# Patient Record
Sex: FEMALE | Race: WHITE | Employment: OTHER | URBAN - METROPOLITAN AREA
[De-identification: names, ages, dates, MRNs, and addresses within clinical notes are randomized per-mention and may not be internally consistent; named-entity substitution may affect disease eponyms.]

---

## 2017-05-13 ENCOUNTER — IP HISTORICAL/CONVERTED ENCOUNTER (OUTPATIENT)
Dept: OTHER | Age: 82
End: 2017-05-13

## 2019-03-06 LAB — CREATININE, EXTERNAL: 0.67

## 2019-03-08 ENCOUNTER — HOSPITAL ENCOUNTER (OUTPATIENT)
Dept: LAB | Age: 84
Discharge: HOME | End: 2019-03-08
Payer: MEDICARE

## 2019-03-08 ENCOUNTER — OFFICE VISIT (OUTPATIENT)
Dept: ENDOCRINOLOGY | Age: 84
End: 2019-03-08

## 2019-03-08 VITALS
TEMPERATURE: 96.2 F | WEIGHT: 130 LBS | RESPIRATION RATE: 16 BRPM | DIASTOLIC BLOOD PRESSURE: 49 MMHG | OXYGEN SATURATION: 98 % | SYSTOLIC BLOOD PRESSURE: 136 MMHG | HEIGHT: 61 IN | BODY MASS INDEX: 24.55 KG/M2 | HEART RATE: 66 BPM

## 2019-03-08 DIAGNOSIS — M81.0 AGE-RELATED OSTEOPOROSIS WITHOUT CURRENT PATHOLOGICAL FRACTURE: Primary | ICD-10-CM

## 2019-03-08 DIAGNOSIS — E55.9 VITAMIN D DEFICIENCY: ICD-10-CM

## 2019-03-08 PROCEDURE — 82306 VITAMIN D 25 HYDROXY: CPT

## 2019-03-08 PROCEDURE — 36415 COLL VENOUS BLD VENIPUNCTURE: CPT

## 2019-03-08 RX ORDER — ESOMEPRAZOLE MAGNESIUM 40 MG/1
1 CAPSULE, DELAYED RELEASE ORAL DAILY
COMMUNITY
Start: 2019-02-18 | End: 2020-10-26

## 2019-03-08 RX ORDER — ACETAMINOPHEN 500 MG
500 TABLET ORAL
COMMUNITY
End: 2020-10-26

## 2019-03-08 RX ORDER — LOSARTAN POTASSIUM AND HYDROCHLOROTHIAZIDE 12.5; 5 MG/1; MG/1
1 TABLET ORAL DAILY
COMMUNITY
Start: 2019-02-20 | End: 2020-10-26

## 2019-03-08 RX ORDER — CLOTRIMAZOLE AND BETAMETHASONE DIPROPIONATE 10; .64 MG/G; MG/G
CREAM TOPICAL 2 TIMES DAILY
COMMUNITY
End: 2020-10-26

## 2019-03-08 RX ORDER — DOCUSATE SODIUM 100 MG/1
100 CAPSULE, LIQUID FILLED ORAL AS NEEDED
COMMUNITY
End: 2020-10-26

## 2019-03-08 RX ORDER — ESTRADIOL 0.1 MG/G
CREAM VAGINAL
COMMUNITY
End: 2020-10-26

## 2019-03-08 RX ORDER — METFORMIN HYDROCHLORIDE 500 MG/1
1 TABLET ORAL 2 TIMES DAILY
COMMUNITY
Start: 2019-03-04 | End: 2020-10-26

## 2019-03-08 RX ORDER — PREGABALIN 100 MG/1
1 CAPSULE ORAL 2 TIMES DAILY
COMMUNITY
Start: 2019-02-19 | End: 2020-10-26

## 2019-03-08 RX ORDER — AMLODIPINE BESYLATE 10 MG/1
1 TABLET ORAL DAILY
COMMUNITY
Start: 2019-02-19 | End: 2020-10-26

## 2019-03-08 NOTE — PROGRESS NOTES
Camden Renee is a 80 y.o. female here for   Chief Complaint   Patient presents with    New Patient     referred by Dr. Ahsan Parikh for Osteoporosis       1. Have you been to the ER, urgent care clinic since your last visit? Hospitalized since your last visit? -n/a    2. Have you seen or consulted any other health care providers outside of the [address] [place] [address] since your last visit?   Include any pap smears or colon screening.-n/a

## 2019-03-08 NOTE — PROGRESS NOTES
[facility]               Yessenia Dickens MD        [address] [address] SK:[phone] Fax [phone]           Patient Information  Date:3/9/2019  Name : Tommy Barber eileen SCHRADER O.B : 9/20/1923         Referred by: Jeronimo Siu MD       Chief Complaint   Patient presents with   [facility] New Patient     referred by Dr. Serjio Renner for Osteoporosis       History of Present Illness: Abrahan Jeffrey is a 80 y.o. female here for evaluation and  management of osteoporosis. She was diagnosed with osteoporosis 10 years ago, was on Prolia for 4 years, last Prolia was in June 2018, she was getting it from [facility]. Tolerated Prolia well. She has underlying GERD, no peptic ulcer disease. Left hip fracture after a fall in 2017  She is on calcium and vitamin D supplements  No severe backache    Prior therapy :Prolia 6/2018    Dietary calcium Intake: Moderate amount of dairy in her diet. On calcium and vitamin D supplements. No history of excess alcohol or tobacco abuse. No known history of hypercalcemia,  Kidney stones, family history of kidney stones. Past Medical History:   Diagnosis Date    Hypertension     Osteoporosis     Type 2 diabetes mellitus ([address]      Past Surgical History:   Procedure Laterality Date    HX HIP REPLACEMENT      HX HYSTERECTOMY      HX KNEE ARTHROSCOPY       Current Outpatient Medications   Medication Sig    amLODIPine (NORVASC) 10 mg tablet Take 1 Tab by mouth daily.  esomeprazole (NEXIUM) 40 mg capsule Take 1 Cap by mouth daily.  losartan-hydroCHLOROthiazide (HYZAAR) 50-12.5 mg per tablet Take 1 Tab by mouth daily.  LYRICA 100 mg capsule Take 1 Tab by mouth two (2) times a day.  metFORMIN (GLUCOPHAGE) 500 mg tablet Take 1 Tab by mouth two (2) times a day.     estradiol (ESTRACE) 0.01 % (0.1 mg/gram) vaginal cream Twice a week    clotrimazole-betamethasone (LOTRISONE) topical cream two (2) times a day.    acetaminophen (TYLENOL EXTRA STRENGTH) 500 mg tablet Take 500 mg by mouth every six (6) hours as needed for Pain.  calcium carbonate-vitamin D3 (CALTRATE 600 + D) 600 mg (1,500 mg)-800 unit chew Take 1 Tab by mouth daily.  docusate sodium (COLACE) 100 mg capsule Take 100 mg by mouth as needed for Constipation.  psyllium seed, with dextrose, (FIBER PO) Take 1 Tab by mouth two (2) times a day.  sodium chloride (AYR SALINE) 0.65 % nasal squeeze bottle 1 [place] by Both Nostrils route as needed for Congestion.  medical supply, miscellaneous (OCUSOFT LID SCRUB) by Does Not Apply route daily. No current facility-administered medications for this visit. No Known Allergies      Review of Systems:  All 10 systems  reviewed and are negative other than mentioned in HPI    Physical Examination:  Blood pressure 136/49, pulse 66, temperature 96.2 °F (35.7 °C), temperature source Oral, resp. rate 16, height 5' 0.5\" (1.537 m), weight 130 lb (59 kg), SpO2 98 %. Body mass index is 24.97 kg/m². - General: pleasant, no distress, good eye contact  - HEENT: no exopthalmos, no periorbital edema, no scleral/conjunctival injection, EOMI, no lid lag or stare  - Neck: supple, no thyromegaly, no supraclavicular or dorsocervical fat pads  - Cardiovascular: regular, normal rate, normal S1 and S2,   - Respiratory: clear to auscultation bilaterally  - Gastrointestinal: soft, nontender, nondistended, BS +  - Musculoskeletal: no proximal muscle weakness in upper or lower extremities  - Integumentary:  no rashes, no edema  - Neurological: reflexes 2+ at biceps, no tremor  - Psychiatric: normal mood and affect    Data Reviewed:         Assessment/Plan:     1. Age-related osteoporosis without current pathological fracture    2. Vitamin D deficiency        Osteoporosis: Age-related    She has normal renal parameters, check vitamin D.   She is on calcium and vitamin D, euthyroid    She wishes to resume Prolia, also discussed about IV zoledronic acid infusion. Thank you for allowing me to participate in the care of this patient. Josie Overton MD    Patient Bryanna Rodriguez verbalized understanding      There are no Patient Instructions on file for this visit. Follow-up Disposition:  Return in about 6 months (around 9/8/2019) for prolia and follow up. Voice-recognition software was used to generate this report, which may result in some phonetic-based errors in the grammar and contents. Even though attempts were made to correct all the mistakes, some may have been missed and remained in the body of the report.

## 2019-03-09 LAB — 25(OH)D3+25(OH)D2 SERPL-MCNC: 52.1 NG/ML (ref 30–100)

## 2019-03-13 ENCOUNTER — TELEPHONE (OUTPATIENT)
Dept: ENDOCRINOLOGY | Age: 84
End: 2019-03-13

## 2019-03-13 NOTE — TELEPHONE ENCOUNTER
----- Message from Regla Mcmahon MD sent at 3/9/2019 10:55 PM EST -----  If she can have Prolia, ask her to come in for the injection

## 2019-03-14 NOTE — TELEPHONE ENCOUNTER
Per Dr. Huseyin Dennis, informed pt of result note, as noted above. Pt verbalized understanding with no further questions or concerns at this time. Transferred pt to  to schedule.

## 2019-03-19 PROBLEM — M77.40 METATARSALGIA: Status: ACTIVE | Noted: 2018-10-02

## 2019-03-19 PROBLEM — L03.119 CELLULITIS OF FOOT: Status: ACTIVE | Noted: 2018-03-27

## 2019-03-19 PROBLEM — S32.9XXA FRACTURE OF PELVIS (HCC): Status: ACTIVE | Noted: 2017-06-20

## 2019-03-19 PROBLEM — L85.1 ACQUIRED KERATODERMA: Status: ACTIVE | Noted: 2019-02-18

## 2019-03-19 PROBLEM — E11.9 DIABETES MELLITUS (HCC): Status: ACTIVE | Noted: 2019-02-18

## 2019-03-19 PROBLEM — M81.0 OSTEOPOROSIS: Status: ACTIVE | Noted: 2019-03-19

## 2019-03-20 ENCOUNTER — CLINICAL SUPPORT (OUTPATIENT)
Dept: ENDOCRINOLOGY | Age: 84
End: 2019-03-20

## 2019-03-20 VITALS
BODY MASS INDEX: 24.73 KG/M2 | HEIGHT: 61 IN | SYSTOLIC BLOOD PRESSURE: 148 MMHG | OXYGEN SATURATION: 95 % | TEMPERATURE: 97.8 F | WEIGHT: 131 LBS | HEART RATE: 67 BPM | RESPIRATION RATE: 14 BRPM | DIASTOLIC BLOOD PRESSURE: 43 MMHG

## 2019-03-20 DIAGNOSIS — M81.0 AGE RELATED OSTEOPOROSIS, UNSPECIFIED PATHOLOGICAL FRACTURE PRESENCE: Primary | ICD-10-CM

## 2019-03-20 NOTE — PROGRESS NOTES
Order placed for pt per verbal order with read back from Dr. Gretta Felipe 03/20/19    Prolia inj given in Left/Right arm subcutaneous by nurse ELAINA Nunn LPN. Pt tolerated well.   Lot #: [id number]  Exp Date: 03/21  Tali Webb 47: [id number]  AMGEN

## 2019-09-18 ENCOUNTER — HOSPITAL ENCOUNTER (OUTPATIENT)
Dept: LAB | Age: 84
Discharge: HOME | End: 2019-09-18
Payer: MEDICARE

## 2019-09-18 DIAGNOSIS — M81.0 AGE RELATED OSTEOPOROSIS, UNSPECIFIED PATHOLOGICAL FRACTURE PRESENCE: ICD-10-CM

## 2019-09-18 PROCEDURE — 82306 VITAMIN D 25 HYDROXY: CPT

## 2019-09-18 PROCEDURE — 80048 BASIC METABOLIC PNL TOTAL CA: CPT

## 2019-09-18 PROCEDURE — 36415 COLL VENOUS BLD VENIPUNCTURE: CPT

## 2019-09-19 LAB
25(OH)D3+25(OH)D2 SERPL-MCNC: 55 NG/ML (ref 30–100)
BUN SERPL-MCNC: 23 MG/DL (ref 10–36)
BUN/CREAT SERPL: 31 (ref 12–28)
CALCIUM SERPL-MCNC: 9.8 MG/DL (ref 8.7–10.3)
CHLORIDE SERPL-SCNC: 103 MMOL/L (ref 96–106)
CO2 SERPL-SCNC: 28 MMOL/L (ref 20–29)
CREAT SERPL-MCNC: 0.74 MG/DL (ref 0.57–1)
GLUCOSE SERPL-MCNC: 106 MG/DL (ref 65–99)
POTASSIUM SERPL-SCNC: 4.3 MMOL/L (ref 3.5–5.2)
SODIUM SERPL-SCNC: 145 MMOL/L (ref 134–144)

## 2019-09-25 ENCOUNTER — OFFICE VISIT (OUTPATIENT)
Dept: ENDOCRINOLOGY | Age: 84
End: 2019-09-25

## 2019-09-25 VITALS
DIASTOLIC BLOOD PRESSURE: 52 MMHG | TEMPERATURE: 96.3 F | BODY MASS INDEX: 23.22 KG/M2 | OXYGEN SATURATION: 96 % | HEART RATE: 68 BPM | SYSTOLIC BLOOD PRESSURE: 123 MMHG | RESPIRATION RATE: 14 BRPM | WEIGHT: 123 LBS | HEIGHT: 61 IN

## 2019-09-25 DIAGNOSIS — M81.0 AGE RELATED OSTEOPOROSIS, UNSPECIFIED PATHOLOGICAL FRACTURE PRESENCE: Primary | ICD-10-CM

## 2019-09-25 DIAGNOSIS — E55.9 VITAMIN D DEFICIENCY: ICD-10-CM

## 2019-09-25 RX ORDER — SITAGLIPTIN 25 MG/1
TABLET, FILM COATED ORAL
COMMUNITY
Start: 2019-09-04 | End: 2020-10-26

## 2019-09-25 RX ORDER — ATORVASTATIN CALCIUM 20 MG/1
1 TABLET, FILM COATED ORAL DAILY
COMMUNITY
Start: 2019-08-29 | End: 2020-10-26

## 2019-09-25 NOTE — LETTER
9/25/19 Patient: Newell Sacks YOB: 1923 Date of Visit: 9/25/2019 Heidi Mckinney MD 
[address] [facility] ,[address] Unit [facility] [address] VIA Facsimile: [phone] Dear Heidi Mckinney MD, Thank you for referring Ms. Collette Cleary to [facility] for evaluation. My notes for this consultation are attached. If you have questions, please do not hesitate to call me. I look forward to following your patient along with you. Sincerely, John Barlow MD

## 2019-09-25 NOTE — PROGRESS NOTES
Yosi Ruano is a 80 y.o. female here for   Chief Complaint   Patient presents with    Osteoporosis       1. Have you been to the ER, urgent care clinic since your last visit? Hospitalized since your last visit? -no    2. Have you seen or consulted any other health care providers outside of the [address] [place] [address] since your last visit? Include any pap smears or colon screening. -PCP

## 2019-10-22 ENCOUNTER — TELEPHONE (OUTPATIENT)
Dept: ENDOCRINOLOGY | Age: 84
End: 2019-10-22

## 2019-10-22 DIAGNOSIS — M81.0 AGE RELATED OSTEOPOROSIS, UNSPECIFIED PATHOLOGICAL FRACTURE PRESENCE: Primary | ICD-10-CM

## 2019-10-22 NOTE — TELEPHONE ENCOUNTER
Patient says she has not heard anything from [facility] for Prolia injection, asking if someone could look into it for her and that she really does not want to go there if there is another option

## 2019-10-22 NOTE — TELEPHONE ENCOUNTER
[facility] is where we can get Prolia cheaper for her , if I give it in the office it will be expensive    Please send another Prolia form to infusion center

## 2019-10-22 NOTE — TELEPHONE ENCOUNTER
Informed pt that order will be faxed for Prolia injection and someone will call to schedule. Informed pt that if injection is given in the office it could be very expensive. Pt verbalized understanding.

## 2019-11-06 ENCOUNTER — HOSPITAL ENCOUNTER (OUTPATIENT)
Dept: INFUSION THERAPY | Age: 84
Discharge: HOME | End: 2019-11-06
Payer: MEDICARE

## 2019-11-06 VITALS
HEART RATE: 87 BPM | OXYGEN SATURATION: 97 % | DIASTOLIC BLOOD PRESSURE: 65 MMHG | TEMPERATURE: 97 F | SYSTOLIC BLOOD PRESSURE: 155 MMHG | RESPIRATION RATE: 18 BRPM

## 2019-11-06 LAB
ANION GAP BLD CALC-SCNC: 14 MMOL/L (ref 10–20)
BUN BLD-MCNC: 31 MG/DL (ref 9–20)
CA-I BLD-MCNC: 1.18 MMOL/L (ref 1.12–1.32)
CHLORIDE BLD-SCNC: 99 MMOL/L (ref 98–107)
CO2 BLD-SCNC: 30 MMOL/L (ref 21–32)
CREAT BLD-MCNC: 0.8 MG/DL (ref 0.6–1.3)
GLUCOSE BLD-MCNC: 171 MG/DL (ref 65–100)
HCT VFR BLD CALC: 39 % (ref 35–47)
MAGNESIUM SERPL-MCNC: 1.9 MG/DL (ref 1.6–2.4)
PHOSPHATE SERPL-MCNC: 3.4 MG/DL (ref 2.6–4.7)
POTASSIUM BLD-SCNC: 3.7 MMOL/L (ref 3.5–5.1)
SERVICE CMNT-IMP: ABNORMAL
SODIUM BLD-SCNC: 139 MMOL/L (ref 136–145)

## 2019-11-06 PROCEDURE — 74011250636 HC RX REV CODE- 250/636: Performed by: INTERNAL MEDICINE

## 2019-11-06 PROCEDURE — 83735 ASSAY OF MAGNESIUM: CPT

## 2019-11-06 PROCEDURE — 84100 ASSAY OF PHOSPHORUS: CPT

## 2019-11-06 PROCEDURE — 96372 THER/PROPH/DIAG INJ SC/IM: CPT

## 2019-11-06 PROCEDURE — 36415 COLL VENOUS BLD VENIPUNCTURE: CPT

## 2019-11-06 PROCEDURE — 80047 BASIC METABLC PNL IONIZED CA: CPT

## 2019-11-06 RX ADMIN — DENOSUMAB 60 MG: 60 INJECTION SUBCUTANEOUS at 15:47

## 2019-11-06 NOTE — PROGRESS NOTES
[facility] VISIT NOTE    1525. Pt arrived at [facility] ambulatory and in no distress for Prolia. Assessment completed, pt c/o chronic hip and back pain. Patient denied recent dental work. Labs drawn peripherally. Istat performed as ordered and iCa within parameters to treat. Medications received:  Prolia - SQ - Left Arm    Tolerated treatment well, no adverse reaction noted. Patient Vitals for the past 12 hrs:   Temp Pulse Resp BP SpO2   11/06/19 1527 97 °F (36.1 °C) 87 18 155/65 97 %     Recent Results (from the past 12 hour(s))   POC CHEM8    Collection Time: 11/06/19  3:42 PM   Result Value Ref Range    Calcium, ionized (POC) 1.18 1.12 - 1.32 mmol/L    Sodium (POC) 139 136 - 145 mmol/L    Potassium (POC) 3.7 3.5 - 5.1 mmol/L    Chloride (POC) 99 98 - 107 mmol/L    CO2 (POC) 30 21 - 32 mmol/L    Anion gap (POC) 14 10 - 20 mmol/L    Glucose (POC) 171 (H) 65 - 100 mg/dL    BUN (POC) 31 (H) 9 - 20 mg/dL    Creatinine (POC) 0.8 0.6 - 1.3 mg/dL    GFRAA, POC >60 >60 ml/min/1.73m2    GFRNA, POC >60 >60 ml/min/1.73m2    Hematocrit (POC) 39 35.0 - 47.0 %    Comment Comment Not Indicated. 1550. D/C'd from [facility] ambulatory and in no distress accompanied by caregiver.  Next appointment is 5/7/20 at 3:00 pm.

## 2020-03-31 ENCOUNTER — TELEPHONE (OUTPATIENT)
Dept: ENDOCRINOLOGY | Age: 85
End: 2020-03-31

## 2020-03-31 DIAGNOSIS — E55.9 VITAMIN D DEFICIENCY: ICD-10-CM

## 2020-03-31 DIAGNOSIS — M81.0 AGE RELATED OSTEOPOROSIS, UNSPECIFIED PATHOLOGICAL FRACTURE PRESENCE: Primary | ICD-10-CM

## 2020-04-07 ENCOUNTER — HOSPITAL ENCOUNTER (OUTPATIENT)
Dept: LAB | Age: 85
Discharge: HOME | End: 2020-04-07

## 2020-04-07 DIAGNOSIS — M81.0 AGE RELATED OSTEOPOROSIS, UNSPECIFIED PATHOLOGICAL FRACTURE PRESENCE: ICD-10-CM

## 2020-04-07 DIAGNOSIS — E55.9 VITAMIN D DEFICIENCY: ICD-10-CM

## 2020-04-07 LAB
ANION GAP SERPL CALC-SCNC: 3 MMOL/L (ref 5–15)
BUN SERPL-MCNC: 24 MG/DL (ref 6–20)
BUN/CREAT SERPL: 35 (ref 12–20)
CALCIUM SERPL-MCNC: 9.6 MG/DL (ref 8.5–10.1)
CHLORIDE SERPL-SCNC: 102 MMOL/L (ref 97–108)
CO2 SERPL-SCNC: 35 MMOL/L (ref 21–32)
CREAT SERPL-MCNC: 0.69 MG/DL (ref 0.55–1.02)
GLUCOSE SERPL-MCNC: 169 MG/DL (ref 65–100)
POTASSIUM SERPL-SCNC: 4 MMOL/L (ref 3.5–5.1)
SODIUM SERPL-SCNC: 140 MMOL/L (ref 136–145)

## 2020-04-08 LAB — 25(OH)D3 SERPL-MCNC: 38 NG/ML (ref 30–100)

## 2020-04-15 ENCOUNTER — VIRTUAL VISIT (OUTPATIENT)
Dept: ENDOCRINOLOGY | Age: 85
End: 2020-04-15

## 2020-04-15 DIAGNOSIS — M81.0 AGE RELATED OSTEOPOROSIS, UNSPECIFIED PATHOLOGICAL FRACTURE PRESENCE: Primary | ICD-10-CM

## 2020-04-15 DIAGNOSIS — E55.9 VITAMIN D DEFICIENCY: ICD-10-CM

## 2020-04-15 RX ORDER — VALSARTAN AND HYDROCHLOROTHIAZIDE 160; 12.5 MG/1; MG/1
1 TABLET, FILM COATED ORAL DAILY
COMMUNITY
End: 2020-10-26

## 2020-04-15 NOTE — PROGRESS NOTES
Gilles Rabago is a 80 y.o. female here for   Chief Complaint   Patient presents with    Osteoporosis       1. Have you been to the ER, urgent care clinic since your last visit? Hospitalized since your last visit? -no    2. Have you seen or consulted any other health care providers outside of the [address] [place] [address] since your last visit?   Include any pap smears or colon screening.-no      Order placed for pt per verbal order with read back from Dr. Dunia Ahumada 04/15/20

## 2020-04-15 NOTE — PROGRESS NOTES
Abel Dawn MD        Patient Information  Date:4/15/2020  Name : Aly Cleary 80 y.o.     YOB: 1923         Referred by: Bianca Kwong MD       Chief Complaint   Patient presents with    Osteoporosis   Pursuant to the emergency declaration under the [facility] [place] [address] waiver authority and the [employer] Act, this Virtual  Visit was conducted, with patient's consent, to reduce the patient's risk of exposure to COVID-19 . Patient  is aware that this is a billable encounter and is responsible for copays/deductibles       Services were provided through a telephone discussion virtually to substitute for in-person clinic visit. Place of service: Provider : [facility] diabetes endocrinology office  Patient: Home    History of Present Illness: Damien Orellana is a 80 y.o. female here for follow-up of osteoporosis. She was diagnosed with osteoporosis 10 years ago, was on Prolia since 2014, initially got it from [facility]. She has underlying GERD, no peptic ulcer disease. Left hip fracture after a fall in 2017  She is on calcium and vitamin D supplements  No severe backache  No dental issues, no falls  She had the last infusion at [facility]      Left thigh pain, radiates from the back of the hip, history of arthritis, bursitis, steroid injections in the past.  It is not in the right thigh. No fall          Dietary calcium Intake: Moderate amount of dairy in her diet. On calcium and vitamin D supplements. No history of excess alcohol or tobacco abuse. No known history of hypercalcemia,  Kidney stones, family history of kidney stones.       Past Medical History:   Diagnosis Date    Hypertension     Osteoporosis     Type 2 diabetes mellitus ([facility] [address]      Past Surgical History:   Procedure Laterality Date    HX HIP REPLACEMENT      HX HYSTERECTOMY      HX KNEE ARTHROSCOPY       Current Outpatient Medications   Medication Sig    valsartan-hydroCHLOROthiazide (DIOVAN-HCT) 160-12.5 mg per tablet Take 1 Tab by mouth daily.  JANUVIA 25 mg tablet     atorvastatin (LIPITOR) 20 mg tablet Take 1 Tab by mouth daily.  amLODIPine (NORVASC) 10 mg tablet Take 1 Tab by mouth daily.  esomeprazole (NEXIUM) 40 mg capsule Take 1 Cap by mouth daily.  LYRICA 100 mg capsule Take 1 Tab by mouth two (2) times a day.  metFORMIN (GLUCOPHAGE) 500 mg tablet Take 1 Tab by mouth two (2) times a day.  estradiol (ESTRACE) 0.01 % (0.1 mg/gram) vaginal cream Twice a week    clotrimazole-betamethasone (LOTRISONE) topical cream two (2) times a day.  acetaminophen (TYLENOL EXTRA STRENGTH) 500 mg tablet Take 500 mg by mouth every six (6) hours as needed for Pain.  calcium carbonate-vitamin D3 (CALTRATE 600 + D) 600 mg (1,500 mg)-800 unit chew Take 1 Tab by mouth daily.  docusate sodium (COLACE) 100 mg capsule Take 100 mg by mouth as needed for Constipation.  psyllium seed, with dextrose, (FIBER PO) Take 1 Tab by mouth two (2) times a day.  sodium chloride (AYR SALINE) 0.65 % nasal squeeze bottle 1 [place] by Both Nostrils route as needed for Congestion.  medical supply, miscellaneous (OCUSOFT LID SCRUB) by Does Not Apply route daily.  losartan-hydroCHLOROthiazide (HYZAAR) 50-12.5 mg per tablet Take 1 Tab by mouth daily. No current facility-administered medications for this visit. No Known Allergies      Review of Systems:  All 10 systems  reviewed and are negative other than mentioned in HPI    Physical Examination:  There were no vitals taken for this visit. There is no height or weight on file to calculate BMI.  -     Data Reviewed:         Assessment/Plan:     1. Age related osteoporosis, unspecified pathological fracture presence    2.  Vitamin D deficiency        Osteoporosis: Age-related    She has normal renal parameters    Prolia November 2019, due for Prolia, discussed about postponing and using alendronate weekly, she does not want to take the alendronate. She will try to get the Prolia at the infusion center  Not interested in IV Reclast  Fall precautions      Left thigh pain likely radiating pain from the arthritis: It is unilateral  Warm pack  If no improvement and if it gets worse discussed about getting x-ray, need to rule out atypical femoral fracture    Hypertension    Hyperlipidemia: Could discontinue statin,      Spent 15 to 18 minutes on the phone with the patient      Thank you for allowing me to participate in the care of this patient. Sasha Purdy MD    Patient Eun Waters verbalized understanding      There are no Patient Instructions on file for this visit. Follow-up and Dispositions    · Return in about 3 months (around 7/15/2020). Voice-recognition software was used to generate this report, which may result in some phonetic-based errors in the grammar and contents. Even though attempts were made to correct all the mistakes, some may have been missed and remained in the body of the report.

## 2020-05-06 ENCOUNTER — HOSPITAL ENCOUNTER (OUTPATIENT)
Dept: INFUSION THERAPY | Age: 85
Discharge: HOME | End: 2020-05-06
Payer: MEDICARE

## 2020-05-06 VITALS
TEMPERATURE: 96.2 F | DIASTOLIC BLOOD PRESSURE: 65 MMHG | RESPIRATION RATE: 16 BRPM | SYSTOLIC BLOOD PRESSURE: 142 MMHG | HEART RATE: 73 BPM

## 2020-05-06 LAB
MAGNESIUM SERPL-MCNC: 2 MG/DL (ref 1.6–2.4)
PHOSPHATE SERPL-MCNC: 3.5 MG/DL (ref 2.6–4.7)

## 2020-05-06 PROCEDURE — 84100 ASSAY OF PHOSPHORUS: CPT

## 2020-05-06 PROCEDURE — 74011250636 HC RX REV CODE- 250/636: Performed by: INTERNAL MEDICINE

## 2020-05-06 PROCEDURE — 83735 ASSAY OF MAGNESIUM: CPT

## 2020-05-06 PROCEDURE — 96372 THER/PROPH/DIAG INJ SC/IM: CPT

## 2020-05-06 PROCEDURE — 36415 COLL VENOUS BLD VENIPUNCTURE: CPT

## 2020-05-06 RX ADMIN — DENOSUMAB 60 MG: 60 INJECTION SUBCUTANEOUS at 13:28

## 2020-05-06 NOTE — PROGRESS NOTES
[facility] VISIT NOTE    Pt arrived at [facility] ambulatory and in no distress for Prolia. Assessment completed, pt had no complaints. Patient Vitals for the past 12 hrs:   Temp Pulse Resp BP   05/06/20 1308 96.2 °F (35.7 °C) 73 16 142/65     Labs drawn from left ac and sent. Medications Administered     denosumab (PROLIA) injection 60 mg     Admin Date  05/06/2020 Action  Given Dose  60 mg Route  SubCUTAneous Administered By  Ese Blanton RN              Tolerated treatment well, no adverse reaction noted. D/C'd from [facility] ambulatory and in no distress. Next appointment is 11/6/20.

## 2020-05-07 ENCOUNTER — APPOINTMENT (OUTPATIENT)
Dept: INFUSION THERAPY | Age: 85
End: 2020-05-07

## 2020-06-24 ENCOUNTER — OFFICE VISIT (OUTPATIENT)
Dept: ENDOCRINOLOGY | Age: 85
End: 2020-06-24

## 2020-06-24 VITALS
HEIGHT: 61 IN | SYSTOLIC BLOOD PRESSURE: 140 MMHG | TEMPERATURE: 96.9 F | RESPIRATION RATE: 18 BRPM | HEART RATE: 71 BPM | DIASTOLIC BLOOD PRESSURE: 47 MMHG | BODY MASS INDEX: 22.66 KG/M2 | WEIGHT: 120 LBS | OXYGEN SATURATION: 98 %

## 2020-06-24 DIAGNOSIS — E55.9 VITAMIN D DEFICIENCY: ICD-10-CM

## 2020-06-24 DIAGNOSIS — M81.0 AGE RELATED OSTEOPOROSIS, UNSPECIFIED PATHOLOGICAL FRACTURE PRESENCE: Primary | ICD-10-CM

## 2020-06-24 DIAGNOSIS — M79.652 PAIN IN LEFT THIGH: ICD-10-CM

## 2020-06-24 NOTE — PROGRESS NOTES
Evelyne Patten is a 80 y.o. female here for   Chief Complaint   Patient presents with    Osteoporosis       1. Have you been to the ER, urgent care clinic since your last visit? Hospitalized since your last visit? -no    2. Have you seen or consulted any other health care providers outside of the [address] [place] [address] since your last visit? Include any pap smears or colon screening. -PCP and Dr. Marielle Pierson

## 2020-06-24 NOTE — PROGRESS NOTES
Paulina Decker MD        Patient Information  Date:6/24/2020  Name : Luisa Cleary 80 y.o.     YOB: 1923         Referred by: Wiley Caldwell MD       Chief Complaint   Patient presents with    Osteoporosis       History of Present Illness: Miguel Loo is a 80 y.o. female here for follow-up of osteoporosis. She was diagnosed with osteoporosis 10 years ago, was on Prolia since 2014, initially got it from [facility]. She has underlying GERD, no peptic ulcer disease. Left hip fracture after a fall in 2017  She is on calcium and vitamin D supplements  No severe backache  No dental issues, no falls  She had the last infusion at [facility] left thigh pain    Left thigh pain, radiates from the back of the hip, history of arthritis, bursitis, steroid injections in the past.  Seen Orthopedician recently, the pain is better now            Dietary calcium Intake: Moderate amount of dairy in her diet. On calcium and vitamin D supplements. No history of excess alcohol or tobacco abuse. No known history of hypercalcemia,  Kidney stones, family history of kidney stones. Past Medical History:   Diagnosis Date    Hypertension     Osteoporosis     Type 2 diabetes mellitus ([facility] [address]      Past Surgical History:   Procedure Laterality Date    HX HIP REPLACEMENT      HX HYSTERECTOMY      HX KNEE ARTHROSCOPY       Current Outpatient Medications   Medication Sig    valsartan-hydroCHLOROthiazide (DIOVAN-HCT) 160-12.5 mg per tablet Take 1 Tab by mouth daily.  JANUVIA 25 mg tablet     atorvastatin (LIPITOR) 20 mg tablet Take 1 Tab by mouth daily.  amLODIPine (NORVASC) 10 mg tablet Take 1 Tab by mouth daily.  esomeprazole (NEXIUM) 40 mg capsule Take 1 Cap by mouth daily.  LYRICA 100 mg capsule Take 1 Tab by mouth two (2) times a day.     metFORMIN (GLUCOPHAGE) 500 mg tablet Take 1 Tab by mouth two (2) times a day.  estradiol (ESTRACE) 0.01 % (0.1 mg/gram) vaginal cream Twice a week    clotrimazole-betamethasone (LOTRISONE) topical cream two (2) times a day.  acetaminophen (TYLENOL EXTRA STRENGTH) 500 mg tablet Take 500 mg by mouth every six (6) hours as needed for Pain.  calcium carbonate-vitamin D3 (CALTRATE 600 + D) 600 mg (1,500 mg)-800 unit chew Take 1 Tab by mouth daily.  docusate sodium (COLACE) 100 mg capsule Take 100 mg by mouth as needed for Constipation.  psyllium seed, with dextrose, (FIBER PO) Take 1 Tab by mouth two (2) times a day.  sodium chloride (AYR SALINE) 0.65 % nasal squeeze bottle 1 [place] by Both Nostrils route as needed for Congestion.  medical supply, miscellaneous (OCUSOFT LID SCRUB) by Does Not Apply route daily.  losartan-hydroCHLOROthiazide (HYZAAR) 50-12.5 mg per tablet Take 1 Tab by mouth daily. No current facility-administered medications for this visit. No Known Allergies      Review of Systems:  All 10 systems  reviewed and are negative other than mentioned in HPI    Physical Examination:  Blood pressure 140/47, pulse 71, temperature 96.9 °F (36.1 °C), temperature source Oral, resp. rate 18, height 5' 0.5\" (1.537 m), weight 120 lb (54.4 kg), SpO2 98 %. Body mass index is 23.05 kg/m². - General: pleasant, no distress, good eye contact  - HEENT: no exophthalmos, no periorbital edema, EOMI  - Neck: No visible thyromegaly  - RS: Normal respiratory effort  - Musculoskeletal: no tremors  - Neurological: alert and oriented  - Psychiatric: normal mood and affect  - Skin: Normal color    Data Reviewed:         Assessment/Plan:     1.  Age related osteoporosis, unspecified pathological fracture presence        Age-related osteoporosis    She has normal renal parameters  Prolia November 2019, May 2019  Not interested in IV Reclast  Fall precautions      Left thigh pain likely radiating pain from the arthritis: It is unilateral  Order for x-ray left femur given to the patient if she notices worsening of the pain, need to rule out atypical femoral fracture    Hypertension: Controlled    Hyperlipidemia: Could discontinue statin,            Thank you for allowing me to participate in the care of this patient. Bobby Dia MD    Patient Celestina Francisco verbalized understanding      There are no Patient Instructions on file for this visit. Voice-recognition software was used to generate this report, which may result in some phonetic-based errors in the grammar and contents. Even though attempts were made to correct all the mistakes, some may have been missed and remained in the body of the report.

## 2020-06-24 NOTE — LETTER
6/24/20 Patient: Miguel Loo YOB: 1923 Date of Visit: 6/24/2020 Hari Richards MD 
[address] [place] [address] VIA Facsimile: [phone] Dear Hari Richards MD, Thank you for referring Ms. Collette Cleary to [facility]OLOG[facility] for evaluation. My notes for this consultation are attached. If you have questions, please do not hesitate to call me. I look forward to following your patient along with you. Sincerely, Astrid Feng MD

## 2020-07-31 ENCOUNTER — OFFICE VISIT (OUTPATIENT)
Dept: ENT CLINIC | Age: 85
End: 2020-07-31
Payer: MEDICARE

## 2020-07-31 VITALS
TEMPERATURE: 98 F | BODY MASS INDEX: 20.46 KG/M2 | HEIGHT: 65 IN | OXYGEN SATURATION: 98 % | HEART RATE: 67 BPM | DIASTOLIC BLOOD PRESSURE: 58 MMHG | SYSTOLIC BLOOD PRESSURE: 144 MMHG | WEIGHT: 122.8 LBS

## 2020-07-31 DIAGNOSIS — J34.2 DEVIATED NASAL SEPTUM: ICD-10-CM

## 2020-07-31 DIAGNOSIS — J34.89 OBSTRUCTION OF NASAL VALVE: ICD-10-CM

## 2020-07-31 DIAGNOSIS — R04.0 EPISTAXIS: ICD-10-CM

## 2020-07-31 DIAGNOSIS — J31.0 CHRONIC RHINITIS: Primary | ICD-10-CM

## 2020-07-31 PROCEDURE — 99214 OFFICE O/P EST MOD 30 MIN: CPT | Performed by: OTOLARYNGOLOGY

## 2020-07-31 RX ORDER — DOCUSATE SODIUM 100 MG/1
100 CAPSULE, LIQUID FILLED ORAL AS NEEDED
COMMUNITY
End: 2022-06-15

## 2020-07-31 RX ORDER — METFORMIN HYDROCHLORIDE 500 MG/1
500 TABLET ORAL 2 TIMES DAILY WITH MEALS
COMMUNITY

## 2020-07-31 RX ORDER — ESOMEPRAZOLE MAGNESIUM 40 MG/1
CAPSULE, DELAYED RELEASE ORAL DAILY
COMMUNITY
End: 2022-06-15

## 2020-07-31 RX ORDER — ATORVASTATIN CALCIUM 20 MG/1
20 TABLET, FILM COATED ORAL DAILY
COMMUNITY
End: 2022-06-15

## 2020-07-31 RX ORDER — VALSARTAN AND HYDROCHLOROTHIAZIDE 160; 12.5 MG/1; MG/1
1 TABLET, FILM COATED ORAL DAILY
COMMUNITY
End: 2022-05-04

## 2020-07-31 RX ORDER — ASPIRIN 325 MG
325 TABLET ORAL DAILY
COMMUNITY

## 2020-07-31 RX ORDER — SODIUM CHLORIDE/ALOE VERA
GEL (GRAM) NASAL ONCE
COMMUNITY
End: 2022-06-15

## 2020-07-31 RX ORDER — ESTRADIOL 0.1 MG/G
2 CREAM VAGINAL
COMMUNITY

## 2020-07-31 RX ORDER — AMLODIPINE BESYLATE 10 MG/1
10 TABLET ORAL DAILY
COMMUNITY

## 2020-07-31 RX ORDER — PREGABALIN 50 MG/1
50 CAPSULE ORAL 2 TIMES DAILY
COMMUNITY

## 2020-07-31 RX ORDER — MULTIVITAMIN
1 TABLET ORAL DAILY
COMMUNITY

## 2020-07-31 NOTE — PROGRESS NOTES
Subjective:    Patrizia Patches Short   80 y.o.   9/20/1923     Recent Mohs surgery with nasal flap causing right nasal obstruction. No nasal bleeding. H/O  Nasal crust in left nostril. Patient uses Ayrgel daily. Review of Systems  Review of Systems   Constitutional: Negative. HENT: Positive for congestion. Eyes: Negative. Respiratory: Negative. Cardiovascular: Negative. Gastrointestinal: Negative. Musculoskeletal: Negative. Skin: Negative. Neurological: Negative. Endo/Heme/Allergies: Negative. Psychiatric/Behavioral: Negative. Objective:     Visit Vitals  /58 (BP 1 Location: Left arm, BP Patient Position: Sitting)   Pulse 67   Temp 98 °F (36.7 °C)   Ht 5' 5\" (1.651 m)   Wt 122 lb 12.8 oz (55.7 kg)   SpO2 98%   BMI 20.43 kg/m²      Physical Exam  Constitutional:       General: She is awake. Appearance: Normal appearance. She is well-developed. HENT:      Head: Normocephalic and atraumatic. Right Ear: Hearing, tympanic membrane, ear canal and external ear normal.      Left Ear: Hearing, tympanic membrane, ear canal and external ear normal.      Nose: Septal deviation (right anterior nasal septal deflection) present. Comments: Right nasal ala Simone test indicated improved nasal airflow. Mouth/Throat:      Lips: Pink. Mouth: Mucous membranes are moist.   Neurological:      General: No focal deficit present. Mental Status: She is alert and oriented to person, place, and time. Mental status is at baseline. Psychiatric:         Attention and Perception: Attention normal.         Mood and Affect: Mood and affect normal.         Speech: Speech normal.         Behavior: Behavior is cooperative. Assessment/Plan:     Encounter Diagnoses   Name Primary?  Chronic rhinitis Yes    Deviated nasal septum     Epistaxis     Obstruction of nasal valve    Recommend the patient continue AYR nasal gel.   Recommend patient consider Breathe Right strip to help improve breathing due to nasal valve collapse. Return to the office if the patient develops epistaxis. Follow-up and Dispositions    · Return if symptoms worsen or fail to improve, for Call if nasal bleeding recurs. Juan Pablo Chavez

## 2020-10-26 ENCOUNTER — OFFICE VISIT (OUTPATIENT)
Dept: ENDOCRINOLOGY | Age: 85
End: 2020-10-26
Payer: MEDICARE

## 2020-10-26 VITALS
OXYGEN SATURATION: 96 % | WEIGHT: 121 LBS | SYSTOLIC BLOOD PRESSURE: 141 MMHG | HEART RATE: 67 BPM | DIASTOLIC BLOOD PRESSURE: 49 MMHG | BODY MASS INDEX: 22.84 KG/M2 | TEMPERATURE: 95.9 F | HEIGHT: 61 IN | RESPIRATION RATE: 18 BRPM

## 2020-10-26 DIAGNOSIS — E55.9 VITAMIN D DEFICIENCY: ICD-10-CM

## 2020-10-26 DIAGNOSIS — M81.0 AGE RELATED OSTEOPOROSIS, UNSPECIFIED PATHOLOGICAL FRACTURE PRESENCE: Primary | ICD-10-CM

## 2020-10-26 DIAGNOSIS — I10 ESSENTIAL HYPERTENSION: ICD-10-CM

## 2020-10-26 LAB
ANION GAP SERPL CALC-SCNC: 8 MMOL/L (ref 5–15)
BUN SERPL-MCNC: 28 MG/DL (ref 6–20)
BUN/CREAT SERPL: 37 (ref 12–20)
CALCIUM SERPL-MCNC: 9.9 MG/DL (ref 8.5–10.1)
CHLORIDE SERPL-SCNC: 104 MMOL/L (ref 97–108)
CO2 SERPL-SCNC: 30 MMOL/L (ref 21–32)
CREAT SERPL-MCNC: 0.76 MG/DL (ref 0.55–1.02)
GLUCOSE SERPL-MCNC: 109 MG/DL (ref 65–100)
POTASSIUM SERPL-SCNC: 4 MMOL/L (ref 3.5–5.1)
SODIUM SERPL-SCNC: 142 MMOL/L (ref 136–145)

## 2020-10-26 PROCEDURE — G8536 NO DOC ELDER MAL SCRN: HCPCS | Performed by: INTERNAL MEDICINE

## 2020-10-26 PROCEDURE — 99214 OFFICE O/P EST MOD 30 MIN: CPT | Performed by: INTERNAL MEDICINE

## 2020-10-26 PROCEDURE — 1101F PT FALLS ASSESS-DOCD LE1/YR: CPT | Performed by: INTERNAL MEDICINE

## 2020-10-26 PROCEDURE — 1090F PRES/ABSN URINE INCON ASSESS: CPT | Performed by: INTERNAL MEDICINE

## 2020-10-26 PROCEDURE — G8432 DEP SCR NOT DOC, RNG: HCPCS | Performed by: INTERNAL MEDICINE

## 2020-10-26 PROCEDURE — G8427 DOCREV CUR MEDS BY ELIG CLIN: HCPCS | Performed by: INTERNAL MEDICINE

## 2020-10-26 PROCEDURE — G8420 CALC BMI NORM PARAMETERS: HCPCS | Performed by: INTERNAL MEDICINE

## 2020-10-26 RX ORDER — KETOCONAZOLE 20 MG/G
CREAM TOPICAL DAILY
COMMUNITY
End: 2022-06-15

## 2020-10-26 RX ORDER — ACETAMINOPHEN 500 MG
500 TABLET ORAL 3 TIMES DAILY
COMMUNITY

## 2020-10-26 RX ORDER — LOSARTAN POTASSIUM AND HYDROCHLOROTHIAZIDE 12.5; 5 MG/1; MG/1
1 TABLET ORAL DAILY
COMMUNITY
End: 2022-05-04

## 2020-10-26 RX ORDER — DICLOFENAC SODIUM 10 MG/G
GEL TOPICAL AS NEEDED
COMMUNITY

## 2020-10-26 NOTE — PROGRESS NOTES
Gaina De La Garza is a 80 y.o. female here for   Chief Complaint   Patient presents with    Osteoporosis       1. Have you been to the ER, urgent care clinic since your last visit? Hospitalized since your last visit? -no    2. Have you seen or consulted any other health care providers outside of the [address] [place] [address] since your last visit? Include any pap smears or colon screening. -PCP

## 2020-10-26 NOTE — LETTER
10/27/20 Patient: Doreen Hurt YOB: 1923 Date of Visit: 10/26/2020 Ina Ridley MD 
[address] [place] [address] VIA In Basket Dear Ina Ridley MD, Thank you for referring Ms. Collette Cleary to [facility]OLOG[facility] for evaluation. My notes for this consultation are attached. If you have questions, please do not hesitate to call me. I look forward to following your patient along with you. Sincerely, Jignesh Ariza MD

## 2020-10-26 NOTE — PROGRESS NOTES
Mendel Menjivar MD        Patient Information  Date:10/27/2020  Name : Kathe Cleary 80 y.o.     YOB: 1923         Referred by: Arelis Joyner MD       Chief Complaint   Patient presents with    Osteoporosis       History of Present Illness: Sara Tyler is a 80 y.o. female here for follow-up of osteoporosis. She was diagnosed with osteoporosis 10 years ago, was on Prolia since 2014, initially got it from [address] [facility]. She has underlying GERD, no peptic ulcer disease. Left hip fracture after a fall in 2017  She is on calcium and vitamin D supplements  No severe backache  No dental issues, no falls  She had the last infusion at [facility]         Dietary calcium Intake: Moderate amount of dairy in her diet. On calcium and vitamin D supplements. No history of excess alcohol or tobacco abuse. No known history of hypercalcemia,  Kidney stones, family history of kidney stones. Past Medical History:   Diagnosis Date    DM (diabetes mellitus) ([address]     High cholesterol     History of multiple allergies     HTN (hypertension)     Hypertension     Osteoporosis     Type 2 diabetes mellitus (HCC)      Past Surgical History:   Procedure Laterality Date    HX APPENDECTOMY      HX HIP ARTHROSCOPY      HX HIP REPLACEMENT      HX HYSTERECTOMY      HX KNEE ARTHROSCOPY       Current Outpatient Medications   Medication Sig    ketoconazole (NIZORAL) 2 % topical cream Apply  to affected area daily.  diclofenac (VOLTAREN) 1 % gel Apply  to affected area as needed for Pain.  losartan-hydroCHLOROthiazide (HYZAAR) 50-12.5 mg per tablet Take 1 Tab by mouth daily.  acetaminophen (Tylenol Extra Strength) 500 mg tablet Take 500 mg by mouth five (5) times daily.  amLODIPine (NORVASC) 10 mg tablet Take  by mouth daily.  aspirin delayed-release 81 mg tablet Take  by mouth daily.     atorvastatin (LIPITOR) 20 mg tablet Take  by mouth daily.  sodium chloride-aloe vera (Ayr Saline) topical gel Apply  to affected area once.  calcium-cholecalciferol, D3, (CALTRATE 600+D) tablet Take 1 Tab by mouth daily.  docusate sodium (Colace) 100 mg capsule Take 100 mg by mouth as needed.  esomeprazole (NEXIUM) 40 mg capsule Take  by mouth daily.  SITagliptin (Januvia) 25 mg tablet Take 25 mg by mouth daily.  pregabalin (LYRICA) 100 mg capsule Take  by mouth two (2) times a day.  metFORMIN (GLUCOPHAGE) 500 mg tablet Take  by mouth two (2) times daily (with meals).  estradioL (ESTRACE) 0.01 % (0.1 mg/gram) vaginal cream Insert 2 g into vagina daily.  valsartan-hydroCHLOROthiazide (DIOVAN-HCT) 160-12.5 mg per tablet Take 1 Tab by mouth daily. No current facility-administered medications for this visit. No Known Allergies      Review of Systems:  All 10 systems  reviewed and are negative other than mentioned in HPI    Physical Examination:  Blood pressure (!) 141/49, pulse 67, temperature (!) 95.9 °F (35.5 °C), temperature source Oral, resp. rate 18, height 5' 0.5\" (1.537 m), weight 121 lb (54.9 kg), SpO2 96 %. Body mass index is 23.24 kg/m². - General: pleasant, no distress, good eye contact  - HEENT: no exophthalmos, no periorbital edema, EOMI  - Neck: No visible thyromegaly  - RS: Normal respiratory effort  - Musculoskeletal: no tremors  - Neurological: alert and oriented  - Psychiatric: normal mood and affect  - Skin: Normal color    Data Reviewed:         Assessment/Plan:     1. Age related osteoporosis, unspecified pathological fracture presence    2. Vitamin D deficiency    3.  Essential hypertension        Age-related osteoporosis  Prolia started in November 2019 by me, prior to that she was on Prolia since 2014  Did not want IV Reclast  Labs today    Vitamin D deficiency    Bilateral hip pain: Better, no thigh pain    Hypertension: Controlled    Hyperlipidemia: Could discontinue statin,            Thank you for allowing me to participate in the care of this patient. Dee Baker MD    Patient Mao Oliveros verbalized understanding      There are no Patient Instructions on file for this visit. Follow-up and Dispositions    · Return in about 6 months (around 4/26/2021) for prolia and follow up. Voice-recognition software was used to generate this report, which may result in some phonetic-based errors in the grammar and contents. Even though attempts were made to correct all the mistakes, some may have been missed and remained in the body of the report.

## 2020-10-27 LAB — 25(OH)D3 SERPL-MCNC: 52.2 NG/ML (ref 30–100)

## 2020-11-06 ENCOUNTER — HOSPITAL ENCOUNTER (OUTPATIENT)
Dept: INFUSION THERAPY | Age: 85
Discharge: HOME | End: 2020-11-06
Payer: MEDICARE

## 2020-11-06 VITALS
SYSTOLIC BLOOD PRESSURE: 128 MMHG | OXYGEN SATURATION: 95 % | HEART RATE: 71 BPM | DIASTOLIC BLOOD PRESSURE: 56 MMHG | TEMPERATURE: 97.4 F | RESPIRATION RATE: 18 BRPM

## 2020-11-06 PROCEDURE — 74011250636 HC RX REV CODE- 250/636: Performed by: INTERNAL MEDICINE

## 2020-11-06 PROCEDURE — 96372 THER/PROPH/DIAG INJ SC/IM: CPT

## 2020-11-06 RX ADMIN — DENOSUMAB 60 MG: 60 INJECTION SUBCUTANEOUS at 11:36

## 2020-11-06 NOTE — PROGRESS NOTES
[facility] Progress Note    Date: 2020    Name: Louie Trejo    MRN: [id number]         : 1923    Ms. Cleary Arrived ambulatory and in no distress for prolia Injection. Assessment was completed, no acute issues at this time, no new complaints voiced. Labs previously drawn and supplied by the patient's physician, note from MD that we do not need to draw a mag and phos on this patient. Ms. Claudetta Caprice vitals were reviewed. Visit Vitals  BP (!) 128/56   Pulse 71   Temp 97.4 °F (36.3 °C)   Resp 18   SpO2 95%   Breastfeeding No     Labs previously drawn 10/26/20 with a Ca 9.9    Medications:  Medications Administered     denosumab (PROLIA) injection 60 mg     Admin Date  2020 Action  Given Dose  60 mg Route  SubCUTAneous Administered By  Kelly Russell RN                  Ms. Jose Porter tolerated treatment well and was discharged from [place] [address] in stable condition.     Emily Espinoza RN  2020

## 2020-11-17 VITALS
WEIGHT: 120 LBS | DIASTOLIC BLOOD PRESSURE: 60 MMHG | SYSTOLIC BLOOD PRESSURE: 112 MMHG | BODY MASS INDEX: 19.99 KG/M2 | HEIGHT: 65 IN

## 2020-11-24 ENCOUNTER — OFFICE VISIT (OUTPATIENT)
Dept: ENT CLINIC | Age: 85
End: 2020-11-24
Payer: MEDICARE

## 2020-11-24 VITALS
TEMPERATURE: 97.3 F | OXYGEN SATURATION: 97 % | SYSTOLIC BLOOD PRESSURE: 118 MMHG | BODY MASS INDEX: 20.16 KG/M2 | DIASTOLIC BLOOD PRESSURE: 54 MMHG | HEART RATE: 65 BPM | HEIGHT: 65 IN | WEIGHT: 121 LBS

## 2020-11-24 DIAGNOSIS — R04.0 EPISTAXIS: ICD-10-CM

## 2020-11-24 DIAGNOSIS — J34.89 OBSTRUCTION OF NASAL VALVE: ICD-10-CM

## 2020-11-24 DIAGNOSIS — H61.21 IMPACTED CERUMEN OF RIGHT EAR: ICD-10-CM

## 2020-11-24 DIAGNOSIS — J34.2 DEVIATED NASAL SEPTUM: Primary | ICD-10-CM

## 2020-11-24 PROCEDURE — 69210 REMOVE IMPACTED EAR WAX UNI: CPT | Performed by: OTOLARYNGOLOGY

## 2020-11-24 NOTE — PROGRESS NOTES
Subjective:    Scheryl Collar Short   80 y.o.   9/20/1923       Location - nose, ear    Quality - left ear blocked    Severity -  moderate    Duration - few weeks    Timing - ongoing, chronic    Context - f/u pt of [facility] - pt does have baseline right nasal obstruction, mostly from a Mohs recon to right nasal ala region in past; c/o intermittent crusting left side which causes obstruction as well; she does c/o left ear feels blocked lately also    Modifying Features - gel/saline helps some but not enough    Associated symptoms/signs - none      Review of Systems  Review of Systems   Constitutional: Negative for chills and fever. HENT: Positive for congestion, hearing loss and nosebleeds. Negative for ear pain and tinnitus. Eyes: Negative for blurred vision and double vision. Respiratory: Negative for cough, sputum production and shortness of breath. Cardiovascular: Negative for chest pain and palpitations. Gastrointestinal: Negative for heartburn, nausea and vomiting. Musculoskeletal: Negative for joint pain and neck pain. Skin: Negative. Neurological: Negative for dizziness, speech change, weakness and headaches. Endo/Heme/Allergies: Negative for environmental allergies. Does not bruise/bleed easily. Psychiatric/Behavioral: Negative for memory loss. The patient does not have insomnia. Past Medical History:   Diagnosis Date    DM [facility]di[facility]) ([facility]     High cholesterol     History of multiple allergies     HTN (hypertension)     Hypertension     Osteoporosis     Type 2 diabetes mellitus ([facility]      Prior to Admission medications    Medication Sig Start Date End Date Taking? Authorizing Provider   ketoconazole (NIZORAL) 2 % topical cream Apply  to affected area daily. Provider, Historical   diclofenac (VOLTAREN) 1 % gel Apply  to affected area as needed for Pain. Provider, Historical   losartan-hydroCHLOROthiazide (HYZAAR) 50-12.5 mg per tablet Take 1 Tab by mouth daily. Provider, Historical   acetaminophen (Tylenol Extra Strength) 500 mg tablet Take 500 mg by mouth five (5) times daily. Provider, Historical   amLODIPine (NORVASC) 10 mg tablet Take  by mouth daily. Provider, Historical   aspirin delayed-release 81 mg tablet Take  by mouth daily. Provider, Historical   atorvastatin (LIPITOR) 20 mg tablet Take  by mouth daily. Provider, Historical   sodium chloride-aloe vera (Ayr Saline) topical gel Apply  to affected area once. Provider, Historical   calcium-cholecalciferol, D3, (CALTRATE 600+D) tablet Take 1 Tab by mouth daily. Provider, Historical   docusate sodium (Colace) 100 mg capsule Take 100 mg by mouth as needed. Provider, Historical   esomeprazole (NEXIUM) 40 mg capsule Take  by mouth daily. Provider, Historical   SITagliptin (Januvia) 25 mg tablet Take 25 mg by mouth daily. Provider, Historical   valsartan-hydroCHLOROthiazide (DIOVAN-HCT) 160-12.5 mg per tablet Take 1 Tab by mouth daily. Provider, Historical   pregabalin (LYRICA) 100 mg capsule Take  by mouth two (2) times a day. Provider, Historical   metFORMIN (GLUCOPHAGE) 500 mg tablet Take  by mouth two (2) times daily (with meals). Provider, Historical   estradioL (ESTRACE) 0.01 % (0.1 mg/gram) vaginal cream Insert 2 g into vagina daily. Provider, Historical            Objective:     Visit Vitals  BP (!) 118/54 (BP 1 Location: Left arm, BP Patient Position: Sitting)   Pulse 65   Temp 97.3 °F (36.3 °C)   Ht 5' 5\" (1.651 m)   Wt 121 lb (54.9 kg)   SpO2 97%   BMI 20.14 kg/m²        Physical Exam  Vitals signs reviewed. Constitutional:       General: She is awake. She is not in acute distress. Appearance: Normal appearance. She is well-groomed and normal weight. HENT:      Head: Normocephalic and atraumatic. Jaw: There is normal jaw occlusion. No trismus, tenderness or malocclusion. Salivary Glands: Right salivary gland is not diffusely enlarged or tender.  Left salivary gland is not diffusely enlarged or tender. Right Ear: Hearing, tympanic membrane, ear canal and external ear normal. There is impacted cerumen. Left Ear: Hearing, tympanic membrane, ear canal and external ear normal.      Ears:      Piña exam findings: does not lateralize. Right Rinne: AC > BC. Left Rinne: AC > BC. Comments:   Procedure - Removal Impacted Cerumen    Indications: cerumen impaction    Ears are examined under microscope/headlight.  right ears are cleaned using otologic curette, suction, and/or alligator forceps. Nose: Septal deviation (caudal to right, posterior to left) present. No nasal deformity or mucosal edema. Right Nostril: No epistaxis. Left Nostril: No epistaxis. Right Turbinates: Not enlarged, swollen or pale. Left Turbinates: Not enlarged, swollen or pale. Right Sinus: No maxillary sinus tenderness or frontal sinus tenderness. Left Sinus: No maxillary sinus tenderness or frontal sinus tenderness. Comments: Well healed right nasal flap  Right caudal septum deviation, collapse of right anterior nasal valve    Left septum anterior telangiectasia, no crusting today     Mouth/Throat:      Lips: Pink. No lesions. Mouth: Mucous membranes are moist. No oral lesions. Dentition: Normal dentition. No dental caries. Tongue: No lesions. Palate: No mass and lesions. Pharynx: Oropharynx is clear. Uvula midline. No oropharyngeal exudate or posterior oropharyngeal erythema. Tonsils: No tonsillar exudate. 0 on the right. 0 on the left. Eyes:      General: Vision grossly intact. Extraocular Movements: Extraocular movements intact. Right eye: No nystagmus. Left eye: No nystagmus. Conjunctiva/sclera: Conjunctivae normal.      Pupils: Pupils are equal, round, and reactive to light. Neck:      Musculoskeletal: No edema or erythema.       Thyroid: No thyroid mass, thyromegaly or thyroid tenderness. Trachea: Trachea and phonation normal. No tracheal tenderness or tracheal deviation. Cardiovascular:      Rate and Rhythm: Normal rate and regular rhythm. Pulmonary:      Effort: Pulmonary effort is normal. No respiratory distress. Breath sounds: No stridor. Musculoskeletal: Normal range of motion. General: No swelling or tenderness. Lymphadenopathy:      Cervical: No cervical adenopathy. Skin:     General: Skin is warm and dry. Findings: No lesion or rash. Neurological:      General: No focal deficit present. Mental Status: She is alert and oriented to person, place, and time. Mental status is at baseline. Cranial Nerves: Cranial nerves are intact. Coordination: Romberg sign negative. Gait: Gait is intact. Comments: Negative Hallpike   Psychiatric:         Mood and Affect: Mood normal.         Behavior: Behavior normal. Behavior is cooperative. Assessment/Plan:     Encounter Diagnoses   Name Primary?  Deviated nasal septum Yes    Obstruction of nasal valve     Epistaxis      Pt does have nasal valve collapse on right - would need surgery to address, which she agrees is not necessary  Cont left sided nasal moisturization  Right ear cleaned  Fu 6 mos    No orders of the defined types were placed in this encounter. Follow-up and Dispositions    · Return in about 6 months (around 5/24/2021).

## 2020-11-24 NOTE — LETTER
11/24/20 Patient: Erich Mohamud YOB: 1923 Date of Visit: 11/24/2020 Rayshawn Beard MD 
[address] [place] [address] VIA In Basket Dear Rayshawn eBard MD, Thank you for referring Ms. Collette Cleary to [facility] EAR, NOSE, THROAT AND ALLERGY CARE for evaluation. My notes for this consultation are attached. If you have questions, please do not hesitate to call me. I look forward to following your patient along with you. Sincerely, Panda Marcus MD

## 2021-05-03 ENCOUNTER — OFFICE VISIT (OUTPATIENT)
Dept: ENDOCRINOLOGY | Age: 86
End: 2021-05-03
Payer: MEDICARE

## 2021-05-03 VITALS
RESPIRATION RATE: 18 BRPM | DIASTOLIC BLOOD PRESSURE: 48 MMHG | TEMPERATURE: 97 F | HEART RATE: 70 BPM | OXYGEN SATURATION: 95 % | WEIGHT: 119 LBS | BODY MASS INDEX: 22.47 KG/M2 | SYSTOLIC BLOOD PRESSURE: 118 MMHG | HEIGHT: 61 IN

## 2021-05-03 DIAGNOSIS — E55.9 VITAMIN D DEFICIENCY: ICD-10-CM

## 2021-05-03 DIAGNOSIS — M81.0 AGE RELATED OSTEOPOROSIS, UNSPECIFIED PATHOLOGICAL FRACTURE PRESENCE: Primary | ICD-10-CM

## 2021-05-03 PROCEDURE — G8427 DOCREV CUR MEDS BY ELIG CLIN: HCPCS | Performed by: INTERNAL MEDICINE

## 2021-05-03 PROCEDURE — G8536 NO DOC ELDER MAL SCRN: HCPCS | Performed by: INTERNAL MEDICINE

## 2021-05-03 PROCEDURE — 1101F PT FALLS ASSESS-DOCD LE1/YR: CPT | Performed by: INTERNAL MEDICINE

## 2021-05-03 PROCEDURE — G8510 SCR DEP NEG, NO PLAN REQD: HCPCS | Performed by: INTERNAL MEDICINE

## 2021-05-03 PROCEDURE — 1090F PRES/ABSN URINE INCON ASSESS: CPT | Performed by: INTERNAL MEDICINE

## 2021-05-03 PROCEDURE — G8420 CALC BMI NORM PARAMETERS: HCPCS | Performed by: INTERNAL MEDICINE

## 2021-05-03 PROCEDURE — 99214 OFFICE O/P EST MOD 30 MIN: CPT | Performed by: INTERNAL MEDICINE

## 2021-05-03 NOTE — LETTER
5/7/2021 Patient: Aileen Justin YOB: 1923 Date of Visit: 5/3/2021 Velinda Babinski, MD 
[address] [place] [id number] Via Fax: [phone] Dear Velinda Babinski, MD, Thank you for referring Ms. Collette Cleary to [facility] for evaluation. My notes for this consultation are attached. If you have questions, please do not hesitate to call me. I look forward to following your patient along with you. Sincerely, Dionte Marroquin MD

## 2021-05-03 NOTE — PROGRESS NOTES
Nidhi Swenson is a 80 y.o. female here for   Chief Complaint   Patient presents with    Osteoporosis       1. Have you been to the ER, urgent care clinic since your last visit? Hospitalized since your last visit? -no    2. Have you seen or consulted any other health care providers outside of the [address] [place] [address] since your last visit? Include any pap smears or colon screening. -PCP and Urology

## 2021-05-03 NOTE — PROGRESS NOTES
Jihan Finney MD        Patient Information  Date:5/3/2021  Name : Judson Cleary 80 y.o.     YOB: 1923         Referred by: Marisela King MD       Chief Complaint   Patient presents with    Osteoporosis       History of Present Illness: Anaya Murdock is a 80 y.o. female here for follow-up of osteoporosis. She was diagnosed with osteoporosis 10 years ago, was on Prolia since 2014, initially got it from [address] [facility]. She has underlying GERD, no peptic ulcer disease. Left hip fracture after a fall in 2017  She is on calcium and vitamin D supplements  No severe backache  No dental issues, no falls  She had the last infusion at [facility]         Dietary calcium Intake: Moderate amount of dairy in her diet. On calcium and vitamin D supplements. Past Medical History:   Diagnosis Date    DM (diabetes mellitus) ([address]     High cholesterol     History of multiple allergies     HTN (hypertension)     Hypertension     Osteoporosis     Type 2 diabetes mellitus (HCC)      Past Surgical History:   Procedure Laterality Date    HX APPENDECTOMY      HX HIP ARTHROSCOPY      HX HIP REPLACEMENT      HX HYSTERECTOMY      HX KNEE ARTHROSCOPY       Current Outpatient Medications   Medication Sig    ketoconazole (NIZORAL) 2 % topical cream Apply  to affected area daily.  diclofenac (VOLTAREN) 1 % gel Apply  to affected area as needed for Pain.  losartan-hydroCHLOROthiazide (HYZAAR) 50-12.5 mg per tablet Take 1 Tab by mouth daily.  acetaminophen (Tylenol Extra Strength) 500 mg tablet Take 500 mg by mouth five (5) times daily.  amLODIPine (NORVASC) 10 mg tablet Take 10 mg by mouth daily.  aspirin delayed-release 81 mg tablet Take 81 mg by mouth daily.  atorvastatin (LIPITOR) 20 mg tablet Take 20 mg by mouth daily.     sodium chloride-aloe vera (Ayr Saline) topical gel Apply  to affected area once.  calcium-cholecalciferol, D3, (CALTRATE 600+D) tablet Take 1 Tab by mouth daily.  docusate sodium (Colace) 100 mg capsule Take 100 mg by mouth as needed.  esomeprazole (NEXIUM) 40 mg capsule Take  by mouth daily.  SITagliptin (Januvia) 50 mg tablet Take 50 mg by mouth daily.  pregabalin (LYRICA) 100 mg capsule Take  by mouth two (2) times a day.  metFORMIN (GLUCOPHAGE) 500 mg tablet Take 500 mg by mouth two (2) times daily (with meals).  estradioL (ESTRACE) 0.01 % (0.1 mg/gram) vaginal cream Insert 2 g into vagina. Twice a week    valsartan-hydroCHLOROthiazide (DIOVAN-HCT) 160-12.5 mg per tablet Take 1 Tab by mouth daily. No current facility-administered medications for this visit. No Known Allergies      Review of Systems:  All 10 systems  reviewed and are negative other than mentioned in HPI    Physical Examination:  Height 5' 0.5\" (1.537 m). Body mass index is 23.24 kg/m². - General: pleasant, no distress, good eye contact  - HEENT: no exophthalmos, no periorbital edema, EOMI  - Neck: No thyromegaly  - CVS: S1-S2 regular  - RS: Normal respiratory effort  - Musculoskeletal: no tremors  - Neurological: alert and oriented  - Psychiatric: normal mood and affect  - Skin: Normal color    Data Reviewed:         Assessment/Plan:     1. Age related osteoporosis, unspecified pathological fracture presence        Age-related osteoporosis  Prolia started in November 2019 by me, prior to that she was on Prolia since 2014  Did not want IV Reclast  Labs today    Due for DXA, will postpone due to Covid    Vitamin D deficiency    Bilateral hip pain: Better, no thigh pain    Hypertension: Controlled    Hyperlipidemia: Could discontinue statin,            Thank you for allowing me to participate in the care of this patient. Aminata Rivas MD    Patient Joseph Julien verbalized understanding      There are no Patient Instructions on file for this visit.       Voice-recognition software was used to generate this report, which may result in some phonetic-based errors in the grammar and contents. Even though attempts were made to correct all the mistakes, some may have been missed and remained in the body of the report.

## 2021-05-04 LAB
25(OH)D3 SERPL-MCNC: 44.7 NG/ML (ref 30–100)
ANION GAP SERPL CALC-SCNC: 6 MMOL/L (ref 5–15)
BUN SERPL-MCNC: 29 MG/DL (ref 6–20)
BUN/CREAT SERPL: 41 (ref 12–20)
CALCIUM SERPL-MCNC: 9.3 MG/DL (ref 8.5–10.1)
CHLORIDE SERPL-SCNC: 107 MMOL/L (ref 97–108)
CO2 SERPL-SCNC: 28 MMOL/L (ref 21–32)
CREAT SERPL-MCNC: 0.71 MG/DL (ref 0.55–1.02)
GLUCOSE SERPL-MCNC: 104 MG/DL (ref 65–100)
POTASSIUM SERPL-SCNC: 4 MMOL/L (ref 3.5–5.1)
SODIUM SERPL-SCNC: 141 MMOL/L (ref 136–145)

## 2021-05-06 DIAGNOSIS — M81.0 AGE-RELATED OSTEOPOROSIS WITHOUT CURRENT PATHOLOGICAL FRACTURE: Primary | ICD-10-CM

## 2021-05-06 RX ORDER — DIPHENHYDRAMINE HYDROCHLORIDE 50 MG/ML
50 INJECTION, SOLUTION INTRAMUSCULAR; INTRAVENOUS AS NEEDED
Status: CANCELLED
Start: 2021-05-13

## 2021-05-06 RX ORDER — HYDROCORTISONE SODIUM SUCCINATE 100 MG/2ML
100 INJECTION, POWDER, FOR SOLUTION INTRAMUSCULAR; INTRAVENOUS AS NEEDED
Status: CANCELLED | OUTPATIENT
Start: 2021-05-13

## 2021-05-06 RX ORDER — DIPHENHYDRAMINE HYDROCHLORIDE 50 MG/ML
25 INJECTION, SOLUTION INTRAMUSCULAR; INTRAVENOUS AS NEEDED
Status: CANCELLED
Start: 2021-05-13

## 2021-05-06 RX ORDER — EPINEPHRINE 1 MG/ML
0.3 INJECTION, SOLUTION, CONCENTRATE INTRAVENOUS AS NEEDED
Status: CANCELLED | OUTPATIENT
Start: 2021-05-13

## 2021-05-06 RX ORDER — ACETAMINOPHEN 325 MG/1
650 TABLET ORAL AS NEEDED
Status: CANCELLED
Start: 2021-05-13

## 2021-05-06 RX ORDER — ALBUTEROL SULFATE 0.83 MG/ML
2.5 SOLUTION RESPIRATORY (INHALATION) AS NEEDED
Status: CANCELLED
Start: 2021-05-13

## 2021-05-06 RX ORDER — ONDANSETRON 2 MG/ML
8 INJECTION INTRAMUSCULAR; INTRAVENOUS AS NEEDED
Status: CANCELLED | OUTPATIENT
Start: 2021-05-13

## 2021-05-13 ENCOUNTER — HOSPITAL ENCOUNTER (OUTPATIENT)
Dept: INFUSION THERAPY | Age: 86
Discharge: HOME | End: 2021-05-13
Payer: MEDICARE

## 2021-05-13 VITALS
SYSTOLIC BLOOD PRESSURE: 153 MMHG | RESPIRATION RATE: 16 BRPM | HEART RATE: 73 BPM | OXYGEN SATURATION: 95 % | DIASTOLIC BLOOD PRESSURE: 70 MMHG | TEMPERATURE: 96.8 F

## 2021-05-13 DIAGNOSIS — M81.0 AGE-RELATED OSTEOPOROSIS WITHOUT CURRENT PATHOLOGICAL FRACTURE: Primary | ICD-10-CM

## 2021-05-13 DIAGNOSIS — M81.0 AGE-RELATED OSTEOPOROSIS WITHOUT CURRENT PATHOLOGICAL FRACTURE: ICD-10-CM

## 2021-05-13 PROCEDURE — 74011250636 HC RX REV CODE- 250/636: Performed by: INTERNAL MEDICINE

## 2021-05-13 PROCEDURE — 96372 THER/PROPH/DIAG INJ SC/IM: CPT

## 2021-05-13 RX ORDER — HYDROCORTISONE SODIUM SUCCINATE 100 MG/2ML
100 INJECTION, POWDER, FOR SOLUTION INTRAMUSCULAR; INTRAVENOUS AS NEEDED
Status: CANCELLED | OUTPATIENT
Start: 2021-11-11

## 2021-05-13 RX ORDER — ACETAMINOPHEN 325 MG/1
650 TABLET ORAL AS NEEDED
Status: CANCELLED
Start: 2021-11-11

## 2021-05-13 RX ORDER — ALBUTEROL SULFATE 0.83 MG/ML
2.5 SOLUTION RESPIRATORY (INHALATION) AS NEEDED
Status: CANCELLED
Start: 2021-11-11

## 2021-05-13 RX ORDER — DIPHENHYDRAMINE HYDROCHLORIDE 50 MG/ML
50 INJECTION, SOLUTION INTRAMUSCULAR; INTRAVENOUS AS NEEDED
Status: CANCELLED
Start: 2021-11-11

## 2021-05-13 RX ORDER — EPINEPHRINE 1 MG/ML
0.3 INJECTION, SOLUTION, CONCENTRATE INTRAVENOUS AS NEEDED
Status: CANCELLED | OUTPATIENT
Start: 2021-11-11

## 2021-05-13 RX ORDER — ONDANSETRON 2 MG/ML
8 INJECTION INTRAMUSCULAR; INTRAVENOUS AS NEEDED
Status: CANCELLED | OUTPATIENT
Start: 2021-11-11

## 2021-05-13 RX ORDER — DIPHENHYDRAMINE HYDROCHLORIDE 50 MG/ML
25 INJECTION, SOLUTION INTRAMUSCULAR; INTRAVENOUS AS NEEDED
Status: CANCELLED
Start: 2021-11-11

## 2021-05-13 RX ADMIN — DENOSUMAB 60 MG: 60 INJECTION SUBCUTANEOUS at 11:47

## 2021-05-13 NOTE — PROGRESS NOTES
[facility] Progress Note    Date: May 13, 2021    Name: Hannae Bird    MRN: [id number]         : 1923    Ms. Cleary Arrived ambulatory and in no distress for Prolia. Assessment was completed, no acute issues at this time, no new complaints voiced. Patient denies any recent invasive dental work. Prior to treatment, patient was screened for COVID 19. Denies any signs or symptoms of COVID. Denies any known physical contact with anyone exposed to, diagnosed with or with pending or positive COVID test. Denies any pending or positive COVID test themself. Ms. Dumont Sirwaldo vitals were reviewed. Visit Vitals  BP (!) 153/70   Pulse 73   Temp 96.8 °F (36 °C)   Resp 16   SpO2 95%   Breastfeeding No     Labs obtained on  and WNL. Medications:  Medications Administered     denosumab (PROLIA) injection 60 mg     Admin Date  2021 Action  Given Dose  60 mg Route  SubCUTAneous Administered By  Wilma Hsu RN              Ms. Yung Sol tolerated treatment well and was discharged from [place] [address] in stable condition at 1150. She has completed treatment course and has no future appointments scheduled at the [address] [place] [address] at this time.      Kyra Hardy RN  May 13, 2021

## 2021-05-24 ENCOUNTER — OFFICE VISIT (OUTPATIENT)
Dept: ENT CLINIC | Age: 86
End: 2021-05-24
Payer: MEDICARE

## 2021-05-24 VITALS
TEMPERATURE: 97.3 F | HEIGHT: 61 IN | SYSTOLIC BLOOD PRESSURE: 112 MMHG | RESPIRATION RATE: 18 BRPM | OXYGEN SATURATION: 98 % | WEIGHT: 121 LBS | BODY MASS INDEX: 22.84 KG/M2 | HEART RATE: 71 BPM | DIASTOLIC BLOOD PRESSURE: 68 MMHG

## 2021-05-24 DIAGNOSIS — J34.89 OBSTRUCTION OF NASAL VALVE: ICD-10-CM

## 2021-05-24 DIAGNOSIS — H61.21 IMPACTED CERUMEN OF RIGHT EAR: ICD-10-CM

## 2021-05-24 DIAGNOSIS — J31.0 CHRONIC RHINITIS: Primary | ICD-10-CM

## 2021-05-24 DIAGNOSIS — J34.2 DEVIATED NASAL SEPTUM: ICD-10-CM

## 2021-05-24 PROCEDURE — 1090F PRES/ABSN URINE INCON ASSESS: CPT | Performed by: OTOLARYNGOLOGY

## 2021-05-24 PROCEDURE — G8420 CALC BMI NORM PARAMETERS: HCPCS | Performed by: OTOLARYNGOLOGY

## 2021-05-24 PROCEDURE — G8536 NO DOC ELDER MAL SCRN: HCPCS | Performed by: OTOLARYNGOLOGY

## 2021-05-24 PROCEDURE — 69210 REMOVE IMPACTED EAR WAX UNI: CPT | Performed by: OTOLARYNGOLOGY

## 2021-05-24 PROCEDURE — G8510 SCR DEP NEG, NO PLAN REQD: HCPCS | Performed by: OTOLARYNGOLOGY

## 2021-05-24 PROCEDURE — 99213 OFFICE O/P EST LOW 20 MIN: CPT | Performed by: OTOLARYNGOLOGY

## 2021-05-24 PROCEDURE — 1101F PT FALLS ASSESS-DOCD LE1/YR: CPT | Performed by: OTOLARYNGOLOGY

## 2021-05-24 PROCEDURE — G8427 DOCREV CUR MEDS BY ELIG CLIN: HCPCS | Performed by: OTOLARYNGOLOGY

## 2021-05-24 NOTE — PROGRESS NOTES
The patient presents today for 6 month follow up for ear wax check. The patient stated that she is also experience nasal dryness.

## 2021-05-24 NOTE — PROGRESS NOTES
Subjective:    Vicki Cleary   80 y.o.   9/20/1923     Followup visit    Original HPI  Location - nose, ear    Quality - left ear blocked    Severity -  moderate    Duration - few weeks    Timing - ongoing, chronic    Context - f/u pt of Ditto - pt does have baseline right nasal obstruction, mostly from a Mohs recon to right nasal ala region in past; c/o intermittent crusting left side which causes obstruction as well; she does c/o left ear feels blocked lately also    Modifying Features - gel/saline helps some but not enough    Associated symptoms/signs - none    Follow-up HPI   5/24/2021 -6-month follow-up today, patient states symptoms are essentially the same she has some concerns over the outside of her nasal skin she is worried about the crusting on the outside and crusting on the inside potentially giving her risk for through and through hole in the nose, she continues with using gel daily and still feels the left nasal passage is more dry    Review of Systems  Review of Systems   Constitutional: Negative for chills and fever. HENT: Positive for congestion and hearing loss. Negative for ear pain and tinnitus. Eyes: Negative for blurred vision and double vision. Respiratory: Negative for cough, sputum production and shortness of breath. Cardiovascular: Negative for chest pain and palpitations. Gastrointestinal: Negative for heartburn, nausea and vomiting. Musculoskeletal: Negative for joint pain and neck pain. Skin: Positive for itching. Neurological: Negative for dizziness, speech change, weakness and headaches. Endo/Heme/Allergies: Negative for environmental allergies. Does not bruise/bleed easily. Psychiatric/Behavioral: Negative for memory loss. The patient does not have insomnia.           Past Medical History:   Diagnosis Date    DM [facility]di[facility]) [facility] [address]     High cholesterol     History of multiple allergies     HTN (hypertension)     Hypertension     Osteoporosis     Type 2 diabetes mellitus ([facility]      Prior to Admission medications    Medication Sig Start Date End Date Taking? Authorizing Provider   ketoconazole (NIZORAL) 2 % topical cream Apply  to affected area daily. Provider, Historical   diclofenac (VOLTAREN) 1 % gel Apply  to affected area as needed for Pain. Provider, Historical   losartan-hydroCHLOROthiazide (HYZAAR) 50-12.5 mg per tablet Take 1 Tab by mouth daily. Provider, Historical   acetaminophen (Tylenol Extra Strength) 500 mg tablet Take 500 mg by mouth five (5) times daily. Provider, Historical   amLODIPine (NORVASC) 10 mg tablet Take 10 mg by mouth daily. Provider, Historical   aspirin delayed-release 81 mg tablet Take 81 mg by mouth daily. Provider, Historical   atorvastatin (LIPITOR) 20 mg tablet Take 20 mg by mouth daily. Provider, Historical   sodium chloride-aloe vera (Ayr Saline) topical gel Apply  to affected area once. Provider, Historical   calcium-cholecalciferol, D3, (CALTRATE 600+D) tablet Take 1 Tab by mouth daily. Provider, Historical   docusate sodium (Colace) 100 mg capsule Take 100 mg by mouth as needed. Provider, Historical   esomeprazole (NEXIUM) 40 mg capsule Take  by mouth daily. Provider, Historical   SITagliptin (Januvia) 50 mg tablet Take 50 mg by mouth daily. Provider, Historical   valsartan-hydroCHLOROthiazide (DIOVAN-HCT) 160-12.5 mg per tablet Take 1 Tab by mouth daily. Provider, Historical   pregabalin (LYRICA) 100 mg capsule Take  by mouth two (2) times a day. Provider, Historical   metFORMIN (GLUCOPHAGE) 500 mg tablet Take 500 mg by mouth two (2) times daily (with meals). Provider, Historical   estradioL (ESTRACE) 0.01 % (0.1 mg/gram) vaginal cream Insert 2 g into vagina.  Twice a week    Provider, Historical            Objective:     Visit Vitals  /68 (BP 1 Location: Left upper arm, BP Patient Position: Sitting, BP Cuff Size: Adult)   Pulse 71   Temp 97.3 °F (36.3 °C)   Resp 18   Ht 5' 0.5\" (1.537 m)   Wt 121 lb (54.9 kg)   SpO2 98%   BMI 23.24 kg/m²        Physical Exam  Vitals reviewed. Constitutional:       General: She is awake. She is not in acute distress. Appearance: Normal appearance. She is well-groomed and normal weight. HENT:      Head: Normocephalic and atraumatic. Jaw: There is normal jaw occlusion. No trismus, tenderness or malocclusion. Salivary Glands: Right salivary gland is not diffusely enlarged or tender. Left salivary gland is not diffusely enlarged or tender. Right Ear: Hearing, tympanic membrane, ear canal and external ear normal. There is impacted cerumen. Left Ear: Hearing, tympanic membrane, ear canal and external ear normal.      Ears:      Piña exam findings: does not lateralize. Right Rinne: AC > BC. Left Rinne: AC > BC. Comments:        Nose: Septal deviation (caudal to right, posterior to left) present. No nasal deformity or mucosal edema. Right Nostril: No epistaxis. Left Nostril: No epistaxis. Right Turbinates: Not enlarged, swollen or pale. Left Turbinates: Not enlarged, swollen or pale. Right Sinus: No maxillary sinus tenderness or frontal sinus tenderness. Left Sinus: No maxillary sinus tenderness or frontal sinus tenderness. Comments: Well healed right nasal flap  Right caudal septum deviation, collapse of right anterior nasal valve       Mouth/Throat:      Lips: Pink. No lesions. Mouth: Mucous membranes are moist. No oral lesions. Dentition: Normal dentition. No dental caries. Tongue: No lesions. Palate: No mass and lesions. Pharynx: Oropharynx is clear. Uvula midline. No oropharyngeal exudate or posterior oropharyngeal erythema. Tonsils: No tonsillar exudate. 0 on the right. 0 on the left. Eyes:      General: Vision grossly intact. Extraocular Movements: Extraocular movements intact. Right eye: No nystagmus. Left eye: No nystagmus. Conjunctiva/sclera: Conjunctivae normal.      Pupils: Pupils are equal, round, and reactive to light. Neck:      Thyroid: No thyroid mass, thyromegaly or thyroid tenderness. Trachea: Trachea and phonation normal. No tracheal tenderness or tracheal deviation. Cardiovascular:      Rate and Rhythm: Normal rate and regular rhythm. Pulmonary:      Effort: Pulmonary effort is normal. No respiratory distress. Breath sounds: No stridor. Musculoskeletal:         General: No swelling or tenderness. Normal range of motion. Cervical back: No edema or erythema. Lymphadenopathy:      Cervical: No cervical adenopathy. Skin:     General: Skin is warm and dry. Findings: No lesion or rash. Neurological:      General: No focal deficit present. Mental Status: She is alert and oriented to person, place, and time. Mental status is at baseline. Cranial Nerves: Cranial nerves are intact. Coordination: Romberg sign negative. Gait: Gait is intact. Psychiatric:         Mood and Affect: Mood normal.         Behavior: Behavior normal. Behavior is cooperative. Procedure - Removal Impacted Cerumen    Indications: cerumen impaction    Ears are examined under microscope/headlight.  right ears are cleaned using otologic curette, suction, and/or alligator forceps. Assessment/Plan:     Encounter Diagnoses   Name Primary?  Chronic rhinitis Yes    Obstruction of nasal valve     Deviated nasal septum     Impacted cerumen of right ear      Pt does have nasal valve collapse on right - would need surgery to address, which she agrees is not necessary  Cont left sided nasal moisturization with Ayr gel twice daily  External nose is healthy, patient reassured  Right ear cleaned  Fu 6 mos    Orders Placed This Encounter    REMOVE IMPACTED EAR WAX     Follow-up and Dispositions    · Return in about 6 months (around 11/24/2021).

## 2021-11-07 DIAGNOSIS — M81.0 AGE-RELATED OSTEOPOROSIS WITHOUT CURRENT PATHOLOGICAL FRACTURE: ICD-10-CM

## 2021-12-13 ENCOUNTER — OFFICE VISIT (OUTPATIENT)
Dept: ENDOCRINOLOGY | Age: 86
End: 2021-12-13
Payer: MEDICARE

## 2021-12-13 VITALS
DIASTOLIC BLOOD PRESSURE: 43 MMHG | WEIGHT: 119.3 LBS | SYSTOLIC BLOOD PRESSURE: 118 MMHG | TEMPERATURE: 97.1 F | OXYGEN SATURATION: 98 % | BODY MASS INDEX: 22.52 KG/M2 | HEIGHT: 61 IN | HEART RATE: 69 BPM

## 2021-12-13 DIAGNOSIS — E55.9 VITAMIN D DEFICIENCY: ICD-10-CM

## 2021-12-13 DIAGNOSIS — M81.0 AGE-RELATED OSTEOPOROSIS WITHOUT CURRENT PATHOLOGICAL FRACTURE: Primary | ICD-10-CM

## 2021-12-13 LAB
25(OH)D3 SERPL-MCNC: 49.1 NG/ML (ref 30–100)
ANION GAP SERPL CALC-SCNC: 7 MMOL/L (ref 5–15)
BUN SERPL-MCNC: 22 MG/DL (ref 6–20)
BUN/CREAT SERPL: 29 (ref 12–20)
CALCIUM SERPL-MCNC: 10.1 MG/DL (ref 8.5–10.1)
CHLORIDE SERPL-SCNC: 104 MMOL/L (ref 97–108)
CO2 SERPL-SCNC: 30 MMOL/L (ref 21–32)
CREAT SERPL-MCNC: 0.75 MG/DL (ref 0.55–1.02)
GLUCOSE SERPL-MCNC: 143 MG/DL (ref 65–100)
POTASSIUM SERPL-SCNC: 3.8 MMOL/L (ref 3.5–5.1)
SODIUM SERPL-SCNC: 141 MMOL/L (ref 136–145)

## 2021-12-13 PROCEDURE — G8536 NO DOC ELDER MAL SCRN: HCPCS | Performed by: INTERNAL MEDICINE

## 2021-12-13 PROCEDURE — 99214 OFFICE O/P EST MOD 30 MIN: CPT | Performed by: INTERNAL MEDICINE

## 2021-12-13 PROCEDURE — G8420 CALC BMI NORM PARAMETERS: HCPCS | Performed by: INTERNAL MEDICINE

## 2021-12-13 PROCEDURE — 1090F PRES/ABSN URINE INCON ASSESS: CPT | Performed by: INTERNAL MEDICINE

## 2021-12-13 PROCEDURE — 1101F PT FALLS ASSESS-DOCD LE1/YR: CPT | Performed by: INTERNAL MEDICINE

## 2021-12-13 PROCEDURE — G8432 DEP SCR NOT DOC, RNG: HCPCS | Performed by: INTERNAL MEDICINE

## 2021-12-13 PROCEDURE — G8427 DOCREV CUR MEDS BY ELIG CLIN: HCPCS | Performed by: INTERNAL MEDICINE

## 2021-12-13 NOTE — PROGRESS NOTES
Miguel Hui MD        Patient Information  Date:12/14/2021  Name : Lori Cleary 80 y.o.     YOB: 1923         Referred by: Claudine Doshi MD       Osteoporosis follow-up    History of Present Illness: Suzi Henderson is a 80 y.o. female here for follow-up of osteoporosis. She was diagnosed with osteoporosis 10 years ago, was on Prolia since 2014, initially got it from [facility]. She has underlying GERD, no peptic ulcer disease. Left hip fracture after a fall in 2017  She is on calcium and vitamin D supplements  No severe backache  No dental issues, no falls  She had the last infusion at [facility], wanted to get it at [facility] which is closer   labs today        Dietary calcium Intake: Moderate amount of dairy in her diet. On calcium and vitamin D supplements. Past Medical History:   Diagnosis Date    DM (diabetes mellitus) ([address]     High cholesterol     History of multiple allergies     HTN (hypertension)     Hypertension     Osteoporosis     Type 2 diabetes mellitus (HCC)      Past Surgical History:   Procedure Laterality Date    HX APPENDECTOMY      HX HIP ARTHROSCOPY      HX HIP REPLACEMENT      HX HYSTERECTOMY      HX KNEE ARTHROSCOPY       Current Outpatient Medications   Medication Sig    medical supply, miscellaneous (OCUSOFT LID SCRUB) by Does Not Apply route. As needed    ketoconazole (NIZORAL) 2 % topical cream Apply  to affected area daily.  diclofenac (VOLTAREN) 1 % gel Apply  to affected area as needed for Pain.  losartan-hydroCHLOROthiazide (HYZAAR) 50-12.5 mg per tablet Take 1 Tab by mouth daily.  acetaminophen (Tylenol Extra Strength) 500 mg tablet Take 500 mg by mouth five (5) times daily.  amLODIPine (NORVASC) 10 mg tablet Take 10 mg by mouth daily.  aspirin delayed-release 81 mg tablet Take 81 mg by mouth daily.     atorvastatin (LIPITOR) 20 mg tablet Take 20 mg by mouth daily.  sodium chloride-aloe vera (Ayr Saline) topical gel Apply  to affected area once.  calcium-cholecalciferol, D3, (CALTRATE 600+D) tablet Take 1 Tab by mouth daily.  docusate sodium (Colace) 100 mg capsule Take 100 mg by mouth as needed.  esomeprazole (NEXIUM) 40 mg capsule Take  by mouth daily.  SITagliptin (Januvia) 50 mg tablet Take 50 mg by mouth daily.  pregabalin (LYRICA) 100 mg capsule Take  by mouth two (2) times a day.  metFORMIN (GLUCOPHAGE) 500 mg tablet Take 500 mg by mouth two (2) times daily (with meals).  estradioL (ESTRACE) 0.01 % (0.1 mg/gram) vaginal cream Insert 2 g into vagina. Twice a week    valsartan-hydroCHLOROthiazide (DIOVAN-HCT) 160-12.5 mg per tablet Take 1 Tab by mouth daily. (Patient not taking: Reported on 12/13/2021)     No current facility-administered medications for this visit. No Known Allergies      Review of Systems:  All 10 systems  reviewed and are negative other than mentioned in HPI    Physical Examination:  Blood pressure (!) 118/43, pulse 69, temperature 97.1 °F (36.2 °C), temperature source Temporal, height 5' 0.5\" (1.537 m), weight 119 lb 4.8 oz (54.1 kg), SpO2 98 %. Body mass index is 22.92 kg/m².   - General: pleasant, no distress, good eye contact  - HEENT: no exophthalmos, no periorbital edema, EOMI  - Neck: No thyromegaly  - CVS: S1-S2 regular  - RS: Normal respiratory effort  - Musculoskeletal: no tremors  - Neurological: alert and oriented  - Psychiatric: normal mood and affect  - Skin: Normal color    Data Reviewed:     Lab Results   Component Value Date/Time    Calcium 10.1 12/13/2021 10:57 AM    Phosphorus 3.5 05/06/2020 01:23 PM     Lab Results   Component Value Date/Time    Vitamin D 25-Hydroxy 49.1 12/13/2021 10:57 AM       No results found for: TSH, TSH2, TSH3, TSHP, TSHEXT  Lab Results   Component Value Date/Time    Sodium 141 12/13/2021 10:57 AM    Potassium 3.8 12/13/2021 10:57 AM    Chloride 104 12/13/2021 10:57 AM    CO2 30 12/13/2021 10:57 AM    Anion gap 7 12/13/2021 10:57 AM    Glucose 143 (H) 12/13/2021 10:57 AM    BUN 22 (H) 12/13/2021 10:57 AM    Creatinine 0.75 12/13/2021 10:57 AM    BUN/Creatinine ratio 29 (H) 12/13/2021 10:57 AM    GFR est AA >60 12/13/2021 10:57 AM    GFR est non-AA >60 12/13/2021 10:57 AM    Calcium 10.1 12/13/2021 10:57 AM       Assessment/Plan:     1. Age-related osteoporosis without current pathological fracture    2. Vitamin D deficiency        Age-related osteoporosis  Prolia started in November 2019 by me, prior to that she was on Prolia since 2014  Did not want IV Reclast  Due for Prolia, will schedule it at [facility] today  Due for DXA, she would like to postpone, will plan during summer 2022    Vitamin D deficiency      Hypertension: Controlled    Hyperlipidemia: Could discontinue statin,            Thank you for allowing me to participate in the care of this patient. John Tierney MD    Patient John High verbalized understanding      There are no Patient Instructions on file for this visit. Follow-up and Dispositions    · Return in about 6 months (around 6/13/2022). Voice-recognition software was used to generate this report, which may result in some phonetic-based errors in the grammar and contents. Even though attempts were made to correct all the mistakes, some may have been missed and remained in the body of the report.

## 2021-12-13 NOTE — PROGRESS NOTES
Arsh Ruiz is a 80 y.o. female here for No chief complaint on file. 1. Have you been to the ER, urgent care clinic since your last visit? Hospitalized since your last visit? - no    2. Have you seen or consulted any other health care providers outside of the [address] [place] [address] since your last visit?   Include any pap smears or colon screening.- yes Urologist

## 2021-12-13 NOTE — LETTER
12/14/2021    Patient: Carmelita Ellison   YOB: 1923   Date of Visit: 12/13/2021     Agnes Mandujano MD  [address] [facility]  [address] [facility] [address]  Via Fax: [phone]    Dear Agnes Mandujano MD,      Thank you for referring Ms. Collette Cleary to [facility] for evaluation. My notes for this consultation are attached. If you have questions, please do not hesitate to call me. I look forward to following your patient along with you.       Sincerely,    Aashish Nath MD

## 2021-12-21 ENCOUNTER — HOSPITAL ENCOUNTER (OUTPATIENT)
Dept: INFUSION THERAPY | Age: 86
Discharge: HOME | End: 2021-12-21
Payer: MEDICARE

## 2021-12-21 VITALS
BODY MASS INDEX: 22.47 KG/M2 | RESPIRATION RATE: 18 BRPM | DIASTOLIC BLOOD PRESSURE: 62 MMHG | WEIGHT: 119 LBS | TEMPERATURE: 97.7 F | HEIGHT: 61 IN | OXYGEN SATURATION: 97 % | HEART RATE: 70 BPM | SYSTOLIC BLOOD PRESSURE: 156 MMHG

## 2021-12-21 LAB
ALBUMIN SERPL-MCNC: 3.1 G/DL (ref 3.5–5)
ANION GAP SERPL CALC-SCNC: 5 MMOL/L (ref 5–15)
BUN SERPL-MCNC: 24 MG/DL (ref 6–20)
BUN/CREAT SERPL: 34 (ref 12–20)
CA-I BLD-MCNC: 9.6 MG/DL (ref 8.5–10.1)
CHLORIDE SERPL-SCNC: 106 MMOL/L (ref 97–108)
CO2 SERPL-SCNC: 32 MMOL/L (ref 21–32)
CREAT SERPL-MCNC: 0.7 MG/DL (ref 0.55–1.02)
GLUCOSE SERPL-MCNC: 122 MG/DL (ref 65–100)
MAGNESIUM SERPL-MCNC: 2 MG/DL (ref 1.6–2.4)
PHOSPHATE SERPL-MCNC: 3.8 MG/DL (ref 2.6–4.7)
POTASSIUM SERPL-SCNC: 3.8 MMOL/L (ref 3.5–5.1)
SODIUM SERPL-SCNC: 143 MMOL/L (ref 136–145)

## 2021-12-21 PROCEDURE — 74011250636 HC RX REV CODE- 250/636: Performed by: INTERNAL MEDICINE

## 2021-12-21 PROCEDURE — 80069 RENAL FUNCTION PANEL: CPT

## 2021-12-21 PROCEDURE — 96372 THER/PROPH/DIAG INJ SC/IM: CPT

## 2021-12-21 PROCEDURE — 83735 ASSAY OF MAGNESIUM: CPT

## 2021-12-21 PROCEDURE — 36415 COLL VENOUS BLD VENIPUNCTURE: CPT

## 2021-12-21 RX ADMIN — DENOSUMAB 60 MG: 60 INJECTION SUBCUTANEOUS at 12:52

## 2021-12-21 NOTE — PROGRESS NOTES
Patient alert and oriented x4, with respirations even and unlabored. Patient discharged home per physicians order. Patient verbalizes understanding of discharge instructions and next appointment date and time. Patient declines wheelchair transport to front hospital entrance for discharge, patient ambulating with cane and steady gait noted to front entrance.

## 2022-02-01 ENCOUNTER — TELEPHONE (OUTPATIENT)
Dept: ENT CLINIC | Age: 87
End: 2022-02-01

## 2022-02-02 ENCOUNTER — OFFICE VISIT (OUTPATIENT)
Dept: ENT CLINIC | Age: 87
End: 2022-02-02
Payer: MEDICARE

## 2022-02-02 VITALS
RESPIRATION RATE: 16 BRPM | TEMPERATURE: 97.6 F | OXYGEN SATURATION: 97 % | DIASTOLIC BLOOD PRESSURE: 60 MMHG | HEIGHT: 60 IN | WEIGHT: 120 LBS | HEART RATE: 65 BPM | SYSTOLIC BLOOD PRESSURE: 100 MMHG | BODY MASS INDEX: 23.56 KG/M2

## 2022-02-02 DIAGNOSIS — J34.89 OBSTRUCTION OF NASAL VALVE: Primary | ICD-10-CM

## 2022-02-02 DIAGNOSIS — J34.2 DEVIATED NASAL SEPTUM: ICD-10-CM

## 2022-02-02 DIAGNOSIS — H61.23 BILATERAL IMPACTED CERUMEN: ICD-10-CM

## 2022-02-02 PROCEDURE — 69210 REMOVE IMPACTED EAR WAX UNI: CPT | Performed by: OTOLARYNGOLOGY

## 2022-02-02 PROCEDURE — 1090F PRES/ABSN URINE INCON ASSESS: CPT | Performed by: OTOLARYNGOLOGY

## 2022-02-02 PROCEDURE — G8432 DEP SCR NOT DOC, RNG: HCPCS | Performed by: OTOLARYNGOLOGY

## 2022-02-02 PROCEDURE — G8420 CALC BMI NORM PARAMETERS: HCPCS | Performed by: OTOLARYNGOLOGY

## 2022-02-02 PROCEDURE — 99213 OFFICE O/P EST LOW 20 MIN: CPT | Performed by: OTOLARYNGOLOGY

## 2022-02-02 PROCEDURE — 1101F PT FALLS ASSESS-DOCD LE1/YR: CPT | Performed by: OTOLARYNGOLOGY

## 2022-02-02 PROCEDURE — G8536 NO DOC ELDER MAL SCRN: HCPCS | Performed by: OTOLARYNGOLOGY

## 2022-02-02 PROCEDURE — G8427 DOCREV CUR MEDS BY ELIG CLIN: HCPCS | Performed by: OTOLARYNGOLOGY

## 2022-02-02 NOTE — PROGRESS NOTES
Subjective:    Cam Hint Short   80 y.o.   9/20/1923     Followup visit    Original HPI  Location - nose, ear    Quality - left ear blocked    Severity -  moderate    Duration - few weeks    Timing - ongoing, chronic    Context - f/u pt of Ditto - pt does have baseline right nasal obstruction, mostly from a Mohs recon to right nasal ala region in past; c/o intermittent crusting left side which causes obstruction as well; she does c/o left ear feels blocked lately also    Modifying Features - gel/saline helps some but not enough    Associated symptoms/signs - none    Follow-up HPI   5/24/2021 -6-month follow-up today, patient states symptoms are essentially the same she has some concerns over the outside of her nasal skin she is worried about the crusting on the outside and crusting on the inside potentially giving her risk for through and through hole in the nose, she continues with using gel daily and still feels the left nasal passage is more dry    2/2/2022 -9-month follow-up today patient says she is overall doing fine. Using saline gel for the left nasal cavity, does not report any significant bleeding. Review of Systems  Review of Systems   Constitutional: Negative for chills and fever. HENT: Positive for congestion and hearing loss. Negative for ear pain and tinnitus. Eyes: Negative for blurred vision and double vision. Respiratory: Negative for cough, sputum production and shortness of breath. Cardiovascular: Negative for chest pain and palpitations. Gastrointestinal: Negative for heartburn, nausea and vomiting. Musculoskeletal: Negative for joint pain and neck pain. Skin: Positive for itching. Neurological: Negative for dizziness, speech change, weakness and headaches. Endo/Heme/Allergies: Negative for environmental allergies. Does not bruise/bleed easily. Psychiatric/Behavioral: Negative for memory loss. The patient does not have insomnia.           Past Medical History:   Diagnosis Date    DM [facility]diabetes mellitus) ([facility] [address]     High cholesterol     History of multiple allergies     HTN (hypertension)     Hypertension     Osteoporosis     Type 2 diabetes mellitus ([facility]      Prior to Admission medications    Medication Sig Start Date End Date Taking? Authorizing Provider   medical supply, miscellaneous (OCUSOFT LID SCRUB) by Does Not Apply route. As needed   Yes Provider, Historical   losartan-hydroCHLOROthiazide (HYZAAR) 50-12.5 mg per tablet Take 1 Tab by mouth daily. Yes Provider, Historical   acetaminophen (Tylenol Extra Strength) 500 mg tablet Take 500 mg by mouth five (5) times daily. Yes Provider, Historical   amLODIPine (NORVASC) 10 mg tablet Take 10 mg by mouth daily. Yes Provider, Historical   aspirin delayed-release 81 mg tablet Take 81 mg by mouth daily. Yes Provider, Historical   atorvastatin (LIPITOR) 20 mg tablet Take 20 mg by mouth daily. Yes Provider, Historical   calcium-cholecalciferol, D3, (CALTRATE 600+D) tablet Take 1 Tab by mouth daily. Yes Provider, Historical   esomeprazole (NEXIUM) 40 mg capsule Take  by mouth daily. Yes Provider, Historical   SITagliptin (Januvia) 50 mg tablet Take 50 mg by mouth daily. Yes Provider, Historical   pregabalin (LYRICA) 100 mg capsule Take  by mouth two (2) times a day. Yes Provider, Historical   metFORMIN (GLUCOPHAGE) 500 mg tablet Take 500 mg by mouth two (2) times daily (with meals). Yes Provider, Historical   estradioL (ESTRACE) 0.01 % (0.1 mg/gram) vaginal cream Insert 2 g into vagina. Twice a week   Yes Provider, Historical   ketoconazole (NIZORAL) 2 % topical cream Apply  to affected area daily. Patient not taking: Reported on 12/21/2021    Provider, Historical   diclofenac (VOLTAREN) 1 % gel Apply  to affected area as needed for Pain. Patient not taking: Reported on 12/21/2021    Provider, Historical   sodium chloride-aloe vera (Ayr Saline) topical gel Apply  to affected area once.   Patient not taking: Reported on 12/21/2021    Provider, Historical   docusate sodium (Colace) 100 mg capsule Take 100 mg by mouth as needed. Patient not taking: Reported on 12/21/2021    Provider, Historical   valsartan-hydroCHLOROthiazide (DIOVAN-HCT) 160-12.5 mg per tablet Take 1 Tab by mouth daily. Patient not taking: Reported on 12/13/2021    Provider, Historical            Objective:     Visit Vitals  /60 (BP 1 Location: Left upper arm, BP Patient Position: Sitting, BP Cuff Size: Large adult)   Pulse 65   Temp 97.6 °F (36.4 °C) (Temporal)   Resp 16   Ht 5' (1.524 m)   Wt 120 lb (54.4 kg)   SpO2 97%   BMI 23.44 kg/m²        Physical Exam  Vitals reviewed. Constitutional:       General: She is awake. She is not in acute distress. Appearance: Normal appearance. She is well-groomed and normal weight. HENT:      Head: Normocephalic and atraumatic. Jaw: There is normal jaw occlusion. No trismus, tenderness or malocclusion. Salivary Glands: Right salivary gland is not diffusely enlarged or tender. Left salivary gland is not diffusely enlarged or tender. Comments: Dry ear canal     Right Ear: Hearing, tympanic membrane, ear canal and external ear normal. There is impacted cerumen. Left Ear: Hearing, tympanic membrane, ear canal and external ear normal. There is impacted cerumen. Ears:      Piña exam findings: does not lateralize. Right Rinne: AC > BC. Left Rinne: AC > BC. Comments:        Nose: Septal deviation (caudal to right, posterior to left) present. No nasal deformity or mucosal edema. Right Nostril: No epistaxis. Left Nostril: No epistaxis. Right Turbinates: Not enlarged, swollen or pale. Left Turbinates: Not enlarged, swollen or pale. Right Sinus: No maxillary sinus tenderness or frontal sinus tenderness. Left Sinus: No maxillary sinus tenderness or frontal sinus tenderness. Comments:  Well healed right nasal flap  Right caudal septum deviation, collapse of right anterior nasal valve       Mouth/Throat:      Lips: Pink. No lesions. Mouth: Mucous membranes are moist. No oral lesions. Dentition: Normal dentition. No dental caries. Tongue: No lesions. Palate: No mass and lesions. Pharynx: Oropharynx is clear. Uvula midline. No oropharyngeal exudate or posterior oropharyngeal erythema. Tonsils: No tonsillar exudate. 0 on the right. 0 on the left. Eyes:      General: Vision grossly intact. Extraocular Movements: Extraocular movements intact. Right eye: No nystagmus. Left eye: No nystagmus. Conjunctiva/sclera: Conjunctivae normal.      Pupils: Pupils are equal, round, and reactive to light. Neck:      Thyroid: No thyroid mass, thyromegaly or thyroid tenderness. Trachea: Trachea and phonation normal. No tracheal tenderness or tracheal deviation. Cardiovascular:      Rate and Rhythm: Normal rate and regular rhythm. Pulmonary:      Effort: Pulmonary effort is normal. No respiratory distress. Breath sounds: No stridor. Musculoskeletal:         General: No swelling or tenderness. Normal range of motion. Cervical back: No edema or erythema. Lymphadenopathy:      Cervical: No cervical adenopathy. Skin:     General: Skin is warm and dry. Findings: No lesion or rash. Neurological:      General: No focal deficit present. Mental Status: She is alert and oriented to person, place, and time. Mental status is at baseline. Cranial Nerves: Cranial nerves are intact. Coordination: Romberg sign negative. Gait: Gait is intact. Psychiatric:         Mood and Affect: Mood normal.         Behavior: Behavior normal. Behavior is cooperative. Procedure - Removal Impacted Cerumen    Indications: cerumen impaction    Ears are examined under microscope/headlight.   Bilateral ears are cleaned using otologic curette, suction, and/or alligator forceps. Assessment/Plan:     Encounter Diagnoses   Name Primary?  Obstruction of nasal valve Yes    Deviated nasal septum     Bilateral impacted cerumen      Pt does have nasal valve collapse on right - would need surgery to address, which she agrees is not necessary  Cont left sided nasal moisturization with Ayr gel twice daily  External nose is healthy, patient reassured  Bilateral ear cleaned  Fu 6 mos    Orders Placed This Encounter    REMOVE IMPACTED EAR WAX     Follow-up and Dispositions    · Return in about 6 months (around 8/2/2022).

## 2022-02-02 NOTE — PROGRESS NOTES
Visit Vitals  /60 (BP 1 Location: Left upper arm, BP Patient Position: Sitting, BP Cuff Size: Large adult)   Pulse 65   Temp 97.6 °F (36.4 °C) (Temporal)   Resp 16   Ht 5' (1.524 m)   Wt 120 lb (54.4 kg)   SpO2 97%   BMI 23.44 kg/m²     Chief Complaint   Patient presents with    Follow-up     F/U nose and ears

## 2022-03-18 PROBLEM — J31.0 CHRONIC RHINITIS: Status: ACTIVE | Noted: 2020-07-31

## 2022-03-18 PROBLEM — M81.0 OSTEOPOROSIS: Status: ACTIVE | Noted: 2019-03-19

## 2022-03-19 PROBLEM — E11.9 DIABETES MELLITUS (HCC): Status: ACTIVE | Noted: 2019-02-18

## 2022-03-19 PROBLEM — J34.2 DEVIATED NASAL SEPTUM: Status: ACTIVE | Noted: 2020-07-31

## 2022-03-19 PROBLEM — M77.40 METATARSALGIA: Status: ACTIVE | Noted: 2018-10-02

## 2022-03-19 PROBLEM — H61.21 IMPACTED CERUMEN OF RIGHT EAR: Status: ACTIVE | Noted: 2021-05-24

## 2022-03-19 PROBLEM — J34.89 OBSTRUCTION OF NASAL VALVE: Status: ACTIVE | Noted: 2020-07-31

## 2022-03-19 PROBLEM — L03.119 CELLULITIS OF FOOT: Status: ACTIVE | Noted: 2018-03-27

## 2022-03-19 PROBLEM — R04.0 EPISTAXIS: Status: ACTIVE | Noted: 2020-07-31

## 2022-03-19 PROBLEM — L85.1 ACQUIRED KERATODERMA: Status: ACTIVE | Noted: 2019-02-18

## 2022-03-19 PROBLEM — S32.9XXA FRACTURE OF PELVIS (HCC): Status: ACTIVE | Noted: 2017-06-20

## 2022-04-26 ENCOUNTER — HOSPITAL ENCOUNTER (EMERGENCY)
Age: 87
Discharge: HOME | DRG: 522 | End: 2022-04-26
Attending: FAMILY MEDICINE
Payer: MEDICARE

## 2022-04-26 ENCOUNTER — APPOINTMENT (OUTPATIENT)
Dept: CT IMAGING | Age: 87
DRG: 522 | End: 2022-04-26
Attending: FAMILY MEDICINE
Payer: MEDICARE

## 2022-04-26 ENCOUNTER — APPOINTMENT (OUTPATIENT)
Dept: GENERAL RADIOLOGY | Age: 87
DRG: 522 | End: 2022-04-26
Attending: FAMILY MEDICINE
Payer: MEDICARE

## 2022-04-26 VITALS
BODY MASS INDEX: 24.54 KG/M2 | HEART RATE: 76 BPM | OXYGEN SATURATION: 99 % | SYSTOLIC BLOOD PRESSURE: 106 MMHG | WEIGHT: 125 LBS | HEIGHT: 60 IN | DIASTOLIC BLOOD PRESSURE: 51 MMHG | RESPIRATION RATE: 18 BRPM | TEMPERATURE: 98.3 F

## 2022-04-26 DIAGNOSIS — M25.551 RIGHT HIP PAIN: Primary | ICD-10-CM

## 2022-04-26 PROCEDURE — 73502 X-RAY EXAM HIP UNI 2-3 VIEWS: CPT

## 2022-04-26 PROCEDURE — 73700 CT LOWER EXTREMITY W/O DYE: CPT

## 2022-04-26 PROCEDURE — 99284 EMERGENCY DEPT VISIT MOD MDM: CPT

## 2022-04-26 RX ORDER — ACETAMINOPHEN 325 MG/1
650 TABLET ORAL ONCE
Status: DISCONTINUED | OUTPATIENT
Start: 2022-04-26 | End: 2022-04-26 | Stop reason: HOSPADM

## 2022-04-26 NOTE — ED PROVIDER NOTES
EMERGENCY DEPARTMENT HISTORY AND PHYSICAL EXAM      Date: 4/26/2022  Patient Name: Marilu Hebert    History of Presenting Illness     Chief Complaint   Patient presents with    Hip Pain       History Provided By:     HPI: Marilu Hebert, is an extremely pleasant 80 y.o. female presenting to the ED with a chief complaint of hip pain. Patient states the pain in her right hip started yesterday afternoon after walking. She states it is achy in the outside of her hip. No numbness tingling or weakness in this extremity. No falls no injuries. Patient does endorse history of prior hip fracture years ago. There are no other complaints, changes, or physical findings at this time. PCP: Katherine Mondragon MD    No current facility-administered medications on file prior to encounter. Current Outpatient Medications on File Prior to Encounter   Medication Sig Dispense Refill    medical supply, miscellaneous (OCUSOFT LID SCRUB) by Does Not Apply route. As needed      ketoconazole (NIZORAL) 2 % topical cream Apply  to affected area daily. (Patient not taking: Reported on 12/21/2021)      diclofenac (VOLTAREN) 1 % gel Apply  to affected area as needed for Pain. (Patient not taking: Reported on 12/21/2021)      losartan-hydroCHLOROthiazide (HYZAAR) 50-12.5 mg per tablet Take 1 Tab by mouth daily.  acetaminophen (Tylenol Extra Strength) 500 mg tablet Take 500 mg by mouth five (5) times daily.  amLODIPine (NORVASC) 10 mg tablet Take 10 mg by mouth daily.  aspirin delayed-release 81 mg tablet Take 81 mg by mouth daily.  atorvastatin (LIPITOR) 20 mg tablet Take 20 mg by mouth daily.  sodium chloride-aloe vera ([place] Saline) topical gel Apply  to affected area once. (Patient not taking: Reported on 12/21/2021)      calcium-cholecalciferol, D3, (CALTRATE 600+D) tablet Take 1 Tab by mouth daily.  docusate sodium (Colace) 100 mg capsule Take 100 mg by mouth as needed.  (Patient not taking: Reported on 12/21/2021)      esomeprazole (NEXIUM) 40 mg capsule Take  by mouth daily.  SITagliptin (Januvia) 50 mg tablet Take 50 mg by mouth daily.  valsartan-hydroCHLOROthiazide (DIOVAN-HCT) 160-12.5 mg per tablet Take 1 Tab by mouth daily. (Patient not taking: Reported on 12/13/2021)      pregabalin (LYRICA) 100 mg capsule Take  by mouth two (2) times a day.  metFORMIN (GLUCOPHAGE) 500 mg tablet Take 500 mg by mouth two (2) times daily (with meals).  estradioL (ESTRACE) 0.01 % (0.1 mg/gram) vaginal cream Insert 2 g into vagina. Twice a week         Past History     Past Medical History:  Past Medical History:   Diagnosis Date    DM [facility]diabetes mellitus) ([facility] [address]     High cholesterol     History of multiple allergies     HTN (hypertension)     Hypertension     Osteoporosis     Type 2 diabetes mellitus (HCC)        Past Surgical History:  Past Surgical History:   Procedure Laterality Date    HX APPENDECTOMY      HX HIP ARTHROSCOPY      HX HIP REPLACEMENT      HX HYSTERECTOMY      HX KNEE ARTHROSCOPY         Family History:  Family History   Problem Relation Age of Onset    Diabetes Mother     Hypertension Mother     Osteoporosis Sister        Social History:  Social History     Tobacco Use    Smoking status: Never Smoker    Smokeless tobacco: Never Used   Substance Use Topics    Alcohol use: Never    Drug use: Never       Allergies:  No Known Allergies      Review of Systems     Review of Systems   Constitutional: Negative for activity change, appetite change, chills, fatigue and fever. HENT: Negative for congestion and sore throat. Eyes: Negative for photophobia and visual disturbance. Respiratory: Negative for cough, shortness of breath and wheezing. Cardiovascular: Negative for chest pain, palpitations and leg swelling. Gastrointestinal: Negative for abdominal pain, diarrhea, nausea and vomiting. Endocrine: Negative for cold intolerance and heat intolerance. Musculoskeletal: Negative for gait problem and joint swelling. Hip pain   Skin: Negative for color change and rash. Neurological: Negative for dizziness and headaches. Physical Exam     Physical Exam  Constitutional:       Appearance: She is well-developed. HENT:      Head: Normocephalic and atraumatic. Mouth/Throat:      Mouth: Mucous membranes are moist.      Pharynx: Oropharynx is clear. Eyes:      Conjunctiva/sclera: Conjunctivae normal.      Pupils: Pupils are equal, round, and reactive to light. Cardiovascular:      Rate and Rhythm: Normal rate and regular rhythm. Heart sounds: No murmur heard. Pulmonary:      Effort: No respiratory distress. Breath sounds: No stridor. No wheezing, rhonchi or rales. Abdominal:      General: There is no distension. Tenderness: There is no abdominal tenderness. There is no rebound. Musculoskeletal:      Cervical back: Normal range of motion and neck supple. Comments: Tenderness to palpation of the right greater trochanter. Pelvis is stable. No leg length discrepancy. No internal nor external rotation of extremities. Patient is ambulatory. Skin:     General: Skin is warm and dry. Neurological:      General: No focal deficit present. Mental Status: She is alert and oriented to person, place, and time. Psychiatric:         Mood and Affect: Mood normal.         Behavior: Behavior normal.         Lab and Diagnostic Study Results     Labs -   No results found for this or any previous visit (from the past 12 hour(s)). Radiologic Studies -   @lastxrresult@  CT Results  (Last 48 hours)               04/26/22 1054  CT HIP RT WO CONT Final result    Impression:  1. No right femoral neck fracture is identified. If symptoms persist and there   is concern for an occult fracture, MRI would be the most sensitive exam.   2. Grossly intact left hip arthroplasty.    3. Healed fractures of the right superior and inferior pubic rami. 4. Incidental findings as above. Narrative:  Right lower extremity CT without contrast.       Comparison: Radiographs obtained earlier the same day. Technique: CT images of the pelvis and right hip are performed without contrast   and reformatted in multiple planes for review. 3D volume rendered images were   also performed. Dose Reduction: All CT scans at this facility are performed using dose reduction   optimization techniques as appropriate to a performed exam including the   following: Automated exposure control, adjustments of the mA and/or kV according   to patient size, or use of iterative reconstruction technique. Findings: There is no evidence of acute fracture or dislocation. The suspicious   vertical lucency on the prior radiographs is not appreciated by CT. There is   mild-to-moderate joint space narrowing and osteophyte formation within the right   hip. There is no evidence of AVN within the right femoral head. There are healed fractures of the right superior and inferior pubic rami with   mild associated deformity. There is a left hip arthroplasty in place. The distal   aspect of the femoral stem is not included in the field-of-view. The visualized   hardware is intact without evidence of loosening. There are severe degenerative   changes in the lower lumbar spine. 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 The SI joints and pubic symphysis are normally   aligned. There are moderate atherosclerotic calcifications. 2 large renal cysts are noted   at the lower pole of the left kidney measuring up to 7.6 cm. The visualized   small and large bowel are normal in caliber. Multiple colonic diverticula are   noted. A pessary device is noted along the vaginal cuff. There is no acute   process within the pelvis.                CXR Results  (Last 48 hours)    None            Medical Decision Making   - I am the first provider for this patient. - I reviewed the vital signs, available nursing notes, past medical history, past surgical history, family history and social history. - Initial assessment performed. The patients presenting problems have been discussed, and they are in agreement with the care plan formulated and outlined with them. I have encouraged them to ask questions as they arise throughout their visit. Vital Signs-Reviewed the patient's vital signs. Patient Vitals for the past 12 hrs:   Temp Pulse Resp BP SpO2   04/26/22 0930 98.3 °F (36.8 °C) 76 18 (!) 106/51 99 %         ED Course/ Provider Notes (Medical Decision Making):     Patient presented to the emergency department with a chief complaint of right hip pain. On examination the patient is nontoxic and well-appearing. Vitals were reviewed per above. X-ray concerning for possible subtle fracture. CT subsequently ordered with no acute fracture or dislocation. Patient relieved. Declines any pain medication. Information to follow-up with orthopedics    Radha Alberto was given a thorough list of signs and symptoms that would warrant an immediate return to the emergency department. Otherwise Radha Alberto will follow up with PCP. Procedures   Medical Decision Makingedical Decision Making  Performed by: Kim Phelps DO  Procedures  None       Disposition   Disposition:     Home     All of the diagnostic tests were reviewed and questions answered. Diagnosis, care plan and treatment options were discussed. The patient understands the instructions and will follow up as directed. The patients results have been reviewed with them. They have been counseled regarding their diagnosis. The patient verbally convey understanding and agreement of the signs, symptoms, diagnosis, treatment and prognosis and additionally agrees to follow up as recommended with their PCP in 24 - 48 hours.   They also agree with the care-plan and convey that all of their questions have been answered. I have also put together some discharge instructions for them that include: 1) educational information regarding their diagnosis, 2) how to care for their diagnosis at home, as well a 3) list of reasons why they would want to return to the ED prior to their follow-up appointment, should their condition change. DISCHARGE PLAN:    1. Cannot display discharge medications since this patient is not currently admitted. 2.   Follow-up Information     Follow up With Specialties Details Why [id number] [facility]  Call   [address]  [address]  [address]  [phone]            3. Return to ED if worse       4. Discharge Medication List as of 4/26/2022 11:44 AM            Diagnosis     Clinical Impression:    1. Right hip pain        Attestations:    Kim Phelps, DO    Please note that this dictation was completed with [employer], the computer voice recognition software. Quite often unanticipated grammatical, syntax, homophones, and other interpretive errors are inadvertently transcribed by the computer software. Please disregard these errors. Please excuse any errors that have escaped final proofreading. Thank you.

## 2022-04-26 NOTE — ED TRIAGE NOTES
Pt reports that yesterday afternoon while walking she began to have pain in her R hip. She reports that she is having difficulty ambulating today due to the pain.

## 2022-04-26 NOTE — DISCHARGE INSTRUCTIONS
Thank you! Thank you for allowing me to care for you in the emergency department. I sincerely hope that you are satisfied with your visit today. It is my goal to provide you with excellent care. Below you will find a list of your labs and imaging from your visit today. Should you have any questions regarding these results please do not hesitate to call the emergency department. Labs -   No results found for this or any previous visit (from the past 12 hour(s)). Radiologic Studies -   CT HIP RT WO CONT   Final Result   1. No right femoral neck fracture is identified. If symptoms persist and there   is concern for an occult fracture, MRI would be the most sensitive exam.   2. Grossly intact left hip arthroplasty. 3. Healed fractures of the right superior and inferior pubic rami. 4. Incidental findings as above. XR HIP RT W OR WO PELV 2-3 VWS   Final Result        CT Results  (Last 48 hours)                 04/26/22 1054  CT HIP RT WO CONT Final result    Impression:  1. No right femoral neck fracture is identified. If symptoms persist and there   is concern for an occult fracture, MRI would be the most sensitive exam.   2. Grossly intact left hip arthroplasty. 3. Healed fractures of the right superior and inferior pubic rami. 4. Incidental findings as above. Narrative:  Right lower extremity CT without contrast.       Comparison: Radiographs obtained earlier the same day. Technique: CT images of the pelvis and right hip are performed without contrast   and reformatted in multiple planes for review. 3D volume rendered images were   also performed. Dose Reduction: All CT scans at this facility are performed using dose reduction   optimization techniques as appropriate to a performed exam including the   following: Automated exposure control, adjustments of the mA and/or kV according   to patient size, or use of iterative reconstruction technique. Findings:  There is no evidence of acute fracture or dislocation. The suspicious   vertical lucency on the prior radiographs is not appreciated by CT. There is   mild-to-moderate joint space narrowing and osteophyte formation within the right   hip. There is no evidence of AVN within the right femoral head. There are healed fractures of the right superior and inferior pubic rami with   mild associated deformity. There is a left hip arthroplasty in place. The distal   aspect of the femoral stem is not included in the field-of-view. The visualized   hardware is intact without evidence of loosening. There are severe degenerative   changes in the lower lumbar spine. 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 The SI joints and pubic symphysis are normally   aligned. There are moderate atherosclerotic calcifications. 2 large renal cysts are noted   at the lower pole of the left kidney measuring up to 7.6 cm. The visualized   small and large bowel are normal in caliber. Multiple colonic diverticula are   noted. A pessary device is noted along the vaginal cuff. There is no acute   process within the pelvis. CXR Results  (Last 48 hours)      None               If you feel that you have not received excellent quality care or timely care, please ask to speak to the nurse manager. Please choose us in the future for your continued health care needs. ------------------------------------------------------------------------------------------------------------  The exam and treatment you received in the Emergency Department were for an urgent problem and are not intended as complete care. It is important that you follow-up with a doctor, nurse practitioner, or physician assistant to:  (1) confirm your diagnosis,  (2) re-evaluation of changes in your illness and treatment, and  (3) for ongoing care.   If your symptoms become worse or you do not improve as expected and you are unable to reach your usual health care provider, you should return to the Emergency Department. We are available 24 hours a day. Please take your discharge instructions with you when you go to your follow-up appointment. If you have any problem arranging a follow-up appointment, contact the Emergency Department immediately. If a prescription has been provided, please have it filled as soon as possible to prevent a delay in treatment. Read the entire medication instruction sheet provided to you by the pharmacy. If you have any questions or reservations about taking the medication due to side effects or interactions with other medications, please call your primary care physician or contact the ER to speak with the charge nurse. Make an appointment with your family doctor or the physician you were referred to for follow-up of this visit as instructed on your discharge paperwork, as this is a mandatory follow-up. Return to the ER if you are unable to be seen or if you are unable to be seen in a timely manner. If you have any problem arranging the follow-up visit, contact the Emergency Department immediately.

## 2022-04-28 ENCOUNTER — APPOINTMENT (OUTPATIENT)
Dept: CT IMAGING | Age: 87
DRG: 522 | End: 2022-04-28
Attending: EMERGENCY MEDICINE
Payer: MEDICARE

## 2022-04-28 ENCOUNTER — HOSPITAL ENCOUNTER (INPATIENT)
Age: 87
LOS: 6 days | Discharge: NURSING, REHAB OR LONG-TERM CARE | DRG: 522 | End: 2022-05-04
Attending: EMERGENCY MEDICINE | Admitting: INTERNAL MEDICINE
Payer: MEDICARE

## 2022-04-28 ENCOUNTER — APPOINTMENT (OUTPATIENT)
Dept: GENERAL RADIOLOGY | Age: 87
DRG: 522 | End: 2022-04-28
Attending: EMERGENCY MEDICINE
Payer: MEDICARE

## 2022-04-28 ENCOUNTER — ANESTHESIA EVENT (OUTPATIENT)
Dept: SURGERY | Age: 87
DRG: 522 | End: 2022-04-28
Payer: MEDICARE

## 2022-04-28 ENCOUNTER — APPOINTMENT (OUTPATIENT)
Dept: GENERAL RADIOLOGY | Age: 87
DRG: 522 | End: 2022-04-28
Attending: ORTHOPAEDIC SURGERY
Payer: MEDICARE

## 2022-04-28 DIAGNOSIS — S72.001A CLOSED FRACTURE OF RIGHT HIP, INITIAL ENCOUNTER (HCC): Primary | ICD-10-CM

## 2022-04-28 PROBLEM — S72.009A HIP FRACTURE (HCC): Status: ACTIVE | Noted: 2022-04-28

## 2022-04-28 LAB
ABO + RH BLD: NORMAL
ALBUMIN SERPL-MCNC: 3.4 G/DL (ref 3.5–5)
ALBUMIN/GLOB SERPL: 0.9 {RATIO} (ref 1.1–2.2)
ALP SERPL-CCNC: 62 U/L (ref 45–117)
ALT SERPL-CCNC: 21 U/L (ref 12–78)
ANION GAP SERPL CALC-SCNC: 6 MMOL/L (ref 5–15)
AST SERPL W P-5'-P-CCNC: 14 U/L (ref 15–37)
BASOPHILS # BLD: 0 K/UL (ref 0–0.1)
BASOPHILS NFR BLD: 0 % (ref 0–1)
BILIRUB SERPL-MCNC: 0.3 MG/DL (ref 0.2–1)
BLOOD GROUP ANTIBODIES SERPL: NEGATIVE
BUN SERPL-MCNC: 27 MG/DL (ref 6–20)
BUN/CREAT SERPL: 30 (ref 12–20)
CA-I BLD-MCNC: 9.4 MG/DL (ref 8.5–10.1)
CHLORIDE SERPL-SCNC: 102 MMOL/L (ref 97–108)
CO2 SERPL-SCNC: 29 MMOL/L (ref 21–32)
CREAT SERPL-MCNC: 0.89 MG/DL (ref 0.55–1.02)
DIFFERENTIAL METHOD BLD: ABNORMAL
EOSINOPHIL # BLD: 0 K/UL (ref 0–0.4)
EOSINOPHIL NFR BLD: 0 % (ref 0–7)
ERYTHROCYTE [DISTWIDTH] IN BLOOD BY AUTOMATED COUNT: 13.9 % (ref 11.5–14.5)
GLOBULIN SER CALC-MCNC: 3.9 G/DL (ref 2–4)
GLUCOSE SERPL-MCNC: 254 MG/DL (ref 65–100)
HCT VFR BLD AUTO: 38.8 % (ref 35–47)
HGB BLD-MCNC: 12.6 G/DL (ref 11.5–16)
IMM GRANULOCYTES # BLD AUTO: 0 K/UL (ref 0–0.04)
IMM GRANULOCYTES NFR BLD AUTO: 0 % (ref 0–0.5)
INR PPP: 0.9 (ref 0.9–1.1)
LYMPHOCYTES # BLD: 0.9 K/UL (ref 0.8–3.5)
LYMPHOCYTES NFR BLD: 11 % (ref 12–49)
MCH RBC QN AUTO: 29.6 PG (ref 26–34)
MCHC RBC AUTO-ENTMCNC: 32.5 G/DL (ref 30–36.5)
MCV RBC AUTO: 91.3 FL (ref 80–99)
MONOCYTES # BLD: 0.6 K/UL (ref 0–1)
MONOCYTES NFR BLD: 7 % (ref 5–13)
NEUTS SEG # BLD: 6.4 K/UL (ref 1.8–8)
NEUTS SEG NFR BLD: 82 % (ref 32–75)
NRBC # BLD: 0 K/UL (ref 0–0.01)
NRBC BLD-RTO: 0 PER 100 WBC
PLATELET # BLD AUTO: 196 K/UL (ref 150–400)
PMV BLD AUTO: 11.7 FL (ref 8.9–12.9)
POTASSIUM SERPL-SCNC: 3.7 MMOL/L (ref 3.5–5.1)
PROT SERPL-MCNC: 7.3 G/DL (ref 6.4–8.2)
PROTHROMBIN TIME: 12.2 SEC (ref 11.9–14.6)
RBC # BLD AUTO: 4.25 M/UL (ref 3.8–5.2)
SODIUM SERPL-SCNC: 137 MMOL/L (ref 136–145)
SPECIMEN EXP DATE BLD: NORMAL
WBC # BLD AUTO: 7.9 K/UL (ref 3.6–11)

## 2022-04-28 PROCEDURE — 72192 CT PELVIS W/O DYE: CPT

## 2022-04-28 PROCEDURE — 74011250636 HC RX REV CODE- 250/636: Performed by: EMERGENCY MEDICINE

## 2022-04-28 PROCEDURE — 85025 COMPLETE CBC W/AUTO DIFF WBC: CPT

## 2022-04-28 PROCEDURE — 65270000029 HC RM PRIVATE

## 2022-04-28 PROCEDURE — 99285 EMERGENCY DEPT VISIT HI MDM: CPT

## 2022-04-28 PROCEDURE — 85610 PROTHROMBIN TIME: CPT

## 2022-04-28 PROCEDURE — 36415 COLL VENOUS BLD VENIPUNCTURE: CPT

## 2022-04-28 PROCEDURE — 74011250637 HC RX REV CODE- 250/637: Performed by: EMERGENCY MEDICINE

## 2022-04-28 PROCEDURE — 71045 X-RAY EXAM CHEST 1 VIEW: CPT

## 2022-04-28 PROCEDURE — 80053 COMPREHEN METABOLIC PANEL: CPT

## 2022-04-28 PROCEDURE — 73552 X-RAY EXAM OF FEMUR 2/>: CPT

## 2022-04-28 PROCEDURE — 86900 BLOOD TYPING SEROLOGIC ABO: CPT

## 2022-04-28 PROCEDURE — 93005 ELECTROCARDIOGRAM TRACING: CPT

## 2022-04-28 RX ORDER — TRAMADOL HYDROCHLORIDE 50 MG/1
50 TABLET ORAL
Status: ACTIVE | OUTPATIENT
Start: 2022-04-28 | End: 2022-04-29

## 2022-04-28 RX ORDER — SODIUM CHLORIDE 9 MG/ML
75 INJECTION, SOLUTION INTRAVENOUS CONTINUOUS
Status: DISCONTINUED | OUTPATIENT
Start: 2022-04-28 | End: 2022-05-02

## 2022-04-28 RX ORDER — ONDANSETRON 2 MG/ML
4 INJECTION INTRAMUSCULAR; INTRAVENOUS
Status: ACTIVE | OUTPATIENT
Start: 2022-04-28 | End: 2022-04-29

## 2022-04-28 RX ORDER — NAPROXEN 500 MG/1
500 TABLET ORAL
Status: COMPLETED | OUTPATIENT
Start: 2022-04-28 | End: 2022-04-28

## 2022-04-28 RX ADMIN — SODIUM CHLORIDE 75 ML/HR: 9 INJECTION, SOLUTION INTRAVENOUS at 19:33

## 2022-04-28 RX ADMIN — NAPROXEN 500 MG: 500 TABLET ORAL at 19:16

## 2022-04-28 NOTE — ED TRIAGE NOTES
EMS dispatched to [address] for person with continued sciatica type pain. . ( right hip that radiates into her knee which makes the ext lock up and cause extreme pain.) Was seen at urgent care for the same a few days ago.

## 2022-04-28 NOTE — ED PROVIDER NOTES
EMERGENCY DEPARTMENT HISTORY AND PHYSICAL EXAM      Date: 4/28/2022  Patient Name: Christa Petersen    History of Presenting Illness     Chief Complaint   Patient presents with    Hip Pain       History Provided By: Patient    HPI: Christa Petersen, 80 y.o. female with a past medical history significant diabetes, hypertension and Osteoporosis presents to the ED with cc of right hip pain. She says she was seen and evaluated the other day and told she did not have a fracture but continues to have pain in the right hip she denies any injury says when she bends her legs it hurts worse. She says it radiates to her knee and it is sharp. She has had a left hip arthroplasty before she said prior pubic rami fractures also. There are no other complaints, changes, or physical findings at this time. PCP: Mary Valentino MD    No current facility-administered medications on file prior to encounter. Current Outpatient Medications on File Prior to Encounter   Medication Sig Dispense Refill    medical supply, miscellaneous (OCUSOFT LID SCRUB) by Does Not Apply route. As needed      ketoconazole (NIZORAL) 2 % topical cream Apply  to affected area daily. (Patient not taking: Reported on 12/21/2021)      diclofenac (VOLTAREN) 1 % gel Apply  to affected area as needed for Pain. (Patient not taking: Reported on 12/21/2021)      losartan-hydroCHLOROthiazide (HYZAAR) 50-12.5 mg per tablet Take 1 Tab by mouth daily.  acetaminophen (Tylenol Extra Strength) 500 mg tablet Take 500 mg by mouth five (5) times daily.  amLODIPine (NORVASC) 10 mg tablet Take 10 mg by mouth daily.  aspirin delayed-release 81 mg tablet Take 81 mg by mouth daily.  atorvastatin (LIPITOR) 20 mg tablet Take 20 mg by mouth daily.  sodium chloride-aloe vera ([place] Saline) topical gel Apply  to affected area once.  (Patient not taking: Reported on 12/21/2021)      calcium-cholecalciferol, D3, (CALTRATE 600+D) tablet Take 1 Tab by mouth daily.      docusate sodium (Colace) 100 mg capsule Take 100 mg by mouth as needed. (Patient not taking: Reported on 12/21/2021)      esomeprazole (NEXIUM) 40 mg capsule Take  by mouth daily.  SITagliptin (Januvia) 50 mg tablet Take 50 mg by mouth daily.  valsartan-hydroCHLOROthiazide (DIOVAN-HCT) 160-12.5 mg per tablet Take 1 Tab by mouth daily. (Patient not taking: Reported on 12/13/2021)      pregabalin (LYRICA) 100 mg capsule Take  by mouth two (2) times a day.  metFORMIN (GLUCOPHAGE) 500 mg tablet Take 500 mg by mouth two (2) times daily (with meals).  estradioL (ESTRACE) 0.01 % (0.1 mg/gram) vaginal cream Insert 2 g into vagina. Twice a week         Past History     Past Medical History:  Past Medical History:   Diagnosis Date    DM [facility]diabetes mellitus) ([facility] [address]     High cholesterol     History of multiple allergies     HTN (hypertension)     Hypertension     Osteoporosis     Type 2 diabetes mellitus (HCC)        Past Surgical History:  Past Surgical History:   Procedure Laterality Date    HX APPENDECTOMY      HX HIP ARTHROSCOPY      HX HIP REPLACEMENT      HX HYSTERECTOMY      HX KNEE ARTHROSCOPY         Family History:  Family History   Problem Relation Age of Onset    Diabetes Mother     Hypertension Mother     Osteoporosis Sister        Social History:  Social History     Tobacco Use    Smoking status: Never Smoker    Smokeless tobacco: Never Used   Substance Use Topics    Alcohol use: Never    Drug use: Never       Allergies:  No Known Allergies      Review of Systems     Review of Systems   Constitutional: Negative. Negative for appetite change, chills, fatigue and fever. HENT: Negative. Negative for congestion and sinus pain. Eyes: Negative. Negative for pain and visual disturbance. Respiratory: Negative. Negative for chest tightness and shortness of breath. Cardiovascular: Negative. Negative for chest pain. Gastrointestinal: Negative.   Negative for abdominal pain, diarrhea, nausea and vomiting. Genitourinary: Negative. Negative for difficulty urinating. No discharge   Musculoskeletal: Positive for arthralgias and gait problem. Skin: Negative. Negative for rash. Neurological: Negative for weakness and headaches. Hematological: Negative. Psychiatric/Behavioral: Negative. Negative for agitation. The patient is not nervous/anxious. All other systems reviewed and are negative. Physical Exam     Physical Exam  Vitals and nursing note reviewed. Constitutional:       General: She is not in acute distress. Appearance: She is well-developed. HENT:      Head: Normocephalic and atraumatic. Nose: Nose normal.      Mouth/Throat:      Mouth: Mucous membranes are moist.      Pharynx: Oropharynx is clear. No oropharyngeal exudate. Eyes:      General:         Right eye: No discharge. Left eye: No discharge. Conjunctiva/sclera: Conjunctivae normal.      Pupils: Pupils are equal, round, and reactive to light. Cardiovascular:      Rate and Rhythm: Normal rate and regular rhythm. Chest Wall: PMI is not displaced. No thrill. Heart sounds: Normal heart sounds. No murmur heard. No friction rub. No gallop. Pulmonary:      Effort: Pulmonary effort is normal. No respiratory distress. Breath sounds: Normal breath sounds. No wheezing or rales. Chest:      Chest wall: No tenderness. Abdominal:      General: Bowel sounds are normal. There is no distension. Palpations: Abdomen is soft. There is no mass. Tenderness: There is no abdominal tenderness. There is no guarding or rebound. Musculoskeletal:         General: Tenderness present. Normal range of motion. Cervical back: Normal range of motion and neck supple. Comments: Right leg is shortened and externally rotated   Lymphadenopathy:      Cervical: No cervical adenopathy. Skin:     General: Skin is warm and dry.       Capillary Refill: Capillary refill takes less than 2 seconds. Findings: No erythema or rash. Neurological:      Mental Status: She is alert and oriented to person, place, and time. Cranial Nerves: No cranial nerve deficit. Coordination: Coordination normal.   Psychiatric:         Mood and Affect: Mood normal.         Behavior: Behavior normal.         Lab and Diagnostic Study Results     Labs -   No results found for this or any previous visit (from the past 12 hour(s)). Radiologic Studies -   @lastxrresult@  CT Results  (Last 48 hours)               04/28/22 1755  CT PELV WO CONT Final result    Impression:  1. There is an acute displaced mid (transcervical) fracture of the right   femoral neck. 2.  There are older fractures involving the right superior and ischiopubic rami. 3.  The patient status post prior placement of a left bipolar hip prosthesis. Please see the above text for further details. Narrative: This study is a CT evaluation of the pelvis dated 4/28/2022. HISTORY: Trauma. TECHNIQUE: 3 mm axial imaging was acquired through the pelvis. From the axial   imaging sequence sagittal and coronal reconstructed imaging is submitted for   interpretation. Dose Reduction Technique was employed to reduce radiation exposure - This   includes reduction optimization techniques as appropriate to a performed exam   with automated exposure control adjustments of the mA and/or Kv according to   patient size, or use of iterative reconstruction technique. FINDINGS: There is an acute fracture through the right femoral neck. The   fracture is demonstrated within the transcervical region of the right femoral   neck. This represents a displaced transcervical femoral neck fracture with   anterior angulation of the apex of the fracture line within the femoral neck and   approximately one half shaft width anterior displacement of the distal femoral   fracture fragment.        The patient is status post prior placement of a left hip prosthesis. There are older fractures involving the right superior and ischiopubic rami. This examination is negative for acute pelvic fractures. There is degenerative disc disease demonstrated along the lower lumbosacral   spine as well as facet joint bony hypertrophy. Imaging with the soft tissue window algorithm is negative for a hematoma about   the pelvic region. There is a pessary device in place along the neck and floor   of the bladder. There are diverticula of the sigmoid colon without active   diverticulitis. There are 2 fluid density cysts projecting from the lower pole   of the left kidney. CXR Results  (Last 48 hours)    None            Medical Decision Making   - I am the first provider for this patient. - I reviewed the vital signs, available nursing notes, past medical history, past surgical history, family history and social history. - Initial assessment performed. The patients presenting problems have been discussed, and they are in agreement with the care plan formulated and outlined with them. I have encouraged them to ask questions as they arise throughout their visit. Vital Signs-Reviewed the patient's vital signs. Patient Vitals for the past 12 hrs:   Temp Pulse Resp BP SpO2   04/28/22 1640 98.8 °F (37.1 °C) 100 17 (!) 164/68 94 %     Patient appears to have an acute fracture of the right hip. We will be admitting to the hospital for further evaluation    ED Course:          Provider Notes (Medical Decision Making): MDM       Procedures   Medical Decision Makingedical Decision Making  Performed by: Marlo Brown MD  PROCEDURES:  Procedures       Disposition   Disposition: Condition stable    Admitted    Diagnosis     Clinical Impression:   1.  Closed fracture of right hip, initial encounter [facility])        Attestations:    Marlo Brown MD    Please note that this dictation was completed with Dragon, the computer voice recognition software. Quite often unanticipated grammatical, syntax, homophones, and other interpretive errors are inadvertently transcribed by the computer software. Please disregard these errors. Please excuse any errors that have escaped final proofreading. Thank you.

## 2022-04-29 ENCOUNTER — APPOINTMENT (OUTPATIENT)
Dept: GENERAL RADIOLOGY | Age: 87
DRG: 522 | End: 2022-04-29
Attending: ORTHOPAEDIC SURGERY
Payer: MEDICARE

## 2022-04-29 ENCOUNTER — APPOINTMENT (OUTPATIENT)
Dept: NON INVASIVE DIAGNOSTICS | Age: 87
DRG: 522 | End: 2022-04-29
Attending: INTERNAL MEDICINE
Payer: MEDICARE

## 2022-04-29 ENCOUNTER — ANESTHESIA (OUTPATIENT)
Dept: SURGERY | Age: 87
DRG: 522 | End: 2022-04-29
Payer: MEDICARE

## 2022-04-29 LAB
APPEARANCE UR: ABNORMAL
BACTERIA URNS QL MICRO: NEGATIVE /HPF
BILIRUB UR QL: NEGATIVE
COLOR UR: ABNORMAL
ECHO AO DESC DIAM: 1.5 CM
ECHO AO DESCENDING AORTA INDEX: 0.94 CM/M2
ECHO AO ROOT DIAM: 3 CM
ECHO AO ROOT INDEX: 1.88 CM/M2
ECHO AV AREA PEAK VELOCITY: 1.6 CM2
ECHO AV AREA VTI: 1.7 CM2
ECHO AV AREA/BSA PEAK VELOCITY: 1 CM2/M2
ECHO AV AREA/BSA VTI: 1.1 CM2/M2
ECHO AV MEAN GRADIENT: 8 MMHG
ECHO AV MEAN VELOCITY: 1.3 M/S
ECHO AV PEAK GRADIENT: 18 MMHG
ECHO AV PEAK VELOCITY: 2.1 M/S
ECHO AV VELOCITY RATIO: 0.67
ECHO AV VTI: 44.4 CM
ECHO EST RA PRESSURE: 8 MMHG
ECHO IVC PROX: 2.2 CM
ECHO LA AREA 4C: 20.3 CM2
ECHO LA DIAMETER INDEX: 1.75 CM/M2
ECHO LA DIAMETER: 2.8 CM
ECHO LA MAJOR AXIS: 6.4 CM
ECHO LA TO AORTIC ROOT RATIO: 0.93
ECHO LV E' LATERAL VELOCITY: 7 CM/S
ECHO LV E' SEPTAL VELOCITY: 4 CM/S
ECHO LV EJECTION FRACTION BIPLANE: 70 % (ref 55–100)
ECHO LV FRACTIONAL SHORTENING: 39 % (ref 28–44)
ECHO LV INTERNAL DIMENSION DIASTOLE INDEX: 2.06 CM/M2
ECHO LV INTERNAL DIMENSION DIASTOLIC: 3.3 CM (ref 3.9–5.3)
ECHO LV INTERNAL DIMENSION SYSTOLIC INDEX: 1.25 CM/M2
ECHO LV INTERNAL DIMENSION SYSTOLIC: 2 CM
ECHO LV IVSD: 1.2 CM (ref 0.6–0.9)
ECHO LV MASS 2D: 116.8 G (ref 67–162)
ECHO LV MASS INDEX 2D: 73 G/M2 (ref 43–95)
ECHO LV POSTERIOR WALL DIASTOLIC: 1.1 CM (ref 0.6–0.9)
ECHO LV RELATIVE WALL THICKNESS RATIO: 0.67
ECHO LVOT AREA: 2.5 CM2
ECHO LVOT AV VTI INDEX: 0.68
ECHO LVOT DIAM: 1.8 CM
ECHO LVOT MEAN GRADIENT: 3 MMHG
ECHO LVOT PEAK GRADIENT: 7 MMHG
ECHO LVOT PEAK VELOCITY: 1.4 M/S
ECHO LVOT STROKE VOLUME INDEX: 47.8 ML/M2
ECHO LVOT SV: 76.6 ML
ECHO LVOT VTI: 30.1 CM
ECHO MV A VELOCITY: 1.07 M/S
ECHO MV AREA VTI: 2.5 CM2
ECHO MV E DECELERATION TIME (DT): 363 MS
ECHO MV E VELOCITY: 0.59 M/S
ECHO MV E/A RATIO: 0.55
ECHO MV E/E' LATERAL: 8.43
ECHO MV E/E' RATIO (AVERAGED): 11.59
ECHO MV E/E' SEPTAL: 14.75
ECHO MV LVOT VTI INDEX: 1.01
ECHO MV MAX VELOCITY: 1.2 M/S
ECHO MV MEAN GRADIENT: 2 MMHG
ECHO MV MEAN VELOCITY: 0.6 M/S
ECHO MV PEAK GRADIENT: 6 MMHG
ECHO MV REGURGITANT PEAK GRADIENT: 41 MMHG
ECHO MV REGURGITANT PEAK VELOCITY: 3.2 M/S
ECHO MV VTI: 30.5 CM
ECHO RIGHT VENTRICULAR SYSTOLIC PRESSURE (RVSP): 25 MMHG
ECHO RV TAPSE: 1.9 CM (ref 1.7–?)
ECHO TV REGURGITANT MAX VELOCITY: 2.08 M/S
ECHO TV REGURGITANT PEAK GRADIENT: 17 MMHG
GLUCOSE BLD STRIP.AUTO-MCNC: 186 MG/DL (ref 65–117)
GLUCOSE UR STRIP.AUTO-MCNC: 50 MG/DL
HGB UR QL STRIP: ABNORMAL
KETONES UR QL STRIP.AUTO: 5 MG/DL
LEUKOCYTE ESTERASE UR QL STRIP.AUTO: ABNORMAL
MRSA DNA SPEC QL NAA+PROBE: NOT DETECTED
NITRITE UR QL STRIP.AUTO: POSITIVE
PERFORMED BY, TECHID: ABNORMAL
PH UR STRIP: 6 [PH] (ref 5–8)
PROT UR STRIP-MCNC: 30 MG/DL
RBC #/AREA URNS HPF: ABNORMAL /HPF (ref 0–5)
SP GR UR REFRACTOMETRY: 1.01 (ref 1–1.03)
UROBILINOGEN UR QL STRIP.AUTO: 0.1 EU/DL (ref 0.1–1)
WBC URNS QL MICRO: >100 /HPF (ref 0–4)

## 2022-04-29 PROCEDURE — 77030019880 HC ABD PLLW HIP DJOR -B: Performed by: ORTHOPAEDIC SURGERY

## 2022-04-29 PROCEDURE — 74011000250 HC RX REV CODE- 250: Performed by: ORTHOPAEDIC SURGERY

## 2022-04-29 PROCEDURE — 93306 TTE W/DOPPLER COMPLETE: CPT

## 2022-04-29 PROCEDURE — 77030018771 HC KT PREP BN CEM ZIMM -C: Performed by: ORTHOPAEDIC SURGERY

## 2022-04-29 PROCEDURE — 2709999900 HC NON-CHARGEABLE SUPPLY: Performed by: ORTHOPAEDIC SURGERY

## 2022-04-29 PROCEDURE — 77030040361 HC SLV COMPR DVT MDII -B: Performed by: ORTHOPAEDIC SURGERY

## 2022-04-29 PROCEDURE — 77030040236 HC DEV DRSG WND PICO S&N -D: Performed by: ORTHOPAEDIC SURGERY

## 2022-04-29 PROCEDURE — 74011000258 HC RX REV CODE- 258: Performed by: ORTHOPAEDIC SURGERY

## 2022-04-29 PROCEDURE — 87086 URINE CULTURE/COLONY COUNT: CPT

## 2022-04-29 PROCEDURE — 0SRR0J9 REPLACEMENT OF RIGHT HIP JOINT, FEMORAL SURFACE WITH SYNTHETIC SUBSTITUTE, CEMENTED, OPEN APPROACH: ICD-10-PCS | Performed by: ORTHOPAEDIC SURGERY

## 2022-04-29 PROCEDURE — 77030010783 HC BOWL MX BN CEM J&J -B: Performed by: ORTHOPAEDIC SURGERY

## 2022-04-29 PROCEDURE — 74011250636 HC RX REV CODE- 250/636: Performed by: NURSE ANESTHETIST, CERTIFIED REGISTERED

## 2022-04-29 PROCEDURE — 77030031139 HC SUT VCRL2 J&J -A: Performed by: ORTHOPAEDIC SURGERY

## 2022-04-29 PROCEDURE — 87077 CULTURE AEROBIC IDENTIFY: CPT

## 2022-04-29 PROCEDURE — 87186 SC STD MICRODIL/AGAR DIL: CPT

## 2022-04-29 PROCEDURE — 77030006835 HC BLD SAW SAG STRY -B: Performed by: ORTHOPAEDIC SURGERY

## 2022-04-29 PROCEDURE — C1713 ANCHOR/SCREW BN/BN,TIS/BN: HCPCS | Performed by: ORTHOPAEDIC SURGERY

## 2022-04-29 PROCEDURE — 72170 X-RAY EXAM OF PELVIS: CPT

## 2022-04-29 PROCEDURE — 82962 GLUCOSE BLOOD TEST: CPT

## 2022-04-29 PROCEDURE — P9045 ALBUMIN (HUMAN), 5%, 250 ML: HCPCS | Performed by: NURSE ANESTHETIST, CERTIFIED REGISTERED

## 2022-04-29 PROCEDURE — 74011000250 HC RX REV CODE- 250: Performed by: NURSE ANESTHETIST, CERTIFIED REGISTERED

## 2022-04-29 PROCEDURE — 74011250637 HC RX REV CODE- 250/637: Performed by: ORTHOPAEDIC SURGERY

## 2022-04-29 PROCEDURE — 87641 MR-STAPH DNA AMP PROBE: CPT

## 2022-04-29 PROCEDURE — 74011250636 HC RX REV CODE- 250/636: Performed by: ORTHOPAEDIC SURGERY

## 2022-04-29 PROCEDURE — 77030011264 HC ELECTRD BLD EXT COVD -A: Performed by: ORTHOPAEDIC SURGERY

## 2022-04-29 PROCEDURE — 93005 ELECTROCARDIOGRAM TRACING: CPT

## 2022-04-29 PROCEDURE — 74011250636 HC RX REV CODE- 250/636: Performed by: EMERGENCY MEDICINE

## 2022-04-29 PROCEDURE — 76060000037 HC ANESTHESIA 3 TO 3.5 HR: Performed by: ORTHOPAEDIC SURGERY

## 2022-04-29 PROCEDURE — C1776 JOINT DEVICE (IMPLANTABLE): HCPCS | Performed by: ORTHOPAEDIC SURGERY

## 2022-04-29 PROCEDURE — 81001 URINALYSIS AUTO W/SCOPE: CPT

## 2022-04-29 PROCEDURE — 76010000133 HC OR TIME 3 TO 3.5 HR: Performed by: ORTHOPAEDIC SURGERY

## 2022-04-29 PROCEDURE — 77030010785: Performed by: ORTHOPAEDIC SURGERY

## 2022-04-29 PROCEDURE — 77030038692 HC WND DEB SYS IRMX -B: Performed by: ORTHOPAEDIC SURGERY

## 2022-04-29 PROCEDURE — 65270000029 HC RM PRIVATE

## 2022-04-29 PROCEDURE — 76210000006 HC OR PH I REC 0.5 TO 1 HR: Performed by: ORTHOPAEDIC SURGERY

## 2022-04-29 PROCEDURE — 77030013708 HC HNDPC SUC IRR PULS STRY –B: Performed by: ORTHOPAEDIC SURGERY

## 2022-04-29 PROCEDURE — 76000 FLUOROSCOPY <1 HR PHYS/QHP: CPT

## 2022-04-29 DEVICE — STEM FEM SZ 4 L114MM NK L34MM 40MM OFFSET 130DEG CALCAR HIP: Type: IMPLANTABLE DEVICE | Site: HIP | Status: FUNCTIONAL

## 2022-04-29 DEVICE — CENTRALIZER STEM DIA12MM DST FEM CEM MOLD SUMMIT BASIC: Type: IMPLANTABLE DEVICE | Site: HIP | Status: FUNCTIONAL

## 2022-04-29 DEVICE — HEAD BPLR OD49MM ID28MM FEM HIP SELF CNTR: Type: IMPLANTABLE DEVICE | Site: HIP | Status: FUNCTIONAL

## 2022-04-29 DEVICE — HIP H4 HEMI UNI BIPLR IMPL CAPPED H4: Type: IMPLANTABLE DEVICE | Site: HIP | Status: FUNCTIONAL

## 2022-04-29 DEVICE — HEAD FEM DIA28MM +5MM OFFSET STD 12/14 TAPR ARTC EZ HIP MTL: Type: IMPLANTABLE DEVICE | Site: HIP | Status: FUNCTIONAL

## 2022-04-29 DEVICE — CEMENT BNE 20ML 41GM FULL DOSE PMMA W/ TOBRA M VISC RADPQ: Type: IMPLANTABLE DEVICE | Site: HIP | Status: FUNCTIONAL

## 2022-04-29 RX ORDER — PROPOFOL 10 MG/ML
INJECTION, EMULSION INTRAVENOUS AS NEEDED
Status: DISCONTINUED | OUTPATIENT
Start: 2022-04-29 | End: 2022-04-29 | Stop reason: HOSPADM

## 2022-04-29 RX ORDER — SODIUM CHLORIDE 0.9 % (FLUSH) 0.9 %
5-40 SYRINGE (ML) INJECTION EVERY 8 HOURS
Status: DISCONTINUED | OUTPATIENT
Start: 2022-04-29 | End: 2022-05-04

## 2022-04-29 RX ORDER — ALBUTEROL SULFATE 0.83 MG/ML
2.5 SOLUTION RESPIRATORY (INHALATION) AS NEEDED
Status: DISCONTINUED | OUTPATIENT
Start: 2022-04-29 | End: 2022-04-29 | Stop reason: HOSPADM

## 2022-04-29 RX ORDER — ROCURONIUM BROMIDE 10 MG/ML
INJECTION, SOLUTION INTRAVENOUS AS NEEDED
Status: DISCONTINUED | OUTPATIENT
Start: 2022-04-29 | End: 2022-04-29 | Stop reason: HOSPADM

## 2022-04-29 RX ORDER — LOSARTAN POTASSIUM AND HYDROCHLOROTHIAZIDE 12.5; 5 MG/1; MG/1
1 TABLET ORAL DAILY
Status: DISCONTINUED | OUTPATIENT
Start: 2022-04-29 | End: 2022-04-29

## 2022-04-29 RX ORDER — DEXAMETHASONE SODIUM PHOSPHATE 4 MG/ML
INJECTION, SOLUTION INTRA-ARTICULAR; INTRALESIONAL; INTRAMUSCULAR; INTRAVENOUS; SOFT TISSUE AS NEEDED
Status: DISCONTINUED | OUTPATIENT
Start: 2022-04-29 | End: 2022-04-29 | Stop reason: HOSPADM

## 2022-04-29 RX ORDER — METFORMIN HYDROCHLORIDE 500 MG/1
500 TABLET ORAL 2 TIMES DAILY WITH MEALS
Status: DISCONTINUED | OUTPATIENT
Start: 2022-04-29 | End: 2022-05-04 | Stop reason: HOSPADM

## 2022-04-29 RX ORDER — DIPHENHYDRAMINE HYDROCHLORIDE 50 MG/ML
6.25 INJECTION, SOLUTION INTRAMUSCULAR; INTRAVENOUS AS NEEDED
Status: DISCONTINUED | OUTPATIENT
Start: 2022-04-29 | End: 2022-04-29 | Stop reason: HOSPADM

## 2022-04-29 RX ORDER — ATORVASTATIN CALCIUM 20 MG/1
20 TABLET, FILM COATED ORAL DAILY
Status: DISCONTINUED | OUTPATIENT
Start: 2022-04-29 | End: 2022-05-04 | Stop reason: HOSPADM

## 2022-04-29 RX ORDER — EPHEDRINE SULFATE/0.9% NACL/PF 50 MG/5 ML
5 SYRINGE (ML) INTRAVENOUS AS NEEDED
Status: DISCONTINUED | OUTPATIENT
Start: 2022-04-29 | End: 2022-04-29 | Stop reason: HOSPADM

## 2022-04-29 RX ORDER — TRAMADOL HYDROCHLORIDE 50 MG/1
50 TABLET ORAL
Status: DISCONTINUED | OUTPATIENT
Start: 2022-04-29 | End: 2022-05-04 | Stop reason: HOSPADM

## 2022-04-29 RX ORDER — ASPIRIN 81 MG/1
81 TABLET ORAL DAILY
Status: DISCONTINUED | OUTPATIENT
Start: 2022-04-29 | End: 2022-04-29

## 2022-04-29 RX ORDER — LIDOCAINE HYDROCHLORIDE 20 MG/ML
INJECTION, SOLUTION EPIDURAL; INFILTRATION; INTRACAUDAL; PERINEURAL AS NEEDED
Status: DISCONTINUED | OUTPATIENT
Start: 2022-04-29 | End: 2022-04-29 | Stop reason: HOSPADM

## 2022-04-29 RX ORDER — KETOCONAZOLE 20 MG/G
CREAM TOPICAL DAILY
Status: DISCONTINUED | OUTPATIENT
Start: 2022-04-30 | End: 2022-05-04 | Stop reason: HOSPADM

## 2022-04-29 RX ORDER — AMOXICILLIN 250 MG
1 CAPSULE ORAL 2 TIMES DAILY
Status: DISCONTINUED | OUTPATIENT
Start: 2022-04-29 | End: 2022-05-04 | Stop reason: HOSPADM

## 2022-04-29 RX ORDER — FENTANYL CITRATE 50 UG/ML
INJECTION, SOLUTION INTRAMUSCULAR; INTRAVENOUS AS NEEDED
Status: DISCONTINUED | OUTPATIENT
Start: 2022-04-29 | End: 2022-04-29 | Stop reason: HOSPADM

## 2022-04-29 RX ORDER — HYDROCODONE BITARTRATE AND ACETAMINOPHEN 5; 325 MG/1; MG/1
1 TABLET ORAL AS NEEDED
Status: DISCONTINUED | OUTPATIENT
Start: 2022-04-29 | End: 2022-04-29 | Stop reason: HOSPADM

## 2022-04-29 RX ORDER — GLYCOPYRROLATE 0.2 MG/ML
INJECTION INTRAMUSCULAR; INTRAVENOUS AS NEEDED
Status: DISCONTINUED | OUTPATIENT
Start: 2022-04-29 | End: 2022-04-29 | Stop reason: HOSPADM

## 2022-04-29 RX ORDER — SODIUM CHLORIDE 9 MG/ML
INJECTION, SOLUTION INTRAVENOUS
Status: DISCONTINUED | OUTPATIENT
Start: 2022-04-29 | End: 2022-04-29 | Stop reason: HOSPADM

## 2022-04-29 RX ORDER — SODIUM CHLORIDE, SODIUM LACTATE, POTASSIUM CHLORIDE, CALCIUM CHLORIDE 600; 310; 30; 20 MG/100ML; MG/100ML; MG/100ML; MG/100ML
INJECTION, SOLUTION INTRAVENOUS
Status: DISCONTINUED | OUTPATIENT
Start: 2022-04-29 | End: 2022-04-29

## 2022-04-29 RX ORDER — FENTANYL CITRATE 50 UG/ML
25 INJECTION, SOLUTION INTRAMUSCULAR; INTRAVENOUS
Status: DISCONTINUED | OUTPATIENT
Start: 2022-04-29 | End: 2022-04-29 | Stop reason: HOSPADM

## 2022-04-29 RX ORDER — FACIAL-BODY WIPES
10 EACH TOPICAL DAILY PRN
Status: DISCONTINUED | OUTPATIENT
Start: 2022-05-01 | End: 2022-05-04 | Stop reason: HOSPADM

## 2022-04-29 RX ORDER — NALOXONE HYDROCHLORIDE 0.4 MG/ML
0.4 INJECTION, SOLUTION INTRAMUSCULAR; INTRAVENOUS; SUBCUTANEOUS AS NEEDED
Status: DISCONTINUED | OUTPATIENT
Start: 2022-04-29 | End: 2022-05-04 | Stop reason: HOSPADM

## 2022-04-29 RX ORDER — CALCIUM CARBONATE/VITAMIN D3 600MG-5MCG
1 TABLET ORAL DAILY
Status: DISCONTINUED | OUTPATIENT
Start: 2022-04-29 | End: 2022-05-04 | Stop reason: HOSPADM

## 2022-04-29 RX ORDER — SODIUM CHLORIDE 0.9 % (FLUSH) 0.9 %
5-40 SYRINGE (ML) INJECTION AS NEEDED
Status: DISCONTINUED | OUTPATIENT
Start: 2022-04-29 | End: 2022-04-29 | Stop reason: HOSPADM

## 2022-04-29 RX ORDER — SODIUM CHLORIDE 9 MG/ML
125 INJECTION, SOLUTION INTRAVENOUS CONTINUOUS
Status: DISPENSED | OUTPATIENT
Start: 2022-04-29 | End: 2022-04-30

## 2022-04-29 RX ORDER — ACETAMINOPHEN 325 MG/1
650 TABLET ORAL EVERY 6 HOURS
Status: DISCONTINUED | OUTPATIENT
Start: 2022-04-29 | End: 2022-05-04 | Stop reason: HOSPADM

## 2022-04-29 RX ORDER — SUCCINYLCHOLINE CHLORIDE 20 MG/ML
INJECTION INTRAMUSCULAR; INTRAVENOUS AS NEEDED
Status: DISCONTINUED | OUTPATIENT
Start: 2022-04-29 | End: 2022-04-29 | Stop reason: HOSPADM

## 2022-04-29 RX ORDER — NEOSTIGMINE METHYLSULFATE 5 MG/5 ML
SYRINGE (ML) INTRAVENOUS AS NEEDED
Status: DISCONTINUED | OUTPATIENT
Start: 2022-04-29 | End: 2022-04-29 | Stop reason: HOSPADM

## 2022-04-29 RX ORDER — ONDANSETRON 2 MG/ML
INJECTION INTRAMUSCULAR; INTRAVENOUS AS NEEDED
Status: DISCONTINUED | OUTPATIENT
Start: 2022-04-29 | End: 2022-04-29 | Stop reason: HOSPADM

## 2022-04-29 RX ORDER — POLYETHYLENE GLYCOL 3350 17 G/17G
17 POWDER, FOR SOLUTION ORAL DAILY
Status: DISCONTINUED | OUTPATIENT
Start: 2022-04-30 | End: 2022-05-04 | Stop reason: HOSPADM

## 2022-04-29 RX ORDER — DOCUSATE SODIUM 100 MG/1
100 CAPSULE, LIQUID FILLED ORAL DAILY
Status: DISCONTINUED | OUTPATIENT
Start: 2022-04-29 | End: 2022-05-04 | Stop reason: HOSPADM

## 2022-04-29 RX ORDER — CEFAZOLIN SODIUM 1 G/3ML
INJECTION, POWDER, FOR SOLUTION INTRAMUSCULAR; INTRAVENOUS AS NEEDED
Status: DISCONTINUED | OUTPATIENT
Start: 2022-04-29 | End: 2022-04-29 | Stop reason: HOSPADM

## 2022-04-29 RX ORDER — HYDROMORPHONE HYDROCHLORIDE 1 MG/ML
0.25 INJECTION, SOLUTION INTRAMUSCULAR; INTRAVENOUS; SUBCUTANEOUS
Status: DISCONTINUED | OUTPATIENT
Start: 2022-04-29 | End: 2022-04-29 | Stop reason: HOSPADM

## 2022-04-29 RX ORDER — NORETHINDRONE AND ETHINYL ESTRADIOL 0.5-0.035
KIT ORAL AS NEEDED
Status: DISCONTINUED | OUTPATIENT
Start: 2022-04-29 | End: 2022-04-29 | Stop reason: HOSPADM

## 2022-04-29 RX ORDER — ACETAMINOPHEN 500 MG
500 TABLET ORAL
Status: DISCONTINUED | OUTPATIENT
Start: 2022-04-29 | End: 2022-05-04 | Stop reason: HOSPADM

## 2022-04-29 RX ORDER — BUPIVACAINE HYDROCHLORIDE 5 MG/ML
INJECTION, SOLUTION EPIDURAL; INTRACAUDAL AS NEEDED
Status: DISCONTINUED | OUTPATIENT
Start: 2022-04-29 | End: 2022-04-29 | Stop reason: HOSPADM

## 2022-04-29 RX ORDER — TRANEXAMIC ACID 100 MG/ML
INJECTION, SOLUTION INTRAVENOUS
Status: DISCONTINUED
Start: 2022-04-29 | End: 2022-04-29 | Stop reason: WASHOUT

## 2022-04-29 RX ORDER — ONDANSETRON 2 MG/ML
4 INJECTION INTRAMUSCULAR; INTRAVENOUS AS NEEDED
Status: DISCONTINUED | OUTPATIENT
Start: 2022-04-29 | End: 2022-04-29 | Stop reason: HOSPADM

## 2022-04-29 RX ORDER — ALBUMIN HUMAN 50 G/1000ML
SOLUTION INTRAVENOUS AS NEEDED
Status: DISCONTINUED | OUTPATIENT
Start: 2022-04-29 | End: 2022-04-29 | Stop reason: HOSPADM

## 2022-04-29 RX ORDER — SODIUM CHLORIDE 0.9 % (FLUSH) 0.9 %
5-40 SYRINGE (ML) INJECTION AS NEEDED
Status: DISCONTINUED | OUTPATIENT
Start: 2022-04-29 | End: 2022-05-04 | Stop reason: HOSPADM

## 2022-04-29 RX ORDER — HEPARIN SODIUM 5000 [USP'U]/ML
5000 INJECTION, SOLUTION INTRAVENOUS; SUBCUTANEOUS EVERY 12 HOURS
Status: DISCONTINUED | OUTPATIENT
Start: 2022-04-29 | End: 2022-04-29

## 2022-04-29 RX ORDER — PANTOPRAZOLE SODIUM 20 MG/1
20 TABLET, DELAYED RELEASE ORAL
Status: DISCONTINUED | OUTPATIENT
Start: 2022-04-30 | End: 2022-05-04 | Stop reason: HOSPADM

## 2022-04-29 RX ORDER — AMLODIPINE BESYLATE 5 MG/1
10 TABLET ORAL DAILY
Status: DISCONTINUED | OUTPATIENT
Start: 2022-04-29 | End: 2022-05-04 | Stop reason: HOSPADM

## 2022-04-29 RX ORDER — PREGABALIN 50 MG/1
50 CAPSULE ORAL 2 TIMES DAILY
Status: DISCONTINUED | OUTPATIENT
Start: 2022-04-29 | End: 2022-05-04 | Stop reason: HOSPADM

## 2022-04-29 RX ORDER — ALOGLIPTIN 12.5 MG/1
12.5 TABLET, FILM COATED ORAL DAILY
Status: DISCONTINUED | OUTPATIENT
Start: 2022-04-30 | End: 2022-05-03

## 2022-04-29 RX ADMIN — CEFAZOLIN SODIUM 2 G: 1 INJECTION, POWDER, FOR SOLUTION INTRAMUSCULAR; INTRAVENOUS at 20:55

## 2022-04-29 RX ADMIN — ALBUMIN (HUMAN) 12.5 G: 12.5 INJECTION, SOLUTION INTRAVENOUS at 18:06

## 2022-04-29 RX ADMIN — PHENYLEPHRINE HYDROCHLORIDE 100 MCG: 10 INJECTION INTRAVENOUS at 16:57

## 2022-04-29 RX ADMIN — SODIUM CHLORIDE: 9 INJECTION, SOLUTION INTRAVENOUS at 18:29

## 2022-04-29 RX ADMIN — TRANEXAMIC ACID 1 G: 1 INJECTION, SOLUTION INTRAVENOUS at 18:56

## 2022-04-29 RX ADMIN — EPHEDRINE SULFATE 5 MG: 50 INJECTION INTRAVENOUS at 18:10

## 2022-04-29 RX ADMIN — LIDOCAINE HYDROCHLORIDE 60 MG: 20 INJECTION, SOLUTION EPIDURAL; INFILTRATION; INTRACAUDAL; PERINEURAL at 16:43

## 2022-04-29 RX ADMIN — EPHEDRINE SULFATE 5 MG: 50 INJECTION INTRAVENOUS at 18:27

## 2022-04-29 RX ADMIN — FENTANYL CITRATE 50 MCG: 50 INJECTION, SOLUTION INTRAMUSCULAR; INTRAVENOUS at 17:45

## 2022-04-29 RX ADMIN — SENNOSIDES AND DOCUSATE SODIUM 1 TABLET: 50; 8.6 TABLET ORAL at 20:56

## 2022-04-29 RX ADMIN — PHENYLEPHRINE HYDROCHLORIDE 100 MCG: 10 INJECTION INTRAVENOUS at 18:08

## 2022-04-29 RX ADMIN — PHENYLEPHRINE HYDROCHLORIDE 100 MCG: 10 INJECTION INTRAVENOUS at 16:50

## 2022-04-29 RX ADMIN — PROPOFOL 50 MG: 10 INJECTION, EMULSION INTRAVENOUS at 17:35

## 2022-04-29 RX ADMIN — SODIUM CHLORIDE: 9 INJECTION, SOLUTION INTRAVENOUS at 18:12

## 2022-04-29 RX ADMIN — PHENYLEPHRINE HYDROCHLORIDE 100 MCG: 10 INJECTION INTRAVENOUS at 17:00

## 2022-04-29 RX ADMIN — TRANEXAMIC ACID 1 G: 1 INJECTION, SOLUTION INTRAVENOUS at 17:05

## 2022-04-29 RX ADMIN — SODIUM CHLORIDE 125 ML/HR: 9 INJECTION, SOLUTION INTRAVENOUS at 20:52

## 2022-04-29 RX ADMIN — ONDANSETRON 4 MG: 2 INJECTION INTRAMUSCULAR; INTRAVENOUS at 17:23

## 2022-04-29 RX ADMIN — SODIUM CHLORIDE: 9 INJECTION, SOLUTION INTRAVENOUS at 16:54

## 2022-04-29 RX ADMIN — ROCURONIUM BROMIDE 50 MG: 10 INJECTION, SOLUTION INTRAVENOUS at 17:05

## 2022-04-29 RX ADMIN — EPHEDRINE SULFATE 5 MG: 50 INJECTION INTRAVENOUS at 18:55

## 2022-04-29 RX ADMIN — Medication 5 MG: at 19:10

## 2022-04-29 RX ADMIN — DEXAMETHASONE SODIUM PHOSPHATE 4 MG: 4 INJECTION, SOLUTION INTRA-ARTICULAR; INTRALESIONAL; INTRAMUSCULAR; INTRAVENOUS; SOFT TISSUE at 17:23

## 2022-04-29 RX ADMIN — ACETAMINOPHEN 650 MG: 325 TABLET ORAL at 20:56

## 2022-04-29 RX ADMIN — PREGABALIN 50 MG: 50 CAPSULE ORAL at 20:56

## 2022-04-29 RX ADMIN — CEFAZOLIN SODIUM 2 G: 1 INJECTION, POWDER, FOR SOLUTION INTRAMUSCULAR; INTRAVENOUS at 17:10

## 2022-04-29 RX ADMIN — CEFAZOLIN SODIUM 2 G: 1 INJECTION, POWDER, FOR SOLUTION INTRAMUSCULAR; INTRAVENOUS at 09:41

## 2022-04-29 RX ADMIN — PROPOFOL 100 MG: 10 INJECTION, EMULSION INTRAVENOUS at 16:43

## 2022-04-29 RX ADMIN — SODIUM CHLORIDE, PRESERVATIVE FREE 10 ML: 5 INJECTION INTRAVENOUS at 21:08

## 2022-04-29 RX ADMIN — FENTANYL CITRATE 50 MCG: 50 INJECTION, SOLUTION INTRAMUSCULAR; INTRAVENOUS at 16:43

## 2022-04-29 RX ADMIN — FENTANYL CITRATE 50 MCG: 50 INJECTION, SOLUTION INTRAMUSCULAR; INTRAVENOUS at 17:10

## 2022-04-29 RX ADMIN — FENTANYL CITRATE 50 MCG: 50 INJECTION, SOLUTION INTRAMUSCULAR; INTRAVENOUS at 19:13

## 2022-04-29 RX ADMIN — EPHEDRINE SULFATE 5 MG: 50 INJECTION INTRAVENOUS at 18:49

## 2022-04-29 RX ADMIN — PHENYLEPHRINE HYDROCHLORIDE 100 MCG: 10 INJECTION INTRAVENOUS at 16:53

## 2022-04-29 RX ADMIN — SODIUM CHLORIDE 75 ML/HR: 9 INJECTION, SOLUTION INTRAVENOUS at 09:42

## 2022-04-29 RX ADMIN — PROPOFOL 50 MG: 10 INJECTION, EMULSION INTRAVENOUS at 17:28

## 2022-04-29 RX ADMIN — GLYCOPYRROLATE 0.6 MG: 0.2 INJECTION INTRAMUSCULAR; INTRAVENOUS at 19:10

## 2022-04-29 RX ADMIN — SUCCINYLCHOLINE CHLORIDE 100 MG: 20 INJECTION, SOLUTION INTRAMUSCULAR; INTRAVENOUS at 16:43

## 2022-04-29 NOTE — H&P
History and Physical (Inpatient)    Patient:    Santos rAceo Short   80 y.o. Chief Complaint:   Chief Complaint   Patient presents with    Hip Pain         History of Present Illness: This is a 66-year-old  female who frail with hypertension diabetes and osteoporosis resident of [address] [place] [address] present with right hip pain. She is not much of an historian. She said while she was reaching down to 4 her rug she gently fell without any head trauma she has been having pain in the right hip. She presented to the emergency room and was found to have right hip fracture. She denies any headache chest pain shortness of breath she denies any prior history of congestive heart failure or heart failure symptoms. ROS: She denies any chronically blurry vision or URI symptoms she denies any chest pain palpitation cough shortness of breath abdominal pain nausea vomiting diarrhea constipation. She has mild incontinence she wears depends. She denies any discharge urgency. She gets occasional leg swelling. She sleeps okay she not losing weight. History:  Past Medical History:   Diagnosis Date    DM (diabetes mellitus) ([address]     High cholesterol     History of multiple allergies     HTN (hypertension)     Hypertension     Osteoporosis     Type 2 diabetes mellitus (HCC)      Past surgical history status post cataract repair    Social history. She is . She has no children. She does not smoke or drink alcohol. She is a resident of the assisted living facility at [facility] .       Family History   Problem Relation Age of Onset    Diabetes Mother     Hypertension Mother     Osteoporosis Sister        Allergies:  No Known Allergies    Current Medications:    Current Facility-Administered Medications:     amLODIPine (NORVASC) tablet 10 mg, 10 mg, Oral, DAILY, Mohsen Gallardo MD    aspirin delayed-release tablet 81 mg, 81 mg, Oral, DAILY, Taryn Luo MD    atorvastatin (LIPITOR) tablet 20 mg, 20 mg, Oral, DAILY, Ben Marroquin MD    . PHARMACY TO SUBSTITUTE PER PROTOCOL (Reordered from: calcium-cholecalciferol, D3, (CALTRATE 600+D) tablet), , , Per Protocol, Ben aMrroquin MD    docusate sodium (COLACE) capsule 100 mg, 100 mg, Oral, DAILY, Mohsen Roman MD    [START ON 4/30/2022] pantoprazole (PROTONIX) tablet 20 mg, 20 mg, Oral, ACB, Mohsen Roman MD    losartan-hydroCHLOROthiazide (HYZAAR) 50-12.5 mg per tablet 1 Tablet, 1 Tablet, Oral, DAILY, Mohsen Corrales MD    metFORMIN (GLUCOPHAGE) tablet 500 mg, 500 mg, Oral, BID WITH MEALS, Mohsen Corrales MD    pregabalin (LYRICA) capsule 50 mg, 50 mg, Oral, BID, Ben Marroquin MD    . PHARMACY TO SUBSTITUTE PER PROTOCOL (Reordered from: SITagliptin (Januvia) 50 mg tablet), , , Per Protocol, Ben Marroquin MD    acetaminophen (TYLENOL) tablet 500 mg, 500 mg, Oral, Q4H PRN, Ben Marroquin MD    0.9% sodium chloride infusion, 75 mL/hr, IntraVENous, CONTINUOUS, Chelsy Linton MD, Last Rate: 75 mL/hr at 04/29/22 0942, 75 mL/hr at 04/29/22 7186       Physical Exam:  Visit Vitals  BP (!) 145/72 (BP 1 Location: Right upper arm, BP Patient Position: At rest)   Pulse 78   Temp 98.2 °F (36.8 °C)   Resp 20   Ht 5' 4\" (1.626 m)   Wt 56.7 kg (125 lb)   SpO2 91%   Breastfeeding No   BMI 21.46 kg/m²     On examination she is in frail looking  female comfortably no respiratory distress  HEENT normocephalic atraumatic pupils reactive anicteric sclera oral mucosa with upper denture partial no mucosal lesion no thrush  Neck prominent thyroid no distended neck vein no adenopathy  Lungs are clear bilaterally no coarse crackles or wheeze  Heart S1-S2 with extrasystole regular  Abdomen soft nontender nondistended positive bowel sounds no appreciable organomegaly  Lower extremities with varicose veins trace edema right leg shorter  CNS alert and oriented follows command motor strength upper extremity symmetrical.  Right facial droop she said is chronic  Psych cooperative    Impression This is a 44-year-old  female with history of osteoporosis, hypertension, diabetes who presents status post fall found to have right femoral neck fracture admitted for further care. Findings/Diagnosis: Acute right femoral neck fracture  #2 osteoporosis  #3. Hypertension  #4. Diabetes  #5. Ground-level fall      Laboratory:      Recent Results (from the past 24 hour(s))   CBC WITH AUTOMATED DIFF    Collection Time: 04/28/22  7:32 PM   Result Value Ref Range    WBC 7.9 3.6 - 11.0 K/uL    RBC 4.25 3.80 - 5.20 M/uL    HGB 12.6 11.5 - 16.0 g/dL    HCT 38.8 35.0 - 47.0 %    MCV 91.3 80.0 - 99.0 FL    MCH 29.6 26.0 - 34.0 PG    MCHC 32.5 30.0 - 36.5 g/dL    RDW 13.9 11.5 - 14.5 %    PLATELET 805 515 - 006 K/uL    MPV 11.7 8.9 - 12.9 FL    NRBC 0.0 0.0  WBC    ABSOLUTE NRBC 0.00 0.00 - 0.01 K/uL    NEUTROPHILS 82 (H) 32 - 75 %    LYMPHOCYTES 11 (L) 12 - 49 %    MONOCYTES 7 5 - 13 %    EOSINOPHILS 0 0 - 7 %    BASOPHILS 0 0 - 1 %    IMMATURE GRANULOCYTES 0 0 - 0.5 %    ABS. NEUTROPHILS 6.4 1.8 - 8.0 K/UL    ABS. LYMPHOCYTES 0.9 0.8 - 3.5 K/UL    ABS. MONOCYTES 0.6 0.0 - 1.0 K/UL    ABS. EOSINOPHILS 0.0 0.0 - 0.4 K/UL    ABS. BASOPHILS 0.0 0.0 - 0.1 K/UL    ABS. IMM.  GRANS. 0.0 0.00 - 0.04 K/UL    DF AUTOMATED     METABOLIC PANEL, COMPREHENSIVE    Collection Time: 04/28/22  7:32 PM   Result Value Ref Range    Sodium 137 136 - 145 mmol/L    Potassium 3.7 3.5 - 5.1 mmol/L    Chloride 102 97 - 108 mmol/L    CO2 29 21 - 32 mmol/L    Anion gap 6 5 - 15 mmol/L    Glucose 254 (H) 65 - 100 mg/dL    BUN 27 (H) 6 - 20 mg/dL    Creatinine 0.89 0.55 - 1.02 mg/dL    BUN/Creatinine ratio 30 (H) 12 - 20      GFR est AA >60 >60 ml/min/1.73m2    GFR est non-AA 59 (L) >60 ml/min/1.73m2    Calcium 9.4 8.5 - 10.1 mg/dL    Bilirubin, total 0.3 0.2 - 1.0 mg/dL    AST (SGOT) 14 (L) 15 - 37 U/L    ALT (SGPT) 21 12 - 78 U/L    Alk. phosphatase 62 45 - 117 U/L    Protein, total 7.3 6.4 - 8.2 g/dL    Albumin 3.4 (L) 3.5 - 5.0 g/dL    Globulin 3.9 2.0 - 4.0 g/dL    A-G Ratio 0.9 (L) 1.1 - 2.2     PROTHROMBIN TIME + INR    Collection Time: 04/28/22  7:32 PM   Result Value Ref Range    Prothrombin time 12.2 11.9 - 14.6 sec    INR 0.9 0.9 - 1.1     TYPE & SCREEN    Collection Time: 04/28/22  8:19 PM   Result Value Ref Range    Crossmatch Expiration 05/01/2022,2359     ABO/Rh(D) A Negative     Antibody screen Negative    MRSA SCREEN - PCR (NASAL)    Collection Time: 04/29/22  3:45 AM   Result Value Ref Range    MRSA by PCR, Nasal Not Detected Not Detected         XR FEMUR RT 2 VS   Final Result   Findings/impression:   The patient is status post prior placement of a 3 component right total knee   prosthesis. There is increased radiolucency at the bone metallic interface of   the tibial component which may indicate a mild degree of chronic loosening. There also is a mild to moderate varus angle of the tibial tray relative to a   line drawn perpendicular to the tibial diaphysis. There is an acute transcervical femoral neck fracture with moderate displacement   and angulation at the fracture site. This was described on a recent CT   evaluation of the pelvis. This examination is negative for additional fractures throughout the remainder   of the right femur. XR CHEST PORT   Final Result   1. IMPRESSION: No acute abnormality identified. CT PELV WO CONT   Final Result   1. There is an acute displaced mid (transcervical) fracture of the right   femoral neck. 2.  There are older fractures involving the right superior and ischiopubic rami. 3.  The patient status post prior placement of a left bipolar hip prosthesis. Please see the above text for further details. Plan of Care/Planned Procedure: Admit to the hospital.  Surgical consultation has been obtained with orthopedic.   Will consult cardiology whom I spoken with. DVT prophylaxis. Pain medication as needed. Bed rest.  Monitor check vitamin D level.

## 2022-04-29 NOTE — ROUTINE PROCESS
Ramy Merida, given permission by patient to have patient privacy code. Contact number is [phone].

## 2022-04-29 NOTE — OP NOTES
Operative Note    Patient: Hugo Chacko  YOB: 1923  MRN: [id number]    Date of Procedure: 4/29/2022     Pre-Op Diagnosis: RIGHT FEMORAL NECK FRACTURE    Post-Op Diagnosis: Same as preoperative diagnosis. Procedure(s):  RIGHT HIP HEMIARTHROPLASTY    Surgeon(s):  Fredo Lr MD    Surgical Assistant: None    Anesthesia: General     Estimated Blood Loss (mL):  602    Complications: None    Specimens:   ID Type Source Tests Collected by Time Destination   1 : Urine Urine Oleary Specimen CULTURE, URINE George Grady MD 4/29/2022 1733 Microbiology        Implants:   Implant Name Type Inv.  Item Serial No.  Lot No. LRB No. Used Action   CEMENT BNE 20ML 41GM FULL DOSE PMMA W/ TOBRA M VISC RADPQ - SNA  CEMENT BNE 20ML 41GM FULL DOSE PMMA W/ TOBRA M VISC RADPQ NA [employer] [facility] [id number] Right 1 Implanted   CEMENT BNE 20ML 41GM FULL DOSE PMMA W/ TOBRA M VISC RADPQ - SNA  CEMENT BNE 20ML 41GM FULL DOSE PMMA W/ TOBRA M VISC RADPQ NA [employer] [id number] Right 1 Implanted   STEM FEM SZ 4 L114MM NK L34MM 40MM OFFSET 130DEG CALCAR HIP - SNA  STEM FEM SZ 4 L114MM NK L34MM 40MM OFFSET 130DEG CALCAR HIP NA [employer] [id number] Right 1 Implanted   HEAD BPLR OD49MM [id number] FEM HIP SELF CNTR - SNA  HEAD BPLR OD49MM ID28MM FEM HIP SELF CNTR NA [employer] [id number] Right 1 Implanted   CENTRALIZER STEM [id number] DST FEM GARRY [place] BASIC - SNA  CENTRALIZER STEM [id number] DST FEM GARRY [place] BASIC NA [employer] [id number] Right 1 Implanted   HEAD FEM [id number] +5MM OFFSET STD 12/14 TAPR ARTC EZ HIP MTL - SNA  HEAD FEM [id number] +5MM OFFSET STD 12/14 TAPR ARTC EZ HIP MTL NA [employer]121nexus [id number] Right 1 Implanted       Drains:   External Urinary Catheter 04/29/22 (Active)   Site Assessment Clean, dry, & intact 04/29/22 0835   Urine Output (mL) 1000 ml 04/29/22 0940       Findings: long vertical spike of fracture extending just above the lesser trochanter     Indication for procedure : 80year-old active female sustained a fall a few days prior to the presentation on 4/26/2022 to the emergency department. X-rays and CT scan at that time did not reveal any acute hip fracture. Subsequent to that the patient went home and unfortunately tripped by hitting her right leg to the left leg and had immediate onset pain and difficulty bearing weight on the right lower extremity. X-rays and CT scan obtained 2 days later revealed a displaced femoral neck fracture of the right hip. Risks and benefits of hip hemiarthroplasty was thoroughly reviewed with the patient. Inherent risk of infection, DVT, pulmonary embolism, dislocation, periimplant fractures and abductor lurch was reviewed. Informed surgical consent was obtained subsequently she was scheduled for surgery today. Detailed Description of Procedure: The patient was identified in the preoperative holding area surgical site present from Christofer Amistad was brought back to the operating room she underwent general anesthesia without any complications. Right lower extremity was sterilely prepped and draped in routine orthopedic fashion. Surgical pause was conducted surgery was then commenced following preop administration of Ancef and TXA. 7 cm incision was made along the center of the lesser greater trochanteric region. Skin subtenons tissue was resected IT band was then split. This exposed the abductors. Abductor bursa was then taken down subsequent to which the anterior third of the abductors especially the gluteus medius was then released off the greater trochanteric region. A plane was then created between the abductors and the capsule and a T-shaped capsulectomy was performed. This was closed this was tagged for future closure. This exposed the femoral neck fracture. Hematoma was then evacuated. This confirmed the acuity of the fracture. Femoral neck fracture was then exposed and then the right lower extremity was brought into the bag and flexion external rotation maneuver. Femoral head was then taken out atraumatically using a corkscrew. This measures around 49 mm. Trialing with a 49 mm head had excellent suction Effect. The fracture site was then explored. It was noted the patient had a long spike going inferiorly towards the lesser trochanter but she stopped following 1 cm short of the lesser trochanter. Based on this I chose to proceed with a cemented hemiarthroplasty. Sequential broaching of the femur was then performed and a size 4 had an excellent fit with good stability with a +1.5 and +5 head. Fluoroscopy confirmed appropriate size of the implant. .  Wound was then thoroughly irrigated centralizer was then placed 2 batches of vancomycin TOMEKA cement was then prepared on the back table and then standard cemented technique was used to replace the femoral neck and the appropriate amount of anteversion and then the final implant was cemented. Trialing with a +1 .5head was performed. +5 was deemed to be appropriate for the patient in terms of the leg length as well as a abductor tension. Hence +15 head was then placed in position. Repair was then performed sequentially using the #1 Vicryl for the capsule and #2 FiberWire for the abductors #1 Vicryl for the IT band and 2-0 Vicryl for subcutaneous tissue and staples for the skin. 12 dressing was then placed around the wound. Postoperative instructions include weight-bear as tolerated right lower extremity abductor pillow for 6 weeks #2 follow-up with me in the office in 2 weeks time.     Electronically Signed by Gillian Pedraza MD on 4/29/2022 at 7:30 PM

## 2022-04-29 NOTE — PROGRESS NOTES
Patient off floor for testing and procedure. Is resident at [facility]. Will assess when available.

## 2022-04-29 NOTE — CONSULTS
Consult    NAME: Cherelle Sawyer   :  1923   MRN:  [id number]     Date/Time:  2022 11:32 AM    Patient PCP: Guilherme Norton MD  ________________________________________________________________________      Subjective:   CHIEF COMPLAINT: Fracture of the right femur. HISTORY OF PRESENT ILLNESS:    Chart reviewed. Patient examined. Patient apparently presented with symptoms of pain in the hip and difficulty to bear weight and is found to have fracture of hip and is supposed to undergo surgery. Patient has a history of hypertension diabetes and stroke and cardiac risk stratification is invited. Patient is using walker for ambulation and denies any chest pain or shortness of breath. Past Medical History:   Diagnosis Date    DM (diabetes mellitus) ([address]     High cholesterol     History of multiple allergies     HTN (hypertension)     Hypertension     Osteoporosis     Type 2 diabetes mellitus (HCC)       Past Surgical History:   Procedure Laterality Date    HX APPENDECTOMY      HX HIP ARTHROSCOPY      HX HIP REPLACEMENT      HX HYSTERECTOMY      HX KNEE ARTHROSCOPY       No Known Allergies   Meds:  See below  Social History     Tobacco Use    Smoking status: Never Smoker    Smokeless tobacco: Never Used   Substance Use Topics    Alcohol use: Never   Negative for smoking drinking or drugs. Family History   Problem Relation Age of Onset    Diabetes Mother     Hypertension Mother     Osteoporosis Sister         FAMILY HISTORY: Mother had a diabetes and a heart problem and she  at the age of [de-identified] and father  in his [de-identified] but she does not know cause of demise.       REVIEW OF SYSTEMS:     []         Unable to obtain  ROS due to ---   [x]         Total of 12 systems reviewed as follows:    Constitutional: negative fever, negative chills, negative weight loss  Eyes:   negative visual changes  ENT:   negative sore throat, tongue or lip swelling  Respiratory:  negative cough, negative dyspnea  Cards:  negative for chest pain, palpitations, lower extremity edema  GI:   negative for nausea, vomiting, diarrhea, and abdominal pain  Genitourinary: negative for frequency, dysuria  Integument:  negative for rash   Hematologic:  negative for easy bruising and gum/nose bleeding  Musculoskel: negative for myalgias,  back pain, pain at the right hip. Neurological:  negative for headaches, dizziness, positive for left-sided facial weakness from stroke  Behavl/Psych: negative for feelings of anxiety, depression     Pertinent Positives include :    Objective:      Physical Exam:    Last 24hrs VS reviewed since prior progress note. Most recent are:    Visit Vitals  BP (!) 145/72 (BP 1 Location: Right upper arm, BP Patient Position: At rest)   Pulse 78   Temp 98.2 °F (36.8 °C)   Resp 20   Ht 5' 4\" (1.626 m)   Wt 56.7 kg (125 lb)   SpO2 93%   Breastfeeding No   BMI 21.46 kg/m²       Intake/Output Summary (Last 24 hours) at 4/29/2022 1132  Last data filed at 4/29/2022 0940  Gross per 24 hour   Intake --   Output 1000 ml   Net -1000 ml        General Appearance: Well developed, well nourished, alert & oriented x 3,    no acute distress. Minimal drooping of her left facial part  Ears/Nose/Mouth/Throat: Pupils equal and round, Hearing grossly normal.  Neck: Supple. JVP within normal limits. Carotids good upstrokes, with no bruit. Chest: Lungs clear to auscultation bilaterally. Cardiovascular: Regular rate and rhythm, S1S2 normal, no murmur, rubs, gallops. Abdomen: Soft, non-tender, bowel sounds are active. No organomegaly. Extremities: No edema bilaterally. Femoral pulses +2, Distal Pulses +1. Skin: Warm and dry. Neuro: CN II-XII grossly intact, Strength and sensation grossly intact. Left facial droop evident    []         Post-cath site without hematoma, bruit, tenderness, or thrill. Distal pulses intact. Data:      Telemetry:    EKG:  []  No new EKG for review.         Prior to Admission medications    Medication Sig Start Date End Date Taking? Authorizing Provider   diclofenac (VOLTAREN) 1 % gel Apply  to affected area as needed for Pain. Yes Provider, Historical   losartan-hydroCHLOROthiazide (HYZAAR) 50-12.5 mg per tablet Take 1 Tab by mouth daily. Yes Provider, Historical   amLODIPine (NORVASC) 10 mg tablet Take 10 mg by mouth daily. Yes Provider, Historical   aspirin delayed-release 81 mg tablet Take 81 mg by mouth daily. Yes Provider, Historical   atorvastatin (LIPITOR) 20 mg tablet Take 20 mg by mouth daily. Yes Provider, Historical   calcium-cholecalciferol, D3, (CALTRATE 600+D) tablet Take 1 Tab by mouth daily. Yes Provider, Historical   SITagliptin (Januvia) 50 mg tablet Take 50 mg by mouth daily. Yes Provider, Historical   pregabalin (LYRICA) 100 mg capsule Take  by mouth two (2) times a day. Yes Provider, Historical   metFORMIN (GLUCOPHAGE) 500 mg tablet Take 500 mg by mouth two (2) times daily (with meals). Yes Provider, Historical   estradioL (ESTRACE) 0.01 % (0.1 mg/gram) vaginal cream Insert 2 g into vagina. Twice a week   Yes Provider, Historical   medical supply, miscellaneous (OCUSOFT LID SCRUB) by Does Not Apply route. As needed    Provider, Historical   ketoconazole (NIZORAL) 2 % topical cream Apply  to affected area daily. Patient not taking: Reported on 12/21/2021    Provider, Historical   acetaminophen (Tylenol Extra Strength) 500 mg tablet Take 500 mg by mouth five (5) times daily. Provider, Historical   sodium chloride-aloe vera ([place] Saline) topical gel Apply  to affected area once. Patient not taking: Reported on 12/21/2021    Provider, Historical   docusate sodium (Colace) 100 mg capsule Take 100 mg by mouth as needed. Patient not taking: Reported on 12/21/2021    Provider, Historical   esomeprazole (NEXIUM) 40 mg capsule Take  by mouth daily.   Patient not taking: Reported on 4/28/2022    Provider, Historical valsartan-hydroCHLOROthiazide (DIOVAN-HCT) 160-12.5 mg per tablet Take 1 Tab by mouth daily. Patient not taking: Reported on 12/13/2021    Provider, Historical       Recent Results (from the past 24 hour(s))   CBC WITH AUTOMATED DIFF    Collection Time: 04/28/22  7:32 PM   Result Value Ref Range    WBC 7.9 3.6 - 11.0 K/uL    RBC 4.25 3.80 - 5.20 M/uL    HGB 12.6 11.5 - 16.0 g/dL    HCT 38.8 35.0 - 47.0 %    MCV 91.3 80.0 - 99.0 FL    MCH 29.6 26.0 - 34.0 PG    MCHC 32.5 30.0 - 36.5 g/dL    RDW 13.9 11.5 - 14.5 %    PLATELET 427 949 - 784 K/uL    MPV 11.7 8.9 - 12.9 FL    NRBC 0.0 0.0  WBC    ABSOLUTE NRBC 0.00 0.00 - 0.01 K/uL    NEUTROPHILS 82 (H) 32 - 75 %    LYMPHOCYTES 11 (L) 12 - 49 %    MONOCYTES 7 5 - 13 %    EOSINOPHILS 0 0 - 7 %    BASOPHILS 0 0 - 1 %    IMMATURE GRANULOCYTES 0 0 - 0.5 %    ABS. NEUTROPHILS 6.4 1.8 - 8.0 K/UL    ABS. LYMPHOCYTES 0.9 0.8 - 3.5 K/UL    ABS. MONOCYTES 0.6 0.0 - 1.0 K/UL    ABS. EOSINOPHILS 0.0 0.0 - 0.4 K/UL    ABS. BASOPHILS 0.0 0.0 - 0.1 K/UL    ABS. IMM. GRANS. 0.0 0.00 - 0.04 K/UL    DF AUTOMATED     METABOLIC PANEL, COMPREHENSIVE    Collection Time: 04/28/22  7:32 PM   Result Value Ref Range    Sodium 137 136 - 145 mmol/L    Potassium 3.7 3.5 - 5.1 mmol/L    Chloride 102 97 - 108 mmol/L    CO2 29 21 - 32 mmol/L    Anion gap 6 5 - 15 mmol/L    Glucose 254 (H) 65 - 100 mg/dL    BUN 27 (H) 6 - 20 mg/dL    Creatinine 0.89 0.55 - 1.02 mg/dL    BUN/Creatinine ratio 30 (H) 12 - 20      GFR est AA >60 >60 ml/min/1.73m2    GFR est non-AA 59 (L) >60 ml/min/1.73m2    Calcium 9.4 8.5 - 10.1 mg/dL    Bilirubin, total 0.3 0.2 - 1.0 mg/dL    AST (SGOT) 14 (L) 15 - 37 U/L    ALT (SGPT) 21 12 - 78 U/L    Alk.  phosphatase 62 45 - 117 U/L    Protein, total 7.3 6.4 - 8.2 g/dL    Albumin 3.4 (L) 3.5 - 5.0 g/dL    Globulin 3.9 2.0 - 4.0 g/dL    A-G Ratio 0.9 (L) 1.1 - 2.2     PROTHROMBIN TIME + INR    Collection Time: 04/28/22  7:32 PM   Result Value Ref Range    Prothrombin time 12.2 11.9 - 14.6 sec    INR 0.9 0.9 - 1.1     TYPE & SCREEN    Collection Time: 04/28/22  8:19 PM   Result Value Ref Range    Crossmatch Expiration 05/01/2022,2359     ABO/Rh(D) A Negative     Antibody screen Negative    MRSA SCREEN - PCR (NASAL)    Collection Time: 04/29/22  3:45 AM   Result Value Ref Range    MRSA by PCR, Nasal Not Detected Not Detected           Assessment:   Hypertension. Diabetes mellitus type 2. History of stroke in 2015 and has a left-sided facial droop. Fracture of the right hip. Patient has no symptoms of chest pain or angina or no documented heart condition. 1.         Plan:   I will check echocardiogram when available. Probably need to proceed with scheduled surgery as this is not an elective procedure and the patient does not have obvious symptoms of coronary problem. However she can mask symptoms of her coronary disease because of her minimal activity. We will follow along with you perioperatively. Thank you.   1.       []        High complexity decision making was performed    Joseph Gilmore MD

## 2022-04-29 NOTE — ANESTHESIA POSTPROCEDURE EVALUATION
Procedure(s):  RIGHT HIP HEMIARTHROPLASTY.     general    Anesthesia Post Evaluation      Multimodal analgesia: multimodal analgesia used between 6 hours prior to anesthesia start to PACU discharge  Patient location during evaluation: PACU  Patient participation: complete - patient participated  Level of consciousness: awake  Pain score: 0  Pain management: adequate  Airway patency: patent  Anesthetic complications: no  Cardiovascular status: acceptable  Respiratory status: acceptable  Hydration status: acceptable  Post anesthesia nausea and vomiting:  controlled  Final Post Anesthesia Temperature Assessment:  Normothermia (36.0-37.5 degrees C)      INITIAL Post-op Vital signs:   Vitals Value Taken Time   /92 04/29/22 1944   Temp 36.3 °C (97.3 °F) 04/29/22 1944   Pulse 94 04/29/22 1944   Resp 17 04/29/22 1944   SpO2 99 % 04/29/22 1944

## 2022-04-29 NOTE — PROGRESS NOTES
Spiritual Care Assessment/Progress Note  [facility]      NAME: Lenard Sheikh      MRN: [id number]  AGE: 80 y.o.  SEX: female  Yazdanism Affiliation: Church   Language: English     4/29/2022     Total Time (in minutes): 17     Spiritual Assessment begun in [facility] through conversation with:         [x]Patient        [] Family    [] Friend(s)        Reason for Consult: Request by patient     Spiritual beliefs: (Please include comment if needed)     [x] Identifies with a calvin tradition:  Ronald       [] Supported by a calvin community:            [] Claims no spiritual orientation:           [] Seeking spiritual identity:                [] Adheres to an individual form of spirituality:           [] Not able to assess:                           Identified resources for coping:      [] Prayer                               [] Music                  [] Guided Imagery     [x] Family/friends                 [] Pet visits     [] Devotional reading                         [] Unknown     [] Other:                                               Interventions offered during this visit: (See comments for more details)    Patient Interventions: Affirmation of emotions/emotional suffering,Affirmation of calvin,Bridging,Catharsis/review of pertinent events in supportive environment,Coping skills reviewed/reinforced,Iconic (affirming the presence of God/Higher Power),Initial/Spiritual assessment, patient floor,Life review/legacy,Normalization of emotional/spiritual concerns,Yazdanism beliefs/image of God discussed           Plan of Care:     [] Support spiritual and/or cultural needs    [] Support AMD and/or advance care planning process      [] Support grieving process   [] Coordinate Rites and/or Rituals    [] Coordination with community clergy   [] No spiritual needs identified at this time   [] Detailed Plan of Care below (See Comments)  [] Make referral to Music Therapy  [] Make referral to Pet Therapy     [] Make referral to Addiction services  [] Make referral to [facility]  [] Make referral to Spiritual Care Partner  [] No future visits requested        [x] Contact Spiritual Care for further referrals     Comments:  Visited patient in [facility] per patient request.  Patient was alone during the visit. Patient shared about recent events which led to hospitalization which seemed to accept. Shared about her life which were spent in this area, her marriage of over [address] years and changes she has seen over the years. Stated she has support of her relatives not too far from her home. Expressed her Erven Cheers calvin, her long life and ready for what follows. Provided chaplaincy education and supportive presence while exploring her needs and resources. Facilitated storytelling and normalized her emotions and experiences. Affirmed her gratitude for life, health care needs, supportive relationships and trust in God. Advised of  availability. Contact chaplains for further referrals. Chaplain Darren Del Castillo M.Div.    can be reached by calling the  at [facility]  [phone]

## 2022-04-29 NOTE — CONSULTS
Consult    Patient: Jhon Saenz MRN: [id number]  SSN: [id number]    YOB: 1923  Age: 80 y.o. Sex: female      Subjective:      Jhon Saenz is a 80 y.o. female who is being seen for right femoral neck fracture. Patient ambulates using a walker and noticed that she had some increasing pain in her right hip 2 days ago when getting up from the chair. Of note patient denies any significant falls from the time of onset of pain 2 days ago today. She was seen in the emergency department at that time and CT scan read was negative for a displaced femoral neck fracture. Today she presents to the ER with inability to bear weight right lower extremity after hitting her right leg accidentally onto her left leg. CT scan revealed displaced femoral neck fracture and orthopedics was consulted consulted for the same. Patient denies any chest pain shortness of breath or loss of consciousness at the time of the injury or currently. Her nephew is present bedside. Clinical comorbidities are listed as diabetes mellitus type 2, hypertension and osteoporosis. Of note patient had a left hip hemiarthroplasty performed in the past with good success.  Patient was evaluated in ER with her nephew bedside   Past Medical History:   Diagnosis Date    DM (diabetes mellitus) ([address]     High cholesterol     History of multiple allergies     HTN (hypertension)     Hypertension     Osteoporosis     Type 2 diabetes mellitus ([address]      Past Surgical History:   Procedure Laterality Date    HX APPENDECTOMY      HX HIP ARTHROSCOPY      HX HIP REPLACEMENT      HX HYSTERECTOMY      HX KNEE ARTHROSCOPY        Family History   Problem Relation Age of Onset    Diabetes Mother     Hypertension Mother     Osteoporosis Sister      Social History     Tobacco Use    Smoking status: Never Smoker    Smokeless tobacco: Never Used   Substance Use Topics    Alcohol use: Never      Current Facility-Administered Medications   Medication Dose Route Frequency Provider Last Rate Last Admin    0.9% sodium chloride infusion  75 mL/hr IntraVENous CONTINUOUS Kaylee Monroe MD 75 mL/hr at 04/28/22 1933 75 mL/hr at 04/28/22 1933    traMADoL (ULTRAM) tablet 50 mg  50 mg Oral Q6H PRN Kaylee Monroe MD        ondansetron [facility]) injection 4 mg  4 mg IntraVENous Q4H PRN Kaylee Monroe MD            No Known Allergies    Review of Systems:  A comprehensive review of systems was negative except for that written in the History of Present Illness. Objective:     Vitals:    04/28/22 1640 04/28/22 2109 04/28/22 2255 04/29/22 0330   BP: (!) 164/68 133/66 (!) 148/70 (!) 159/63   Pulse: 100 91 88 83   Resp: 17 18 18 20   Temp: 98.8 °F (37.1 °C)  98.5 °F (36.9 °C) 98.9 °F (37.2 °C)   SpO2: 94% 92% 93% 94%   Weight: 56.7 kg (125 lb)      Height: 5' 4\" (1.626 m)           Physical Exam:  General:  Alert, cooperative, no distress, appears stated age. Eyes:  Conjunctivae/corneas clear. PERRL, EOMs intact. Fundi benign   Ears:  Normal TMs and external ear canals both ears. Nose: Nares normal. Septum midline. Mucosa normal. No drainage or sinus tenderness. Mouth/Throat: Lips, mucosa, and tongue normal. Teeth and gums normal.   Neck: Supple, symmetrical, trachea midline, no adenopathy, thyroid: no enlargment/tenderness/nodules, no carotid bruit and no JVD. Back:   Symmetric, no curvature. ROM normal. No CVA tenderness. Lungs:   Clear to auscultation bilaterally. Heart:  Regular rate and rhythm, S1, S2 normal, no murmur, click, rub or gallop. Abdomen:   Soft, non-tender. Bowel sounds normal. No masses,  No organomegaly. Extremities:  Right lower extremity: Shortened and externally rotated. Range of motion of the hip not tested. Dorsalis pedis 2+. Compartment soft nontender. Pulses: 2+ and symmetric all extremities.    Skin: Skin color, texture, turgor normal. No rashes or lesions   Lymph nodes: Cervical, supraclavicular, and axillary nodes normal.   Neurologic: CNII-XII intact. Normal strength, sensation and reflexes throughout. Assessment:     Hospital Problems  Date Reviewed: 2/2/2022          Codes Class Noted POA    Hip fracture [facility]) ICD-10-CM: W43.894F  ICD-9-CM: 820.8  4/28/2022 Unknown            CT Results  (Last 48 hours)               04/28/22 1755  CT PELV WO CONT Final result    Impression:  1. There is an acute displaced mid (transcervical) fracture of the right   femoral neck. 2.  There are older fractures involving the right superior and ischiopubic rami. 3.  The patient status post prior placement of a left bipolar hip prosthesis. Please see the above text for further details. Narrative: This study is a CT evaluation of the pelvis dated 4/28/2022. HISTORY: Trauma. TECHNIQUE: 3 mm axial imaging was acquired through the pelvis. From the axial   imaging sequence sagittal and coronal reconstructed imaging is submitted for   interpretation. Dose Reduction Technique was employed to reduce radiation exposure - This   includes reduction optimization techniques as appropriate to a performed exam   with automated exposure control adjustments of the mA and/or Kv according to   patient size, or use of iterative reconstruction technique. FINDINGS: There is an acute fracture through the right femoral neck. The   fracture is demonstrated within the transcervical region of the right femoral   neck. This represents a displaced transcervical femoral neck fracture with   anterior angulation of the apex of the fracture line within the femoral neck and   approximately one half shaft width anterior displacement of the distal femoral   fracture fragment. The patient is status post prior placement of a left hip prosthesis. There are older fractures involving the right superior and ischiopubic rami. This examination is negative for acute pelvic fractures. There is degenerative disc disease demonstrated along the lower lumbosacral   spine as well as facet joint bony hypertrophy. Imaging with the soft tissue window algorithm is negative for a hematoma about   the pelvic region. There is a pessary device in place along the neck and floor   of the bladder. There are diverticula of the sigmoid colon without active   diverticulitis. There are 2 fluid density cysts projecting from the lower pole   of the left kidney. Plan:     60-year-old active female with a displaced femoral neck fracture on the right hip secondary to a trivial injury. Clinical picture is suggestive of a nondisplaced fracture with recent trauma in the past 2 weeks that was exacerbated with a repeat injury today when she hit her right leg to the left leg. CT scan reveals displaced femoral neck fracture. I reviewed the role of hemiarthroplasty based on the displaced nature of the fracture. Patient acknowledges understanding of the procedure based on her injury to the contralateral side few years ago and is agreeable to proceed with surgery at this time. Inherent risk of dislocation, periprosthetic fracture, DVT and pulmonary embolism reviewed with the patient and her nephew. Alternative of nonoperative management has inherent risk of bedsores, DVT and pulmonary embolism and pneumonia. Patient acknowledges understanding and freely consents for the procedure. We will proceed with surgery in the a.m. pending medical evaluation and OR availability. We will also obtain baseline x-rays of her right femur and AP pelvis and hip for preoperative planning. Signed By: Nam Bridges MD     April 29, 2022                                                Displaced right femoral neck  Fracture in a 80 y.o female who ambulates with walker. Recommend right hip hemiarthroplasty pending medical evaluation.      NPO past midnight

## 2022-04-29 NOTE — ANESTHESIA PREPROCEDURE EVALUATION
Relevant Problems   ENDOCRINE   (+) Diabetes mellitus ([facility] [address]       Anesthetic History   No history of anesthetic complications            Review of Systems / Medical History  Patient summary reviewed, nursing notes reviewed and pertinent labs reviewed    Pulmonary  Within defined limits                 Neuro/Psych   Within defined limits           Cardiovascular    Hypertension          Hyperlipidemia      Comments: 4/29/2022 TTE:    Interpretation Summary         Left Ventricle: Normal left ventricular systolic function with a visually estimated EF of 65 - 70%. Left ventricle size is normal. Normal wall thickness. Findings consistent with concentric remodeling. Normal wall motion. Diastolic dysfunction present with normal LV EF.   Mitral Valve: Mild to moderate regurgitation.   Technical qualifiers: Echo study was a technically difficult Doppler study.        GI/Hepatic/Renal  Within defined limits              Endo/Other    Diabetes: type 2    Arthritis    Comments: 4/28/2022 HIP CT:    Study Result    Narrative & Impression  This study is a CT evaluation of the pelvis dated 4/28/2022.     HISTORY: Trauma.     TECHNIQUE: 3 mm axial imaging was acquired through the pelvis. From the axial  imaging sequence sagittal and coronal reconstructed imaging is submitted for  interpretation.     Dose Reduction Technique was employed to reduce radiation exposure - This  includes reduction optimization techniques as appropriate to a performed exam  with automated exposure control adjustments of the mA and/or Kv according to  patient size, or use of iterative reconstruction technique.     FINDINGS: There is an acute fracture through the right femoral neck. The  fracture is demonstrated within the transcervical region of the right femoral  neck.  This represents a displaced transcervical femoral neck fracture with  anterior angulation of the apex of the fracture line within the femoral neck and  approximately one half shaft width anterior displacement of the distal femoral  fracture fragment.     The patient is status post prior placement of a left hip prosthesis.     There are older fractures involving the right superior and ischiopubic rami. This examination is negative for acute pelvic fractures.     There is degenerative disc disease demonstrated along the lower lumbosacral  spine as well as facet joint bony hypertrophy.     Imaging with the soft tissue window algorithm is negative for a hematoma about  the pelvic region. There is a pessary device in place along the neck and floor  of the bladder. There are diverticula of the sigmoid colon without active  diverticulitis. There are 2 fluid density cysts projecting from the lower pole  of the left kidney.     IMPRESSION  1. There is an acute displaced mid (transcervical) fracture of the right  femoral neck. 2.  There are older fractures involving the right superior and ischiopubic rami. 3.  The patient status post prior placement of a left bipolar hip prosthesis. Please see the above text for further details. Other Findings   Comments: Procedure Information    Case: [id number] Anesthesia Start Date/Time: 04/29/22 3886  Procedure: RIGHT HIP HEMIARTHROPLASTY (Right ) - DEPUY   C-ARM  Anesthesia type: general  Pre-op diagnosis: RIGHT FEMORAL NECK FRACTURE  Location: [facility] MAIN OR 07 / [facility] MAIN OR  Surgeons: Hiral Beaver MD             Physical Exam    Airway  Mallampati: II  TM Distance: 4 - 6 cm  Neck ROM: normal range of motion   Mouth opening: Normal     Cardiovascular    Rhythm: regular  Rate: normal         Dental  No notable dental hx       Pulmonary  Breath sounds clear to auscultation              Comments: 4/28/2022 CXR:    Study Result    Narrative & Impression  History: Chest pain     Single view of the chest was obtained. Heart size is in the upper limits of  normal. Lungs are clear. Bony structures appear osteopenic.  No effusions or  pneumothorax identified.     IMPRESSION  1. IMPRESSION: No acute abnormality identified   Abdominal  GI exam deferred       Other Findings   Comments: Results for Sandy Mello (MRN [id number]) as of 4/29/2022 16:40    4/28/2022 19:32  WBC: 7.9  NRBC: 0.0  RBC: 4.25  HGB: 12.6  HCT: 38.8  MCV: 91.3  MCH: 29.6  MCHC: 32.5  RDW: 13.9  PLATELET: 996      Results for Sandy Mello (MRN [id number]) as of 4/29/2022 16:40    4/28/2022 19:32  INR: 0.9  Prothrombin time: 12.2      Results for Sandy Mello (MRN [id number]) as of 4/29/2022 16:40    4/28/2022 19:32  Sodium: 137  Potassium: 3.7  Chloride: 102  CO2: 29  Anion gap: 6  Glucose: 254 (H)  BUN: 27 (H)  Creatinine: 0.89  BUN/Creatinine ratio: 30 (H)  Calcium: 9.4  GFR est non-AA: 59 (L)  GFR est AA: >60  Bilirubin, total: 0.3  Protein, total: 7.3  Albumin: 3.4 (L)  Globulin: 3.9  A-G Ratio: 0.9 (L)  ALT: 21  AST: 14 (L)  Alk.  phosphatase: 62      Component 4/28/22 2019  Crossmatch Expiration 05/01/2022,2351   ABO/Rh(D) A Negative   Antibody screen Negative               Anesthetic Plan    ASA: 3  Anesthesia type: general    Monitoring Plan: Arterial line      Induction: Intravenous  Anesthetic plan and risks discussed with: Patient

## 2022-04-29 NOTE — ROUTINE PROCESS
Lonnie Campoverde TRANSFER - OUT REPORT:    Verbal report given to TESS Bahena on King Leeann  being transferred to / 205 for routine progression of care       Report consisted of patients Situation, Background, Assessment and   Recommendations(SBAR). Information from the following report(s) SBAR, ED Summary and MAR was reviewed with the receiving nurse. Lines:   Peripheral IV 04/28/22 Anterior;Left;Proximal Forearm (Active)        Opportunity for questions and clarification was provided.       Patient transported with:   [employer]

## 2022-04-30 LAB
ALBUMIN SERPL-MCNC: 2.8 G/DL (ref 3.5–5)
ALBUMIN/GLOB SERPL: 1 {RATIO} (ref 1.1–2.2)
ALP SERPL-CCNC: 54 U/L (ref 45–117)
ALT SERPL-CCNC: 17 U/L (ref 12–78)
ANION GAP SERPL CALC-SCNC: 7 MMOL/L (ref 5–15)
AST SERPL W P-5'-P-CCNC: 24 U/L (ref 15–37)
ATRIAL RATE: 84 BPM
ATRIAL RATE: 91 BPM
ATRIAL RATE: 94 BPM
BASOPHILS # BLD: 0 K/UL (ref 0–0.1)
BASOPHILS NFR BLD: 0 % (ref 0–1)
BILIRUB SERPL-MCNC: 0.3 MG/DL (ref 0.2–1)
BUN SERPL-MCNC: 17 MG/DL (ref 6–20)
BUN/CREAT SERPL: 29 (ref 12–20)
CA-I BLD-MCNC: 7.7 MG/DL (ref 8.5–10.1)
CALCULATED P AXIS, ECG09: 50 DEGREES
CALCULATED P AXIS, ECG09: 63 DEGREES
CALCULATED P AXIS, ECG09: 72 DEGREES
CALCULATED R AXIS, ECG10: 45 DEGREES
CALCULATED R AXIS, ECG10: 55 DEGREES
CALCULATED R AXIS, ECG10: 64 DEGREES
CALCULATED T AXIS, ECG11: 30 DEGREES
CALCULATED T AXIS, ECG11: 58 DEGREES
CALCULATED T AXIS, ECG11: 66 DEGREES
CHLORIDE SERPL-SCNC: 111 MMOL/L (ref 97–108)
CO2 SERPL-SCNC: 26 MMOL/L (ref 21–32)
CREAT SERPL-MCNC: 0.59 MG/DL (ref 0.55–1.02)
DIAGNOSIS, 93000: NORMAL
DIFFERENTIAL METHOD BLD: ABNORMAL
EOSINOPHIL # BLD: 0 K/UL (ref 0–0.4)
EOSINOPHIL NFR BLD: 0 % (ref 0–7)
ERYTHROCYTE [DISTWIDTH] IN BLOOD BY AUTOMATED COUNT: 13.9 % (ref 11.5–14.5)
GLOBULIN SER CALC-MCNC: 2.7 G/DL (ref 2–4)
GLUCOSE SERPL-MCNC: 313 MG/DL (ref 65–100)
HCT VFR BLD AUTO: 33.3 % (ref 35–47)
HGB BLD-MCNC: 10.5 G/DL (ref 11.5–16)
IMM GRANULOCYTES # BLD AUTO: 0 K/UL (ref 0–0.04)
IMM GRANULOCYTES NFR BLD AUTO: 0 % (ref 0–0.5)
LYMPHOCYTES # BLD: 0.5 K/UL (ref 0.8–3.5)
LYMPHOCYTES NFR BLD: 7 % (ref 12–49)
MCH RBC QN AUTO: 29.2 PG (ref 26–34)
MCHC RBC AUTO-ENTMCNC: 31.5 G/DL (ref 30–36.5)
MCV RBC AUTO: 92.5 FL (ref 80–99)
MONOCYTES # BLD: 0.5 K/UL (ref 0–1)
MONOCYTES NFR BLD: 7 % (ref 5–13)
NEUTS SEG # BLD: 6.5 K/UL (ref 1.8–8)
NEUTS SEG NFR BLD: 86 % (ref 32–75)
NRBC # BLD: 0 K/UL (ref 0–0.01)
NRBC BLD-RTO: 0 PER 100 WBC
P-R INTERVAL, ECG05: 148 MS
P-R INTERVAL, ECG05: 160 MS
P-R INTERVAL, ECG05: 166 MS
PLATELET # BLD AUTO: 174 K/UL (ref 150–400)
PMV BLD AUTO: 11.3 FL (ref 8.9–12.9)
POTASSIUM SERPL-SCNC: 3.7 MMOL/L (ref 3.5–5.1)
PROT SERPL-MCNC: 5.5 G/DL (ref 6.4–8.2)
Q-T INTERVAL, ECG07: 368 MS
Q-T INTERVAL, ECG07: 414 MS
Q-T INTERVAL, ECG07: 424 MS
QRS DURATION, ECG06: 78 MS
QRS DURATION, ECG06: 80 MS
QRS DURATION, ECG06: 84 MS
QTC CALCULATION (BEZET), ECG08: 460 MS
QTC CALCULATION (BEZET), ECG08: 501 MS
QTC CALCULATION (BEZET), ECG08: 509 MS
RBC # BLD AUTO: 3.6 M/UL (ref 3.8–5.2)
SODIUM SERPL-SCNC: 144 MMOL/L (ref 136–145)
VENTRICULAR RATE, ECG03: 84 BPM
VENTRICULAR RATE, ECG03: 91 BPM
VENTRICULAR RATE, ECG03: 94 BPM
WBC # BLD AUTO: 7.6 K/UL (ref 3.6–11)

## 2022-04-30 PROCEDURE — 74011250636 HC RX REV CODE- 250/636: Performed by: ORTHOPAEDIC SURGERY

## 2022-04-30 PROCEDURE — 80053 COMPREHEN METABOLIC PANEL: CPT

## 2022-04-30 PROCEDURE — 77010033678 HC OXYGEN DAILY

## 2022-04-30 PROCEDURE — 97161 PT EVAL LOW COMPLEX 20 MIN: CPT

## 2022-04-30 PROCEDURE — 65270000029 HC RM PRIVATE

## 2022-04-30 PROCEDURE — 85025 COMPLETE CBC W/AUTO DIFF WBC: CPT

## 2022-04-30 PROCEDURE — 97530 THERAPEUTIC ACTIVITIES: CPT

## 2022-04-30 PROCEDURE — 93005 ELECTROCARDIOGRAM TRACING: CPT

## 2022-04-30 PROCEDURE — 97165 OT EVAL LOW COMPLEX 30 MIN: CPT

## 2022-04-30 PROCEDURE — 74011250637 HC RX REV CODE- 250/637: Performed by: ORTHOPAEDIC SURGERY

## 2022-04-30 PROCEDURE — 94761 N-INVAS EAR/PLS OXIMETRY MLT: CPT

## 2022-04-30 PROCEDURE — 74011000250 HC RX REV CODE- 250: Performed by: ORTHOPAEDIC SURGERY

## 2022-04-30 PROCEDURE — 36415 COLL VENOUS BLD VENIPUNCTURE: CPT

## 2022-04-30 RX ADMIN — AMLODIPINE BESYLATE 10 MG: 5 TABLET ORAL at 10:17

## 2022-04-30 RX ADMIN — CEFAZOLIN SODIUM 2 G: 1 INJECTION, POWDER, FOR SOLUTION INTRAMUSCULAR; INTRAVENOUS at 03:47

## 2022-04-30 RX ADMIN — SODIUM CHLORIDE 125 ML/HR: 9 INJECTION, SOLUTION INTRAVENOUS at 05:58

## 2022-04-30 RX ADMIN — ACETAMINOPHEN 650 MG: 325 TABLET ORAL at 12:45

## 2022-04-30 RX ADMIN — ACETAMINOPHEN 650 MG: 325 TABLET ORAL at 23:11

## 2022-04-30 RX ADMIN — PREGABALIN 50 MG: 50 CAPSULE ORAL at 20:29

## 2022-04-30 RX ADMIN — PANTOPRAZOLE SODIUM 20 MG: 20 TABLET, DELAYED RELEASE ORAL at 06:00

## 2022-04-30 RX ADMIN — ACETAMINOPHEN 650 MG: 325 TABLET ORAL at 17:23

## 2022-04-30 RX ADMIN — SENNOSIDES AND DOCUSATE SODIUM 1 TABLET: 50; 8.6 TABLET ORAL at 20:29

## 2022-04-30 RX ADMIN — POLYETHYLENE GLYCOL 3350 17 G: 17 POWDER, FOR SOLUTION ORAL at 10:17

## 2022-04-30 RX ADMIN — SODIUM CHLORIDE, PRESERVATIVE FREE 10 ML: 5 INJECTION INTRAVENOUS at 06:00

## 2022-04-30 RX ADMIN — SODIUM CHLORIDE, PRESERVATIVE FREE 10 ML: 5 INJECTION INTRAVENOUS at 17:28

## 2022-04-30 RX ADMIN — METFORMIN HYDROCHLORIDE 500 MG: 500 TABLET ORAL at 17:28

## 2022-04-30 RX ADMIN — TRAMADOL HYDROCHLORIDE 50 MG: 50 TABLET, COATED ORAL at 23:11

## 2022-04-30 RX ADMIN — ACETAMINOPHEN 650 MG: 325 TABLET ORAL at 05:59

## 2022-04-30 RX ADMIN — Medication 1 TABLET: at 10:17

## 2022-04-30 RX ADMIN — KETOCONAZOLE: 20 CREAM TOPICAL at 17:28

## 2022-04-30 RX ADMIN — ALOGLIPTIN 12.5 MG: 12.5 TABLET, FILM COATED ORAL at 10:18

## 2022-04-30 RX ADMIN — METFORMIN HYDROCHLORIDE 500 MG: 500 TABLET ORAL at 10:17

## 2022-04-30 RX ADMIN — ACETAMINOPHEN 650 MG: 325 TABLET ORAL at 00:39

## 2022-04-30 RX ADMIN — SODIUM CHLORIDE, PRESERVATIVE FREE 10 ML: 5 INJECTION INTRAVENOUS at 20:32

## 2022-04-30 RX ADMIN — ATORVASTATIN CALCIUM 20 MG: 20 TABLET, FILM COATED ORAL at 10:18

## 2022-04-30 RX ADMIN — PREGABALIN 50 MG: 50 CAPSULE ORAL at 10:17

## 2022-04-30 RX ADMIN — DOCUSATE SODIUM 100 MG: 100 CAPSULE, LIQUID FILLED ORAL at 10:18

## 2022-04-30 RX ADMIN — SENNOSIDES AND DOCUSATE SODIUM 1 TABLET: 50; 8.6 TABLET ORAL at 10:17

## 2022-04-30 NOTE — PROGRESS NOTES
Problem: Pain  Goal: *Control of Pain  Outcome: Progressing Towards Goal  Note: Patient is reporting that her pain is being controlled at this time. Pain will continue to be assessed and treated as needed. Bedside shift change report given to Katie Norton RN (oncoming nurse) by Armando Gan RN (offgoing nurse). Report included the following information SBAR, Kardex, ED Summary and Procedure Summary, Med rec. Kraig Batista

## 2022-04-30 NOTE — PERIOP NOTES
Pt was incontinent of urine while in pre-op holding. Pt's linen and gown changed and skin care provided.

## 2022-04-30 NOTE — PROGRESS NOTES
PHYSICAL THERAPY EVALUATION  Patient: King Leeann (62 y.o. female)  Date: 4/30/2022  Primary Diagnosis: Hip fracture ([facility] [address] [S72.009A]  Procedure(s) (LRB):  RIGHT HIP HEMIARTHROPLASTY (Right) 1 Day Post-Op   Precautions: falls, WBAT RLE       ASSESSMENT  Pt is a 81 yo female admitted on 4/28/2022 for c/o R hip pain and inability to bear weight through R LE. Per chart, pt denied any signifcant falls since onset of pain 2 days PTA. She reportedly was seen in ED at that time and CT scan was (-) for any fracture. CT pelvis 4/28 showing acute displaced mid (transcervical) fracture of the right femoral neck and older fractures involving the right superior and ischiopubic rami. Pt admitted 4/28/22 and being treated for R femoral neck fx; seen by orthopedics and is s/p R hip hemiarthoplasty on 4/29/22 with Dr. Caroline Keene. Pt with orders to be WBAT to R LE and is to be adhering to posterior hip precautions. PMH: DM, HTN, osteoporosis, and hx L hip hemiarthoplasty . Pt semi-supine in bed upon PT/OT arrival, agreeable to evaluation. Pt A&O x 4. Per pt report pt resides alone at [facility], 0 KETTY, pt was I for ADLS/IADLS, rollator with mobility prior to admission. DME: shower chair, grab bars, SPC, and rollator. Based on the objective data described below, the patient presents with generalized weakness, impaired functional mobility, impaired amb, impaired balance, and decreased activity tolerance. Pt required min with additional time for bed mobility, min A with additional time supine <> sit transfers. Pt then performed self care and donning socks with OT while EOB (see OT note for details). Pt educated in VARKAUS and posterior hip precautions, verbalized good understanding regarding precautions and WB status. Pt denied dizziness and sig pain in right hip while seated EOB and was agreeable to bed to chair transfer. Pt required min A x 2 with additional time sit <> stand transfers.  Pt amb 3 feet with gt belt, RW, and min A with additional time requiring verbal cues for safety and sequencing with RW; demonstrating NBOS with WS to left, short, shuffling, step to gt pattern with no LOB or knee buckling noted. Pt did well with session today with minimal c/o pain with mobility, good adherence to precautions, as well as good motivation and desire. Pt will benefit from continued skilled PT to address above deficits and return to PLOF. Current PT DC recommendation IRF due to pt being I at baseline with excellent desire and motivation to return to PLOF. Current Level of Function Impacting Discharge (mobility/balance): min A with additional time to min A x2    Other factors to consider for discharge: family/social support, DME, time since onset, severity of deficits, decline from functional baseline     PLAN :  Recommendations and Planned Interventions: bed mobility training, transfer training, gait training, therapeutic exercises, neuromuscular re-education, patient and family training/education and therapeutic activities      Frequency/Duration: Patient will be followed by physical therapy:  3-5x/week to address goals. Recommendation for discharge: (in order for the patient to meet his/her long term goals)  [address] [facility]     This discharge recommendation:  Has been made in collaboration with the attending provider and/or case management         SUBJECTIVE:   Patient stated my hip feels much better.     OBJECTIVE DATA SUMMARY:   HISTORY:    Past Medical History:   Diagnosis Date    DM [facility]diabetes mellitus) ([facility] [address]     High cholesterol     History of multiple allergies     HTN (hypertension)     Hypertension     Osteoporosis     Type 2 diabetes mellitus ([facility] [address]      Past Surgical History:   Procedure Laterality Date    HX APPENDECTOMY      HX HIP ARTHROSCOPY      HX HIP REPLACEMENT      HX HYSTERECTOMY      HX KNEE ARTHROSCOPY         Home Situation  [address] Environment[address] Name: [facility] [facility]  # Steps to Enter: 0  One/Two Story Residence: One story  Living Alone: Yes  Support Systems: Assisted Living  Patient Expects to be Discharged to[de-identified] Home with home health  Current DME Used/Available at Home: [address], [address], straight,Walker, rollator,Grab bars,Shower chair  Tub or Shower Type: Shower    EXAMINATION/PRESENTATION/DECISION MAKING:   Critical Behavior:  Neurologic State: Alert  Orientation Level: Oriented X4  Cognition: Follows commands     Hearing: Auditory  Auditory Impairment: Hard of hearing, bilateral  Skin:  Intact where visible  Edema: localized right hip, ice applied to area following transfer to chair  Range Of Motion:  AROM: Generally decreased, functional           PROM: Generally decreased, functional           Strength:    Strength: Generally decreased, functional        Functional Mobility:  Bed Mobility:  Rolling: Minimum assistance; Additional time  Supine to Sit: Moderate assistance; Additional time     Scooting: Minimum assistance; Moderate assistance  Transfers:  Sit to Stand: Minimum assistance; Additional time;Assist x2  Stand to Sit: Minimum assistance; Additional time                       Balance:   Sitting: Intact; With support  Standing: Impaired; Without support  Standing - Static: Fair;Constant support  Standing - Dynamic : Poor;Constant support  Ambulation/Gait Training:  Distance (ft): 3 Feet (ft) (bed>chair)  Assistive Device: Gait belt;Walker, rolling  Ambulation - Level of Assistance: Minimal assistance;Assist x2; Additional time     Gait Description (WDL): Exceptions to WDL     Right Side Weight Bearing: As tolerated     Base of Support: Narrowed; Shift to left     Speed/Shavon: Slow;Shuffled    Therapeutic Exercises: Ankle pumps and hip abduction 5x right LE    Functional Measure:  [address] [facility] [address] [address] AM-PAC 6 Clicks         Basic Mobility Inpatient Short Form  How much difficulty does the patient currently have. .. Unable A Lot A Little None   1.   Turning over in bed (including adjusting bedclothes, sheets and blankets)? [] 1   [x] 2   [] 3   [] 4   2. Sitting down on and standing up from a chair with arms ( e.g., wheelchair, bedside commode, etc.)   [] 1   [x] 2   [] 3   [] 4   3. Moving from lying on back to sitting on the side of the bed? [] 1   [x] 2   [] 3   [] 4          How much help from another person does the patient currently need. .. Total A Lot A Little None   4. Moving to and from a bed to a chair (including a wheelchair)? [] 1   [x] 2   [] 3   [] 4   5. Need to walk in hospital room? [] 1   [x] 2   [] 3   [] 4   6. Climbing 3-5 steps with a railing? [x] 1   [] 2   [] 3   [] 4   © 2007, Trustees of [address] [place] [address], under license to [employer]. All rights reserved     Score:  Initial: 11/24 Most Recent: X (Date: 4/30/22 )   Interpretation of Tool:  Represents activities that are increasingly more difficult (i.e. Bed mobility, Transfers, Gait). Score 24 23 22-20 19-15 14-10 9-7 6   Modifier CH CI CJ CK CL CM CN         Physical Therapy Evaluation Charge Determination   History Examination Presentation Decision-Making   HIGH Complexity :3+ comorbidities / personal factors will impact the outcome/ POC  HIGH Complexity : 4+ Standardized tests and measures addressing body structure, function, activity limitation and / or participation in recreation  LOW Complexity : Stable, uncomplicated  Other outcome measures ampac 6  mod      Based on the above components, the patient evaluation is determined to be of the following complexity level: LOW     Pain Rating:  3-4/10 right hip    Activity Tolerance:   Good    After treatment patient left in no apparent distress:   Sitting in chair and Call bell within reach and nsg updated. GOALS:    Problem: Mobility Impaired (Adult and Pediatric)  Goal: *Acute Goals and Plan of Care (Insert Text)  Description: Pt stated goal: to get better  Pt will be I with LE HEP in 7 days.   Pt will perform bed mobility with mod I in 7 days. Pt will perform transfers with mod I in 7 days. Pt will amb 25-50 feet with LRAD safely with mod I in 7 days. Pt will verbalize and demonstrate compliance with posterior hip precautions to include no abduction, no hip flexion >90 deg, and no IR of right hip in 7 days. Outcome: Not Met       COMMUNICATION/EDUCATION:   The patients plan of care was discussed with: Occupational therapist, Registered nurse, and Case management. Fall prevention education was provided and the patient/caregiver indicated understanding., Patient/family have participated as able in goal setting and plan of care. , and Patient/family agree to work toward stated goals and plan of care. PT/OT sessions occurred together for increased safety of pt and clinician.        Thank you for this referral.  Ivan Gilmore, PT, DPT   Time Calculation: 33 mins

## 2022-04-30 NOTE — PROGRESS NOTES
Patient alert and oriented but [place] tolerated all meds as ordered OOB with PT to bedside chair.   Call bell in reach no s/s of pain discomfort or distress

## 2022-04-30 NOTE — PROGRESS NOTES
Progress Note  Date:2022       Room:[facility]  Patient Name:Collette Cleary     YOB: 1923     Age:89 y.o. Subjective    Subjective   Covering for   Chart reviewed and patient examined  Fracture right hip status post right hip hemiarthroplasty with some postoperative pain  Patient sitting in chair in no acute distress claimed feeling better  Denies any shortness of breath chest pain  Available lab and test reviewed  Patient condition progress discharge plan discussed with the family member    Review of Systems   Unremarkable other than mentioned in subjective  Objective         Vitals Last 24 Hours:  TEMPERATURE:  Temp  Av.7 °F (36.5 °C)  Min: 97.2 °F (36.2 °C)  Max: 98.1 °F (36.7 °C)  RESPIRATIONS RANGE: Resp  Av.1  Min: 14  Max: 19  PULSE OXIMETRY RANGE: SpO2  Av.4 %  Min: 93 %  Max: 99 %  PULSE RANGE: Pulse  Av.5  Min: 86  Max: 108  BLOOD PRESSURE RANGE: Systolic (45WGS), ZMT:581 , Min:122 , IHE:083   ; Diastolic (50WAU), LWL:51, Min:52, Max:92      I/O (24Hr):     Intake/Output Summary (Last 24 hours) at 2022 1422  Last data filed at 2022 1215  Gross per 24 hour   Intake 4380 ml   Output 3175 ml   Net 1205 ml     Current Facility-Administered Medications:     amLODIPine (NORVASC) tablet 10 mg, 10 mg, Oral, DAILY, Anam Grady MD, 10 mg at 22 1017    atorvastatin (LIPITOR) tablet 20 mg, 20 mg, Oral, DAILY, Mustapha Grady MD, 20 mg at 22 1018    calcium-vitamin D 600 mg-5 mcg (200 unit) per tablet 1 Tablet, 1 Tablet, Oral, DAILY, Mustapha Grady MD, 1 Tablet at 22 1017    docusate sodium (COLACE) capsule 100 mg, 100 mg, Oral, DAILY, Tulio ROGERS MD, 100 mg at 22 1018    pantoprazole (PROTONIX) tablet 20 mg, 20 mg, Oral, ACB, Mustapha Grady MD, 20 mg at 22 0600    metFORMIN (GLUCOPHAGE) tablet 500 mg, 500 mg, Oral, BID WITH MEALS, Mustapha Grady MD, 500 mg at 22 1017    pregabalin (LYRICA) capsule 50 mg, 50 mg, Oral, BID, Hoa Grady MD, 50 mg at 04/30/22 1017    alogliptin (NESINA) tablet 12.5 mg, 12.5 mg, Oral, DAILY, Mustapha Grady MD, 12.5 mg at 04/30/22 1018    acetaminophen (TYLENOL) tablet 500 mg, 500 mg, Oral, Q4H PRN, Magaly Ramos MD    . PHARMACY TO SUBSTITUTE PER PROTOCOL (Reordered from: estradioL (ESTRACE) 0.01 % (0.1 mg/gram) vaginal cream), , , Per Protocol, Ayde Grady MD    ketoconazole (NIZORAL) 2 % cream, , Topical, DAILY, Ayde Grady MD    . PHARMACY TO SUBSTITUTE PER PROTOCOL (Reordered from: medical supply, miscellaneous (OCUSOFT LID SCRUB)), , , Per Protocol, Magaly Ramos MD    0.9% sodium chloride infusion, 125 mL/hr, IntraVENous, CONTINUOUS, yAde Grady MD, Last Rate: 125 mL/hr at 04/30/22 0558, 125 mL/hr at 04/30/22 0558    sodium chloride (NS) flush 5-40 mL, 5-40 mL, IntraVENous, Q8H, Mustapha Grady MD, 10 mL at 04/30/22 0600    sodium chloride (NS) flush 5-40 mL, 5-40 mL, IntraVENous, PRN, Mustapha Grady MD    naloxone (NARCAN) injection 0.4 mg, 0.4 mg, IntraVENous, PRN, Magaly Ramos MD    senna-docusate (PERICOLACE) 8.6-50 mg per tablet 1 Tablet, 1 Tablet, Oral, BID, Eddie ROGERS MD, 1 Tablet at 04/30/22 1017    polyethylene glycol (MIRALAX) packet 17 g, 17 g, Oral, DAILY, Mustapha Grady MD, 17 g at 04/30/22 1017    [START ON 5/1/2022] bisacodyL (DULCOLAX) suppository 10 mg, 10 mg, Rectal, DAILY PRN, Ayde Grady MD    acetaminophen (TYLENOL) tablet 650 mg, 650 mg, Oral, Q6H, Mustapha Grady MD, 650 mg at 04/30/22 1245    traMADoL (ULTRAM) tablet 50 mg, 50 mg, Oral, Q6H PRN, Ayde Grady MD    0.9% sodium chloride infusion, 75 mL/hr, IntraVENous, CONTINUOUS, Mao Britt MD, Stopped at 04/29/22 9184     Objective   General exam: Patient sitting at bedside in chair in no acute distress  HEENT exam pallor plus no icterus  Neck is supple both are well felt  RS equal air entry of both side no rales or rhonchi  CVS regular S1-S2 no gallop murmur  Abdomen soft nontender bowels are active  Extremity no edema peripheral pulse well felt  CNS patient is awake alert. No focal neurodeficit    Labs/Imaging/Diagnostics    Labs:  CBC:  Recent Labs     04/30/22 0619 04/28/22 1932   WBC 7.6 7.9   RBC 3.60* 4.25   HGB 10.5* 12.6   HCT 33.3* 38.8   MCV 92.5 91.3   RDW 13.9 13.9    196     CHEMISTRIES:  Recent Labs     04/30/22 0619 04/28/22 1932    137   K 3.7 3.7   * 102   CO2 26 29   BUN 17 27*   CREA 0.59 0.89   CA 7.7* 9.4   PT/INR:  Recent Labs     04/28/22 1932   INR 0.9     APTT:No results for input(s): APTT in the last 72 hours. LIVER PROFILE:  Recent Labs     04/30/22 0619 04/28/22 1932   AST 24 14*   ALT 17 21     Lab Results   Component Value Date/Time    ALT (SGPT) 17 04/30/2022 06:19 AM    AST (SGOT) 24 04/30/2022 06:19 AM    Alk. phosphatase 54 04/30/2022 06:19 AM    Bilirubin, total 0.3 04/30/2022 06:19 AM       Imaging Last 24 Hours:  XR PELV AP ONLY    Result Date: 4/30/2022  Exam: AP pelvis Comparison: 4/28/2022. 4/26/2022. Findings: Interval hemiprosthetic replacement of the right hip. The prosthetic elements appear to be in satisfactory position. . Bone elements appear to be in near-anatomic alignment without displacement or developing bone lesion. Left hip hemiprosthesis as before. Remote healed right pubic ramus fractures. Apparent satisfactory postoperative appearance.     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    Osteoporosis 03/19/2019    Acquired keratoderma 02/18/2019    Diabetes mellitus ([facility] 02/18/2019    Metatarsalgia 10/02/2018    Cellulitis of foot 03/27/2018  Fracture of pelvis ([facility] [address] 06/20/2017     Assessment & Plan  Fracture right hip status post right hemiarthroplasty  Hypertension  Acute blood loss anemia  Diabetes with hyperglycemia  Hypertension presently stable  History of osteoporosis on Prolia shot    Plan    Continue current postoperative care  Add patient iron supplement  Monitor blood pressure and blood sugar closely  Crease activity with PT  Follow-up lab    Discussed with nurse and multiple family member    Time spent more than 30 minutes            Electronically signed by Abelino Vazquez MD on 4/30/2022 at 2:22 PM

## 2022-04-30 NOTE — PERIOP NOTES
Pt had right wrist A- line present on arrival to PACU. A-line discontinued per protocol by Jarek Johnson RN. Pt had a left lower forearm IV, #20, also present on arrival to PACU, site discontinued by writer, because could not flush.

## 2022-04-30 NOTE — PROGRESS NOTES
Progress Note    Patient: Bryan Ann MRN: [id number]  SSN: [id number]    YOB: 1923  Age: 80 y.o. Sex: female      Admit Date: 4/28/2022    LOS: 2 days     Subjective:     Pt resting in bed comfortably  No chest pain /sob     Objective:     Vitals:    04/30/22 0014 04/30/22 0228 04/30/22 0254 04/30/22 0330   BP: (!) 141/60 (!) 140/63  (!) 141/59   Pulse: 100 (!) 108  91   Resp: 17 16  16   Temp: 97.5 °F (36.4 °C) 98.1 °F (36.7 °C)  97.8 °F (36.6 °C)   SpO2: 99% 96%  96%   Weight:   56.4 kg (124 lb 4.8 oz)    Height:            Intake and Output:  Current Shift: No intake/output data recorded.   Last three shifts: 04/28 1901 - 04/30 0700  In: 4140 [I.V.:4140]  Out: 3775 [Urine:3275]    Physical Exam:   aao x 3   Right LE: Dressing c/d/i  Compartments soft, nontender  DP(+)       Lab/Data Review:  CBC:   Lab Results   Component Value Date/Time    WBC 7.6 04/30/2022 06:19 AM    HGB 10.5 (L) 04/30/2022 06:19 AM    HCT 33.3 (L) 04/30/2022 06:19 AM     04/30/2022 06:19 AM          Assessment:     Active Problems:    Hip fracture ([address] (4/28/2022)        Plan:     Pod#1 S/P  Right hip hemiarthroplasty in 81 y/o female:     -Pain control  -PT OT: Weight-bear as tolerated right lower extremity  -Lovenox for DVT prophylaxis  -Acute blood loss anemia we will monitor  -Leave lawrence dressing on for 7 days and then switch to Silverlon dressing  -DC placement :  will likely need rehab    Signed By: Talia Ngo MD     April 30, 2022

## 2022-04-30 NOTE — ROUTINE PROCESS
TRANSFER - OUT REPORT:    Verbal report given to [place] (name) on Jhon Saenz  being transferred to  (unit) for routine post - op       Report consisted of patients Situation, Background, Assessment and   Recommendations(SBAR). Information from the following report(s) SBAR, OR Summary, Procedure Summary, Intake/Output, Recent Results and Dual Neuro Assessment was reviewed with the receiving nurse. Opportunity for questions and clarification was provided.       Patient transported with:   O2 @ 3 liters  Registered Nurse Huseyin Juarez

## 2022-04-30 NOTE — PROGRESS NOTES
Progress Note      4/30/2022 4:25 PM  NAME: Christa Petersen   MRN:  [id number]   Admit Diagnosis: [facility] fracture ([facility] [S72.009A]      Subjective:   Chart reviewed. Discussed with the caretaker. Patient had a surgery performed. She feels good. Her hip pain is still there but significantly improved. Review of Systems:    Symptom Y/N Comments  Symptom Y/N Comments   Fever/Chills n   Chest Pain n    Poor Appetite    Edema     Cough    Abdominal Pain     Sputum    Joint Pain y  hip pain is better   SOB/CARBONE n   Pruritis/Rash     Nausea/vomit    Other     Diarrhea         Constipation           Could NOT obtain due to:      Objective:       Physical Exam:    Last 24hrs VS reviewed since prior progress note. Most recent are:    Visit Vitals  BP (!) 154/55 (BP 1 Location: Right upper arm, BP Patient Position: At rest)   Pulse 83   Temp 98.3 °F (36.8 °C)   Resp 16   Ht 5' 4\" (1.626 m)   Wt 56.4 kg (124 lb 4.8 oz)   SpO2 96%   Breastfeeding No   BMI 21.34 kg/m²       Intake/Output Summary (Last 24 hours) at 4/30/2022 1625  Last data filed at 4/30/2022 1215  Gross per 24 hour   Intake 4380 ml   Output 3175 ml   Net 1205 ml        General Appearance: Well developed, well nourished, alert & oriented x 3,    no acute distress. Ears/Nose/Mouth/Throat: Hearing grossly normal.  Stigmata of old stroke with the left-sided facial asymmetry  Neck: Supple. Chest: Lungs clear to auscultation bilaterally. Cardiovascular: Regular rate and rhythm, S1,S2 normal, no murmur. Abdomen: Soft, non-tender, bowel sounds are active. Extremities: No edema bilaterally. Skin: Warm and dry. []         Post-cath site without hematoma, bruit, tenderness, or thrill. Distal pulses intact.     PMH/SH reviewed - no change compared to H&P    Data Review    Telemetry: normal sinus rhythm     EKG:   []  No new EKG for review    Lab Data Personally Reviewed:    Recent Labs     04/30/22  0619 04/28/22  1932   WBC 7.6 7.9   HGB 10.5* 12.6   HCT 33.3* 38.8    196     Recent Labs     04/28/22 1932   INR 0.9   PTP 12.2      Recent Labs     04/30/22  0619 04/28/22 1932    137   K 3.7 3.7   * 102   CO2 26 29   BUN 17 27*   CREA 0.59 0.89   * 254*   CA 7.7* 9.4     No results for input(s): CPK, CKNDX, TROIQ in the last 72 hours. No lab exists for component: CPKMB  No results found for: CHOL, CHOLX, CHLST, CHOLV, HDL, HDLP, LDL, LDLC, DLDLP, Dolores Limerick, CHHD, CHHDX    Recent Labs     04/30/22 0619 04/28/22 1932   AP 54 62   TP 5.5* 7.3   ALB 2.8* 3.4*   GLOB 2.7 3.9     No results for input(s): PH, PCO2, PO2 in the last 72 hours. Medications Personally Reviewed:    Current Facility-Administered Medications   Medication Dose Route Frequency    amLODIPine (NORVASC) tablet 10 mg  10 mg Oral DAILY    atorvastatin (LIPITOR) tablet 20 mg  20 mg Oral DAILY    calcium-vitamin D 600 mg-5 mcg (200 unit) per tablet 1 Tablet  1 Tablet Oral DAILY    docusate sodium (COLACE) capsule 100 mg  100 mg Oral DAILY    pantoprazole (PROTONIX) tablet 20 mg  20 mg Oral ACB    metFORMIN (GLUCOPHAGE) tablet 500 mg  500 mg Oral BID WITH MEALS    pregabalin (LYRICA) capsule 50 mg  50 mg Oral BID    alogliptin (NESINA) tablet 12.5 mg  12.5 mg Oral DAILY    acetaminophen (TYLENOL) tablet 500 mg  500 mg Oral Q4H PRN    . PHARMACY TO SUBSTITUTE PER PROTOCOL (Reordered from: estradioL (ESTRACE) 0.01 % (0.1 mg/gram) vaginal cream)    Per Protocol    ketoconazole (NIZORAL) 2 % cream   Topical DAILY    . PHARMACY TO SUBSTITUTE PER PROTOCOL (Reordered from: medical supply, miscellaneous (OCUSOFT LID SCRUB))    Per Protocol    0.9% sodium chloride infusion  125 mL/hr IntraVENous CONTINUOUS    sodium chloride (NS) flush 5-40 mL  5-40 mL IntraVENous Q8H    sodium chloride (NS) flush 5-40 mL  5-40 mL IntraVENous PRN    naloxone (NARCAN) injection 0.4 mg  0.4 mg IntraVENous PRN    senna-docusate (PERICOLACE) 8.6-50 mg per tablet 1 Tablet  1 Tablet Oral BID    polyethylene glycol (MIRALAX) packet 17 g  17 g Oral DAILY    [START ON 5/1/2022] bisacodyL (DULCOLAX) suppository 10 mg  10 mg Rectal DAILY PRN    acetaminophen (TYLENOL) tablet 650 mg  650 mg Oral Q6H    traMADoL (ULTRAM) tablet 50 mg  50 mg Oral Q6H PRN    0.9% sodium chloride infusion  75 mL/hr IntraVENous CONTINUOUS           Problem List:   Hypertension. Diabetes mellitus type 2. History of stroke. Fracture of the right hip and patient had open reduction and internal fixation and is clinically doing well. Echocardiogram revealed ejection fraction is normal.  1.      Assessment/Plan:   Probably will continue present care and a physical therapy and rehabilitation as per orthopedics. Thank you. 1.          []       High complexity decision making was performed in this patient at high risk for decompensation with multiple organ involvement.     Joseph Gilmore MD

## 2022-04-30 NOTE — PROGRESS NOTES
OCCUPATIONAL THERAPY EVALUATION  Patient: John Green (16 y.o. female)  Date: 4/30/2022  Primary Diagnosis: Hip fracture ([address] [S72.009A]  Procedure(s) (LRB):  RIGHT HIP HEMIARTHROPLASTY (Right) 1 Day Post-Op   Precautions: fall risk, WBAT R LE, posterior hip precautions       ASSESSMENT  Pt is a 81 y/o F with PMH of DM, HTN, osteoporosis, and hx L hip hemiarthoplasty presenting [facility] with c/o R hip pain and inability to bear weight through R LE. Per chart, pt denied any signifcant falls since onset of pain 2 days PTA. She reportedly was seen in ED at that time and CT scan was (-) for any fracture. CT pelvis 4/28 showing acute displaced mid (transcervical) fracture of the right femoral neck and older fractures involving the right superior and ischiopubic rami. Pt admitted 4/28/22 and being treated for R femoral neck fx; seen by orthopedics and is s/p R hip hemiarthoplasty on 4/29/22 with Dr. Chato Georges. Pt with orders to be WBAT to R LE and is to be adhering to posterior hip precautions. Pt received semi-supine in bed upon arrival, AXO x4, and agreeable to OT/PT evaluations at this time. Per pt report, pt resides at [address] [facility] (one story with 0 KETTY), was IND with ADLs and Mod I using a rollator for mobility at [facility]. Other DME owned includes: SPC, grab bars, shower chair. Based on current observations, pt presents with deficits in generalized strength/AROM, bed mobility, static/dynamic sitting balance, static/dynamic standing balance, functional activity tolerance, and pain impacting overall performance of ADLs and functional transfers/mobility. Pt educated on WBAT status and posterior hip precautions with good understanding verbalized prior to activity.  Pt currently requires min A with additional time for bed mobility/supine><sit, total A to don socks to feet due to current hip precautions, and min A with additional time for sit><stand transfers bed>chair with gt belt and RW with continued min A and additional time for balance/safety during mobility (see PT note for gait details). Once seated, pt able to complete basic grooming activities (combing hair, washing face, oral care) s/p setup of items with good UE AROM/coordination observed. Pt educated on use of AE/LB dressing techniques with good understanding verbalized; would benefit from additional demonstration with AE in future sessions. Overall, pt tolerates session fair with c/o 3-4/10 pain to R LE with activity today. Pt would benefit from continued skilled OT services to address current impairments and improve IND and safety with self cares and functional transfers/mobility. Current OT d/c recommendation IRF once medically appropriate. Other factors to consider for discharge: family/social support, DME, time since onset, severity of deficits, decline from functional baseline     Patient will benefit from skilled therapy intervention to address the above noted impairments. PLAN :  Recommendations and Planned Interventions: self care training, functional mobility training, therapeutic exercise, balance training, therapeutic activities, endurance activities, patient education and family training/education    Frequency/Duration: Patient will be followed by occupational therapy:  3-5x/week to address goals. Recommendation for discharge: (in order for the patient to meet his/her long term goals)  [address] [facility]     This discharge recommendation:  Has been made in collaboration with the attending provider and/or case management       SUBJECTIVE:   Patient stated I feel pretty good.     OBJECTIVE DATA SUMMARY:   HISTORY:   Past Medical History:   Diagnosis Date    DM [facility]diabetes mellitus) ([facility]     High cholesterol     History of multiple allergies     HTN (hypertension)     Hypertension     Osteoporosis     [facility]      Past Surgical History:   Procedure Laterality Date    HX APPENDECTOMY      HX HIP ARTHROSCOPY      HX HIP REPLACEMENT      HX HYSTERECTOMY      HX KNEE ARTHROSCOPY         Expanded or extensive additional review of patient history:     Home Situation  [facility] Environment: [facility] Name: [address]  # Steps to Enter: 0  One/Two Story Residence: One story  Living Alone: Yes  Support Systems: Assisted Living  Patient Expects to be Discharged to[de-identified] Home with home health  Current DME Used/Available at Home: Emmy Bi, straight,Walker, rollator,Grab bars,Shower chair  Tub or Shower Type: Shower      EXAMINATION OF PERFORMANCE DEFICITS:  Cognitive/Behavioral Status:  Neurologic State: Alert  Orientation Level: Oriented X4      Hearing: Auditory  Auditory Impairment: Hard of hearing, bilateral    Vision/Perceptual:     Corrective Lenses: Glasses    Range of Motion:  AROM: Generally decreased, functional  PROM: Generally decreased, functional                      Strength:  Strength: Generally decreased, functional                Coordination:  Coordination: Generally decreased, functional  Fine Motor Skills-Upper: Left Intact; Right Intact    Gross Motor Skills-Upper: Left Intact; Right Intact    Balance:  Sitting: Intact; With support  Standing: Impaired; Without support  Standing - Static: Fair;Constant support  Standing - Dynamic : Poor;Constant support    Functional Mobility and Transfers for ADLs:  Bed Mobility:  Rolling: Minimum assistance; Additional time  Supine to Sit: Moderate assistance; Additional time  Scooting: Minimum assistance; Moderate assistance    Transfers:  Sit to Stand: Minimum assistance; Additional time;Assist x2  Stand to Sit: Minimum assistance; Additional time  Bed to Chair: Minimum assistance; Additional time    ADL Intervention and task modifications:       Grooming  Grooming Assistance: Set-up; Stand-by assistance  Position Performed: Seated in chair  Washing Face: Set-up; Stand-by assistance  Brushing Teeth: Set-up; Stand-by assistance  Brushing/Combing Hair: Set-up; Stand-by assistance                   Lower Body Dressing Assistance  Socks: Total assistance (dependent)  Position Performed: Long sitting on bed              Therapeutic Exercise:  Pt would benefit from UE HEP to improve overall UE AROM/strength and can be further educated in next treatment session. Functional Measure:    [address] [place] [address] AM-PACTM \"6 Clicks\"                                                       Daily Activity Inpatient Short Form  How much help from another person does the patient currently need. .. Total; A Lot A Little None   1. Putting on and taking off regular lower body clothing? [x]  1 []  2 []  3 []  4   2. Bathing (including washing, rinsing, drying)? []  1 [x]  2 []  3 []  4   3. Toileting, which includes using toilet, bedpan or urinal? [x] 1 []  2 []  3 []  4   4. Putting on and taking off regular upper body clothing? []  1 [x]  2 []  3 []  4   5. Taking care of personal grooming such as brushing teeth? []  1 []  2 [x]  3 []  4   6. Eating meals? []  1 []  2 []  3 [x]  4   © 2007, Trustees of [address] [place] [address], under license to [employer]. All rights reserved     Score: 13/24     Interpretation of Tool:  Represents clinically-significant functional categories (i.e. Activities of daily living). Percentage of Impairment CH    0%   CI    1-19% CJ    20-39% CK    40-59% CL    60-79% CM    80-99% CN     100%   AMPA  Score 6-24 24 23 20-22 15-19 10-14 7-9 6         Occupational Therapy Evaluation Charge Determination   History Examination Decision-Making   LOW Complexity : Brief history review  MEDIUM Complexity : 3-5 performance deficits relating to physical, cognitive , or psychosocial skils that result in activity limitations and / or participation restrictions MEDIUM Complexity : Patient may present with comorbidities that affect occupational performnce.  Miniml to moderate modification of tasks or assistance (eg, physical or verbal ) with assesment(s) is necessary to enable patient to complete evaluation       Based on the above components, the patient evaluation is determined to be of the following complexity level: LOW   Pain Rating:  3-4/10 R LE with activity     Activity Tolerance:   Fair    After treatment patient left in no apparent distress:    Sitting in chair, Heels elevated for pressure relief, Call bell within reach, pillow between B LE, ice pack to R hip    COMMUNICATION/EDUCATION:   The patients plan of care was discussed with: Physical therapist and Registered nurse. Patient/family have participated as able in goal setting and plan of care. and Patient/family agree to work toward stated goals and plan of care. This patients plan of care is appropriate for delegation to [facility].     OT/PT sessions occurred together for increased patient and clinician safety    Thank you for this referral.  Michelle Jernigan  Time Calculation: 37 mins   Problem: Self Care Deficits Care Plan (Adult)  Goal: *Acute Goals and Plan of Care (Insert Text)  Description: Pt stated goal 'to get better'  Pt will be Mod I sup <> sit in prep for EOB ADLs  Pt will be Mod I grooming standing sink side LRAD  Pt will be Mod I UB dressing sitting EOB/long sit   Pt will be Mod I LE dressing sitting EOB/long sit  Pt will be Mod I sit <>  prep for toileting LRAD  Pt will be Mod I toileting/toilet transfer/cloth mgmt LRAD  Pt will be IND following UE HEP in prep for self care tasks      Outcome: Not Met

## 2022-05-01 LAB
ALBUMIN SERPL-MCNC: 2.4 G/DL (ref 3.5–5)
ALBUMIN/GLOB SERPL: 0.9 {RATIO} (ref 1.1–2.2)
ALP SERPL-CCNC: 47 U/L (ref 45–117)
ALT SERPL-CCNC: 12 U/L (ref 12–78)
ANION GAP SERPL CALC-SCNC: 6 MMOL/L (ref 5–15)
AST SERPL W P-5'-P-CCNC: 20 U/L (ref 15–37)
BASOPHILS # BLD: 0 K/UL (ref 0–0.1)
BASOPHILS NFR BLD: 0 % (ref 0–1)
BILIRUB SERPL-MCNC: 0.4 MG/DL (ref 0.2–1)
BUN SERPL-MCNC: 17 MG/DL (ref 6–20)
BUN/CREAT SERPL: 30 (ref 12–20)
CA-I BLD-MCNC: 8.2 MG/DL (ref 8.5–10.1)
CHLORIDE SERPL-SCNC: 109 MMOL/L (ref 97–108)
CO2 SERPL-SCNC: 24 MMOL/L (ref 21–32)
CREAT SERPL-MCNC: 0.56 MG/DL (ref 0.55–1.02)
DIFFERENTIAL METHOD BLD: ABNORMAL
EOSINOPHIL # BLD: 0 K/UL (ref 0–0.4)
EOSINOPHIL NFR BLD: 0 % (ref 0–7)
ERYTHROCYTE [DISTWIDTH] IN BLOOD BY AUTOMATED COUNT: 14 % (ref 11.5–14.5)
GLOBULIN SER CALC-MCNC: 2.6 G/DL (ref 2–4)
GLUCOSE SERPL-MCNC: 275 MG/DL (ref 65–100)
HCT VFR BLD AUTO: 29.4 % (ref 35–47)
HGB BLD-MCNC: 9.3 G/DL (ref 11.5–16)
IMM GRANULOCYTES # BLD AUTO: 0 K/UL (ref 0–0.04)
IMM GRANULOCYTES NFR BLD AUTO: 0 % (ref 0–0.5)
LYMPHOCYTES # BLD: 0.9 K/UL (ref 0.8–3.5)
LYMPHOCYTES NFR BLD: 12 % (ref 12–49)
MCH RBC QN AUTO: 29 PG (ref 26–34)
MCHC RBC AUTO-ENTMCNC: 31.6 G/DL (ref 30–36.5)
MCV RBC AUTO: 91.6 FL (ref 80–99)
MONOCYTES # BLD: 0.7 K/UL (ref 0–1)
MONOCYTES NFR BLD: 8 % (ref 5–13)
NEUTS SEG # BLD: 6.2 K/UL (ref 1.8–8)
NEUTS SEG NFR BLD: 80 % (ref 32–75)
NRBC # BLD: 0 K/UL (ref 0–0.01)
NRBC BLD-RTO: 0 PER 100 WBC
PLATELET # BLD AUTO: 154 K/UL (ref 150–400)
PMV BLD AUTO: 11.4 FL (ref 8.9–12.9)
POTASSIUM SERPL-SCNC: 3.3 MMOL/L (ref 3.5–5.1)
PROT SERPL-MCNC: 5 G/DL (ref 6.4–8.2)
RBC # BLD AUTO: 3.21 M/UL (ref 3.8–5.2)
SODIUM SERPL-SCNC: 139 MMOL/L (ref 136–145)
WBC # BLD AUTO: 7.9 K/UL (ref 3.6–11)

## 2022-05-01 PROCEDURE — 85025 COMPLETE CBC W/AUTO DIFF WBC: CPT

## 2022-05-01 PROCEDURE — 97535 SELF CARE MNGMENT TRAINING: CPT

## 2022-05-01 PROCEDURE — 65270000029 HC RM PRIVATE

## 2022-05-01 PROCEDURE — 80053 COMPREHEN METABOLIC PANEL: CPT

## 2022-05-01 PROCEDURE — 36415 COLL VENOUS BLD VENIPUNCTURE: CPT

## 2022-05-01 PROCEDURE — 74011250637 HC RX REV CODE- 250/637: Performed by: ORTHOPAEDIC SURGERY

## 2022-05-01 PROCEDURE — 74011000250 HC RX REV CODE- 250: Performed by: ORTHOPAEDIC SURGERY

## 2022-05-01 PROCEDURE — 97530 THERAPEUTIC ACTIVITIES: CPT

## 2022-05-01 RX ADMIN — POLYETHYLENE GLYCOL 3350 17 G: 17 POWDER, FOR SOLUTION ORAL at 08:27

## 2022-05-01 RX ADMIN — SODIUM CHLORIDE, PRESERVATIVE FREE 10 ML: 5 INJECTION INTRAVENOUS at 15:43

## 2022-05-01 RX ADMIN — ACETAMINOPHEN 650 MG: 325 TABLET ORAL at 06:37

## 2022-05-01 RX ADMIN — AMLODIPINE BESYLATE 10 MG: 5 TABLET ORAL at 08:27

## 2022-05-01 RX ADMIN — BISACODYL 10 MG: 10 SUPPOSITORY RECTAL at 15:43

## 2022-05-01 RX ADMIN — SODIUM CHLORIDE, PRESERVATIVE FREE 10 ML: 5 INJECTION INTRAVENOUS at 06:38

## 2022-05-01 RX ADMIN — ACETAMINOPHEN 650 MG: 325 TABLET ORAL at 17:04

## 2022-05-01 RX ADMIN — ALOGLIPTIN 12.5 MG: 12.5 TABLET, FILM COATED ORAL at 08:31

## 2022-05-01 RX ADMIN — ATORVASTATIN CALCIUM 20 MG: 20 TABLET, FILM COATED ORAL at 08:28

## 2022-05-01 RX ADMIN — PREGABALIN 50 MG: 50 CAPSULE ORAL at 20:50

## 2022-05-01 RX ADMIN — KETOCONAZOLE: 20 CREAM TOPICAL at 08:29

## 2022-05-01 RX ADMIN — Medication 1 TABLET: at 08:28

## 2022-05-01 RX ADMIN — METFORMIN HYDROCHLORIDE 500 MG: 500 TABLET ORAL at 17:00

## 2022-05-01 RX ADMIN — SODIUM CHLORIDE, PRESERVATIVE FREE 10 ML: 5 INJECTION INTRAVENOUS at 20:55

## 2022-05-01 RX ADMIN — DOCUSATE SODIUM 100 MG: 100 CAPSULE, LIQUID FILLED ORAL at 08:28

## 2022-05-01 RX ADMIN — ACETAMINOPHEN 650 MG: 325 TABLET ORAL at 12:02

## 2022-05-01 RX ADMIN — METFORMIN HYDROCHLORIDE 500 MG: 500 TABLET ORAL at 08:28

## 2022-05-01 RX ADMIN — PANTOPRAZOLE SODIUM 20 MG: 20 TABLET, DELAYED RELEASE ORAL at 08:28

## 2022-05-01 RX ADMIN — SENNOSIDES AND DOCUSATE SODIUM 1 TABLET: 50; 8.6 TABLET ORAL at 08:28

## 2022-05-01 RX ADMIN — PREGABALIN 50 MG: 50 CAPSULE ORAL at 08:28

## 2022-05-01 NOTE — PROGRESS NOTES
Progress Note      5/1/2022 4:25 PM  NAME: Antonio Kwon   MRN:  [id number]   Admit Diagnosis: Hip fracture ([facility] [S72.009A]      Subjective:   Chart reviewed. Discussed with the nephew. Patient had a surgery performed. She feels good. Her hip pain is still there but significantly improved. Review of Systems:    Symptom Y/N Comments  Symptom Y/N Comments   Fever/Chills n   Chest Pain n    Poor Appetite    Edema     Cough    Abdominal Pain     Sputum    Joint Pain y  hip pain is better   SOB/CARBONE n   Pruritis/Rash     Nausea/vomit    Other     Diarrhea         Constipation           Could NOT obtain due to:      Objective:       Physical Exam:    Last 24hrs VS reviewed since prior progress note. Most recent are:    Visit Vitals  /61 (BP 1 Location: Left upper arm, BP Patient Position: At rest)   Pulse 81   Temp 98.7 °F (37.1 °C)   Resp 16   Ht 5' 4\" (1.626 m)   Wt 57.8 kg (127 lb 6.4 oz)   SpO2 91%   Breastfeeding No   BMI 21.87 kg/m²       Intake/Output Summary (Last 24 hours) at 5/1/2022 1504  Last data filed at 5/1/2022 1207  Gross per 24 hour   Intake 2572.5 ml   Output 1600 ml   Net 972.5 ml        General Appearance: Well developed, well nourished, alert & oriented x 3,    no acute distress. Ears/Nose/Mouth/Throat: Hearing grossly normal.  Stigmata of old stroke with the left-sided facial asymmetry  Neck: Supple. Chest: Lungs clear to auscultation bilaterally. Cardiovascular: Regular rate and rhythm, S1,S2 normal, no murmur. Abdomen: Soft, non-tender, bowel sounds are active. Extremities: No edema bilaterally. Skin: Warm and dry. []         Post-cath site without hematoma, bruit, tenderness, or thrill. Distal pulses intact.     PMH/SH reviewed - no change compared to H&P    Data Review    Telemetry: normal sinus rhythm     EKG:   []  No new EKG for review    Lab Data Personally Reviewed:    Recent Labs     05/01/22  0557 04/30/22  0619   WBC 7.9 7.6   HGB 9.3* 10.5*   HCT 29.4* 33.3*  174     Recent Labs     04/28/22 1932   INR 0.9   PTP 12.2      Recent Labs     05/01/22  0557 04/30/22  0619 04/28/22 1932    144 137   K 3.3* 3.7 3.7   * 111* 102   CO2 24 26 29   BUN 17 17 27*   CREA 0.56 0.59 0.89   * 313* 254*   CA 8.2* 7.7* 9.4     No results for input(s): CPK, CKNDX, TROIQ in the last 72 hours. No lab exists for component: CPKMB  No results found for: CHOL, CHOLX, CHLST, CHOLV, HDL, HDLP, LDL, LDLC, DLDLP, Dolores Limerick, CHHD, CHHDX    Recent Labs     05/01/22  0557 04/30/22 0619 04/28/22 1932   AP 47 54 62   TP 5.0* 5.5* 7.3   ALB 2.4* 2.8* 3.4*   GLOB 2.6 2.7 3.9     No results for input(s): PH, PCO2, PO2 in the last 72 hours. Medications Personally Reviewed:    Current Facility-Administered Medications   Medication Dose Route Frequency    amLODIPine (NORVASC) tablet 10 mg  10 mg Oral DAILY    atorvastatin (LIPITOR) tablet 20 mg  20 mg Oral DAILY    calcium-vitamin D 600 mg-5 mcg (200 unit) per tablet 1 Tablet  1 Tablet Oral DAILY    docusate sodium (COLACE) capsule 100 mg  100 mg Oral DAILY    pantoprazole (PROTONIX) tablet 20 mg  20 mg Oral ACB    metFORMIN (GLUCOPHAGE) tablet 500 mg  500 mg Oral BID WITH MEALS    pregabalin (LYRICA) capsule 50 mg  50 mg Oral BID    alogliptin (NESINA) tablet 12.5 mg  12.5 mg Oral DAILY    acetaminophen (TYLENOL) tablet 500 mg  500 mg Oral Q4H PRN    . PHARMACY TO SUBSTITUTE PER PROTOCOL (Reordered from: estradioL (ESTRACE) 0.01 % (0.1 mg/gram) vaginal cream)    Per Protocol    ketoconazole (NIZORAL) 2 % cream   Topical DAILY    sodium chloride (NS) flush 5-40 mL  5-40 mL IntraVENous Q8H    sodium chloride (NS) flush 5-40 mL  5-40 mL IntraVENous PRN    naloxone (NARCAN) injection 0.4 mg  0.4 mg IntraVENous PRN    senna-docusate (PERICOLACE) 8.6-50 mg per tablet 1 Tablet  1 Tablet Oral BID    polyethylene glycol (MIRALAX) packet 17 g  17 g Oral DAILY    bisacodyL (DULCOLAX) suppository 10 mg  10 mg Rectal DAILY PRN    acetaminophen (TYLENOL) tablet 650 mg  650 mg Oral Q6H    traMADoL (ULTRAM) tablet 50 mg  50 mg Oral Q6H PRN    0.9% sodium chloride infusion  75 mL/hr IntraVENous CONTINUOUS           Problem List:   Hypertension. Diabetes mellitus type 2. History of stroke. Fracture of the right hip and patient had open reduction and internal fixation and is clinically doing well. Echocardiogram revealed ejection fraction is normal.  1.      Assessment/Plan:   Probably will continue present care and a physical therapy and rehabilitation as per orthopedics. Thank you. 1.          []       High complexity decision making was performed in this patient at high risk for decompensation with multiple organ involvement.     Mj Becerril MD

## 2022-05-01 NOTE — PROGRESS NOTES
Progress Note  Date:2022       Room:[facility]  Patient Name:Collette Cleary     YOB: 1923     Age:89 y.o. Subjective    Subjective   Covering for    Chart reviewed and patient examined   Complaining of problem with constipation just received Dulcolax suppository   Fracture right hip status post right hip hemiarthroplasty with some postoperative pain improving  Patient laying in bed in no acute distress claimed feeling better   Denies any shortness of breath chest pain   Available lab and test reviewed   Patient condition progress discharge plan discussed with the family member    Review of Systems     Unremarkable than mentioned in subjective  Objective         Vitals Last 24 Hours:  TEMPERATURE:  Temp  Av.3 °F (36.8 °C)  Min: 98 °F (36.7 °C)  Max: 98.7 °F (37.1 °C)  RESPIRATIONS RANGE: Resp  Av  Min: 16  Max: 16  PULSE OXIMETRY RANGE: SpO2  Av.2 %  Min: 90 %  Max: 95 %  PULSE RANGE: Pulse  Av.6  Min: 80  Max: 92  BLOOD PRESSURE RANGE: Systolic (99NQA), UIK:389 , Min:133 , TFK:670   ; Diastolic (42NXD), CQF:38, Min:56, Max:65      I/O (24Hr):     Intake/Output Summary (Last 24 hours) at 2022 1622  Last data filed at 2022 1207  Gross per 24 hour   Intake 2172.5 ml   Output 1000 ml   Net 1172.5 ml     Current Facility-Administered Medications:     amLODIPine (NORVASC) tablet 10 mg, 10 mg, Oral, DAILY, Singaraju, Monte Harada M, MD, 10 mg at 22    atorvastatin (LIPITOR) tablet 20 mg, 20 mg, Oral, DAILY, Adan ROGERS MD, 20 mg at 22    calcium-vitamin D 600 mg-5 mcg (200 unit) per tablet 1 Tablet, 1 Tablet, Oral, DAILY, Singaraju, Monte Harada M, MD, 1 Tablet at 22    docusate sodium (COLACE) capsule 100 mg, 100 mg, Oral, DAILY, Singaraju, Monte Harada M, MD, 100 mg at 22    pantoprazole (PROTONIX) tablet 20 mg, 20 mg, Oral, ACYUNIER, Mustapha Grady MD, 20 mg at 22 0828    metFORMIN (GLUCOPHAGE) tablet 500 mg, 500 mg, Oral, BID WITH MEALS, Tamara ROGERS MD, 500 mg at 05/01/22 0828    pregabalin (LYRICA) capsule 50 mg, 50 mg, Oral, BID, Tamara ROGERS MD, 50 mg at 05/01/22 8416    alogliptin (NESINA) tablet 12.5 mg, 12.5 mg, Oral, DAILY, Mustapha Grady MD, 12.5 mg at 05/01/22 0831    acetaminophen (TYLENOL) tablet 500 mg, 500 mg, Oral, Q4H PRN, Yony Hobbs MD    . PHARMACY TO SUBSTITUTE PER PROTOCOL (Reordered from: estradioL (ESTRACE) 0.01 % (0.1 mg/gram) vaginal cream), , , Per Protocol, Tamara ROGERS MD    ketoconazole (NIZORAL) 2 % cream, , Topical, DAILY, Yony Hobbs MD, Given at 05/01/22 0829    sodium chloride (NS) flush 5-40 mL, 5-40 mL, IntraVENous, Q8H, Mustapha Grady MD, 10 mL at 05/01/22 1543    sodium chloride (NS) flush 5-40 mL, 5-40 mL, IntraVENous, PRN, Ally Grady MD    naloxone (NARCAN) injection 0.4 mg, 0.4 mg, IntraVENous, PRN, Yony Hobbs MD    senna-docusate (PERICOLACE) 8.6-50 mg per tablet 1 Tablet, 1 Tablet, Oral, BID, Tamara ROGERS MD, 1 Tablet at 05/01/22 0828    polyethylene glycol (MIRALAX) packet 17 g, 17 g, Oral, DAILY, Dieudonne Grady MD, 17 g at 05/01/22 0827    bisacodyL (DULCOLAX) suppository 10 mg, 10 mg, Rectal, DAILY PRN, Yony Hobbs MD, 10 mg at 05/01/22 1543    acetaminophen (TYLENOL) tablet 650 mg, 650 mg, Oral, Q6H, Tamara ROGERS MD, 650 mg at 05/01/22 1202    traMADoL (ULTRAM) tablet 50 mg, 50 mg, Oral, Q6H PRN, Tamara ROGERS MD, 50 mg at 04/30/22 2311    0.9% sodium chloride infusion, 75 mL/hr, IntraVENous, CONTINUOUS, Ty Wetzel MD, Stopped at 04/29/22 2733     Objective    General exam: Patient laying in bed in no acute distress   HEENT exam pallor plus no icterus   Neck is supple both are well felt   RS equal air entry of both side no rales or rhonchi   CVS regular S1-S2 no gallop murmur   Abdomen soft nontender bowels are active   Extremity no edema peripheral pulse well felt CNS patient is awake alert. No focal neurodeficit    Labs/Imaging/Diagnostics    Labs:  CBC:  Recent Labs     05/01/22  0557 04/30/22  0619 04/28/22 1932   WBC 7.9 7.6 7.9   RBC 3.21* 3.60* 4.25   HGB 9.3* 10.5* 12.6   HCT 29.4* 33.3* 38.8   MCV 91.6 92.5 91.3   RDW 14.0 13.9 13.9    174 196     CHEMISTRIES:  Recent Labs     05/01/22  0557 04/30/22  0619 04/28/22 1932    144 137   K 3.3* 3.7 3.7   * 111* 102   CO2 24 26 29   BUN 17 17 27*   CREA 0.56 0.59 0.89   CA 8.2* 7.7* 9.4   PT/INR:  Recent Labs     04/28/22 1932   INR 0.9     APTT:No results for input(s): APTT in the last 72 hours. LIVER PROFILE:  Recent Labs     05/01/22  0557 04/30/22  0619 04/28/22 1932   AST 20 24 14*   ALT 12 17 21     Lab Results   Component Value Date/Time    ALT (SGPT) 12 05/01/2022 05:57 AM    AST (SGOT) 20 05/01/2022 05:57 AM    Alk. phosphatase 47 05/01/2022 05:57 AM    Bilirubin, total 0.4 05/01/2022 05:57 AM       Imaging Last 24 Hours:  No results found.   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    Osteoporosis 03/19/2019    Acquired keratoderma 02/18/2019    Diabetes mellitus ([facility] [address] 02/18/2019    Metatarsalgia 10/02/2018    Cellulitis of foot 03/27/2018    Fracture of pelvis ([facility] [address] 06/20/2017     Assessment & Plan   Anemia with a decreased H&H acute blood loss patient asymptomatic as per chest pain shortness of breath concern   Fracture right hip status post right hemiarthroplasty   Hypertension   Diabetes with hyperglycemia   Hypertension presently stable   History of osteoporosis on Prolia shot    Plan\    Continue current medical therapy  PT and OT awaiting discharge planning/rehab placement    Discussed with the nurse and family    Time spent more than 30-minute        Electronically signed by Tanmay Starr MD on 5/1/2022 at 4:22 PM

## 2022-05-01 NOTE — PROGRESS NOTES
Alert oriented intermittent confusion, denies pain OOB to bedside commode this shift tolerated.   Requires 1 person assist.  No s/s of pain discomfort or distress

## 2022-05-01 NOTE — PROGRESS NOTES
Bedside shift change report received from [facility] (offgoing nurse) by TESS Cuenca. Report included the following information SBAR, Kardex, ED Summary, Procedure Summary, Intake/Output, MAR, Accordion, Recent Results and Med Rec Status.

## 2022-05-01 NOTE — PROGRESS NOTES
Progress Note    Patient: Jagdish Smith MRN: [id number]  SSN: [id number]    YOB: 1923  Age: 80 y.o. Sex: female      Admit Date: 4/28/2022    LOS: 3 days     Subjective:     Pt resting in bed comfortably  No chest pain /sob     Objective:     Vitals:    05/01/22 0119 05/01/22 0341 05/01/22 0816 05/01/22 1602   BP: (!) 149/65 137/61 135/61 (!) 135/57   Pulse: 80 92 81 85   Resp: 16 16 16 16   Temp: 98 °F (36.7 °C) 98.7 °F (37.1 °C) 98.7 °F (37.1 °C) 98 °F (36.7 °C)   SpO2: 94% 91% 91% 90%   Weight:  57.8 kg (127 lb 6.4 oz)     Height:            Intake and Output:  Current Shift: 05/01 0701 - 05/01 1900  In: -   Out: 400 [Urine:400]  Last three shifts: 04/29 1901 - 05/01 0700  In: 4382.5 [P.O.:1040;  I.V.:3342.5]  Out: 2975 [Urine:2975]    Physical Exam:   aao x 3   Right LE: Dressing c/d/i  Compartments soft, nontender  DP(+)       Lab/Data Review:  CBC:   Lab Results   Component Value Date/Time    WBC 7.9 05/01/2022 05:57 AM    HGB 9.3 (L) 05/01/2022 05:57 AM    HCT 29.4 (L) 05/01/2022 05:57 AM     05/01/2022 05:57 AM          Assessment:     Active Problems:    Hip fracture ([address] (4/28/2022)        Plan:     Pod#2 S/P  Right hip hemiarthroplasty in 79 y/o female:     -Pain control  -PT OT: Weight-bear as tolerated right lower extremity  -Lovenox for DVT prophylaxis  -Acute blood loss anemia we will monitor  -Leave lawrence dressing on for 7 days and then switch to Silverlon dressing  -DC placement :  will likely need rehab    Signed By: Silviano Call MD     May 1, 2022

## 2022-05-01 NOTE — PROGRESS NOTES
Problem: Pain  Goal: *Control of Pain  Outcome: Progressing Towards Goal  Note: Patient is reporting that her pain is being better controlled today. Pain will continue to be assessed and treated as needed. Bedside shift change report given to Angelica Garrido RN (oncoming nurse) by Jagdish Irvin RN (offgoing nurse). Report included the following information SBAR, Kardex, ED Summary, Procedure Summary, Intake/Output, MAR, Accordion, Recent Results and Med Rec Status.

## 2022-05-01 NOTE — PROGRESS NOTES
Patient alert and oriented with some confusion at times. Tolerates meds as ordered. Patient requires assistance to use BSC. Educated to use call bell for assistance with transfers and for pain management. Verbalized understanding.

## 2022-05-01 NOTE — PROGRESS NOTES
Problem: Mobility Impaired (Adult and Pediatric)  Goal: *Acute Goals and Plan of Care (Insert Text)  Description: Pt stated goal: to get better  Pt will be I with LE HEP in 7 days. Pt will perform bed mobility with mod I in 7 days. Pt will perform transfers with mod I in 7 days. Pt will amb 25-50 feet with LRAD safely with mod I in 7 days. Pt will verbalize and demonstrate compliance with posterior hip precautions to include no abduction, no hip flexion >90 deg, and no IR of right hip in 7 days. Outcome: Progressing Towards Goal   PHYSICAL THERAPY TREATMENT  Patient: Grisel Walden (89 y.o. female)  Date: 5/1/2022  Diagnosis: Hip fracture ([facility] [address] [facility] <principal problem not specified>  Procedure(s) (LRB):  RIGHT HIP HEMIARTHROPLASTY (Right) 2 Days Post-Op  Precautions:    Chart, physical therapy assessment, plan of care and goals were reviewed. ASSESSMENT  Patient continues with skilled PT services and is progressing towards goals. Co-treat with OT due to level of assist needed for mobility for safety of patient/clinician. Upon entry into room, patient semi-supine in bed and agreeable to work with therapy. Bed mobility performed SBA with incr time to come to sitting and use of bed rails. PT removed adduction wedge in place at start of session. Good recall of hip precautions with a little cueing. Sit<>stand from edge of bed with modA x 2, RW, cues for hand placement and additional time. Patient ambulated ~5 feet to recliner with Gita x 2, RW and assistance with AD management and cues for step sequencing. Gita for descent into recliner. OT then assisting with bathing patient's feet, per patient request. Positioned patient to comfort in recliner and set up for breakfast. Patient will benefit from continued skilled PT services to improve her strength for mobility and practice with longer gait distances to return to PLOF and decr assistance from caregivers.      Current Level of Function Impacting Discharge (mobility/balance): assist x 2 for mobility, decr strength, decr endurance     Other factors to consider for discharge: age, medical hx, motivated to get better, low pain levels with activity          PLAN :  Patient continues to benefit from skilled intervention to address the above impairments. Continue treatment per established plan of care. to address goals. Recommendation for discharge: (in order for the patient to meet his/her long term goals)  Home with [facility]    This discharge recommendation:  Has been made in collaboration with the attending provider and/or case management    IF patient discharges home will need the following DME: rolling walker and to be determined (TBD)       SUBJECTIVE:   Patient stated I surely can't wait to get home.     OBJECTIVE DATA SUMMARY:   Critical Behavior:  Neurologic State: Alert  Orientation Level: Oriented X4  Cognition: Follows commands,Appropriate for age attention/concentration     Functional Mobility Training:  Bed Mobility:  Rolling: Stand-by assistance  Supine to Sit: Stand-by assistance     Scooting: Stand-by assistance        Transfers:  Sit to Stand: Moderate assistance;Assist x2  Stand to Sit: Minimum assistance;Assist x2        Bed to Chair: Minimum assistance;Assist x2; Additional time    Balance:  Sitting: Intact  Standing: Impaired; With support  Standing - Static: Good;Constant support  Standing - Dynamic : Fair;Constant support  Ambulation/Gait Training:  Distance (ft): 5 Feet (ft)  Assistive Device: Gait belt;Walker, rolling  Ambulation - Level of Assistance: Minimal assistance;Assist x2; Additional time           Right Side Weight Bearing: As tolerated     Base of Support: Narrowed     Speed/Shavon: Slow;Shuffled      Therapeutic Exercises:   Not today     Pain Ratin-2/10 with activity     Activity Tolerance:   Good, SpO2 stable on RA, and requires rest breaks  Please refer to the flowsheet for vital signs taken during this treatment. After treatment patient left in no apparent distress:   Sitting in chair and Call bell within reach, instructed patient to call for nursing when ready to get back to bed     COMMUNICATION/COLLABORATION:   The patients plan of care was discussed with: Physical therapist, Occupational therapy assistant, and Registered nurse.      Noris Sierra   Time Calculation: 29 mins

## 2022-05-01 NOTE — PROGRESS NOTES
Problem: Self Care Deficits Care Plan (Adult)  Goal: *Acute Goals and Plan of Care (Insert Text)  Description: Pt stated goal 'to get better'  Pt will be Mod I sup <> sit in prep for EOB ADLs  Pt will be Mod I grooming standing sink side LRAD  Pt will be Mod I UB dressing sitting EOB/long sit   Pt will be Mod I LE dressing sitting EOB/long sit  Pt will be Mod I sit <>  prep for toileting LRAD  Pt will be Mod I toileting/toilet transfer/cloth mgmt LRAD  Pt will be IND following UE HEP in prep for self care tasks      Outcome: Progressing Towards Goal   OCCUPATIONAL THERAPY TREATMENT  Patient: Anupama Hsu (22 y.o. female)  Date: 5/1/2022  Diagnosis: Hip fracture ([address] [facility] <principal problem not specified>  Procedure(s) (LRB):  RIGHT HIP HEMIARTHROPLASTY (Right) 2 Days Post-Op  Precautions: FALL, WBAT R:E, posterior hip precautions   Chart, occupational therapy assessment, plan of care, and goals were reviewed. ASSESSMENT  Patient continues with skilled OT services and is progressing towards goals. Patient received semi supine in bed upon OTOOLE/PTA arrival and agreeable to work with therapist. In long sitting, pt able to don L sock with crossing leg but unable to 2/2 R hip precautions which pt knew not to do. Patient required SBA for bed mobility,supine to sit EOB and scooting with additional time 2/2 UB/LB stiffness. STS using Rw/gait belt with MOD A X2, bed>chair with min A x2 with additional time with vc's for RW mgmt and tf technique for safety. Seated in chair, pt requested her feet washed with TA 2/2 hip precautions. Per pt request, food was reheated and Pt is IND for eating. Patient left seated in chair with call bell within reach and all needs met. Pt continues to be limited with decreased weakness/joint stiffness and hip precautions.  Patient would benefit from continued skilled Occupational services while at [address] [place] [address] in order to increase safety and independence with self care and functional tranfers/mobility. Recommend at discharge to IRF when medically appropriate. Current Level of Function Impacting Discharge (ADLs): TA for LB bathing, mod A for LB dressing    Other factors to consider for discharge: Time of onset, medical prognosis/diagnosis, severity of deficits, PLOF,hip precautions and family support          PLAN :  Patient continues to benefit from skilled intervention to address the above impairments. Continue treatment per established plan of care. to address goals. Recommend with staff: chair level grooming    Recommend next OT session: toileting BSC    Recommendation for discharge: (in order for the patient to meet his/her long term goals)  Anup Caballero    This discharge recommendation:  Has been made in collaboration with the attending provider and/or case management    IF patient discharges home will need the following DME: TBD       SUBJECTIVE:   Patient stated I feel pretty good.     OBJECTIVE DATA SUMMARY:   Cognitive/Behavioral Status:  Neurologic State: Alert  Orientation Level: Oriented X4  Cognition: Follows commands; Appropriate for age attention/concentration             Functional Mobility and Transfers for ADLs:  Bed Mobility:  Rolling: Stand-by assistance  Supine to Sit: Stand-by assistance  Scooting: Stand-by assistance    Transfers:  Sit to Stand: Moderate assistance;Assist x2     Bed to Chair: Minimum assistance;Assist x2; Additional time    Balance:  Sitting: Intact  Standing: Impaired; With support  Standing - Static: Good;Constant support  Standing - Dynamic : Fair;Constant support    ADL Intervention:  Feeding  Feeding Assistance: Independent  Container Management: Independent  Cutting Food: Independent  Utensil Management: Independent  Food to Mouth: Independent  Drink to Mouth: Independent              Lower Body Bathing  Bathing Assistance:  Total assistance(dependent)  Lower Body : Total assistance (dependent)  Position Performed: Long sitting on bed (dalia feet )         Lower Body Dressing Assistance  Dressing Assistance: Moderate assistance  Socks: Moderate assistance  Leg Crossed Method Used: Yes  Position Performed: Long sitting on bed       Pain:  1-2/10 R hip    Activity Tolerance:   Good  Please refer to the flowsheet for vital signs taken during this treatment. After treatment patient left in no apparent distress:   Sitting in chair and Call bell within reach    COMMUNICATION/COLLABORATION:   The patients plan of care was discussed with: Physical therapy assistant and Registered nurse.  Co-tx with PTA for increased pt/clinician safety with OOB activity    XIANG Vieyra  Time Calculation: 29 mins

## 2022-05-02 LAB
BACTERIA SPEC CULT: ABNORMAL
COLONY COUNT,CNT: ABNORMAL
COLONY COUNT,CNT: ABNORMAL
GLUCOSE BLD STRIP.AUTO-MCNC: 190 MG/DL (ref 65–117)
GLUCOSE BLD STRIP.AUTO-MCNC: 190 MG/DL (ref 65–117)
PERFORMED BY, TECHID: ABNORMAL
PERFORMED BY, TECHID: ABNORMAL
SPECIAL REQUESTS,SREQ: ABNORMAL

## 2022-05-02 PROCEDURE — 65270000029 HC RM PRIVATE

## 2022-05-02 PROCEDURE — 74011250637 HC RX REV CODE- 250/637: Performed by: ORTHOPAEDIC SURGERY

## 2022-05-02 PROCEDURE — 74011250637 HC RX REV CODE- 250/637: Performed by: PHYSICIAN ASSISTANT

## 2022-05-02 PROCEDURE — 97530 THERAPEUTIC ACTIVITIES: CPT

## 2022-05-02 PROCEDURE — 82962 GLUCOSE BLOOD TEST: CPT

## 2022-05-02 PROCEDURE — 74011000250 HC RX REV CODE- 250: Performed by: ORTHOPAEDIC SURGERY

## 2022-05-02 PROCEDURE — 74011250637 HC RX REV CODE- 250/637: Performed by: INTERNAL MEDICINE

## 2022-05-02 RX ORDER — POTASSIUM CHLORIDE 750 MG/1
40 TABLET, FILM COATED, EXTENDED RELEASE ORAL
Status: DISCONTINUED | OUTPATIENT
Start: 2022-05-02 | End: 2022-05-02

## 2022-05-02 RX ORDER — ASPIRIN 325 MG
325 TABLET, DELAYED RELEASE (ENTERIC COATED) ORAL 2 TIMES DAILY
Status: DISCONTINUED | OUTPATIENT
Start: 2022-05-02 | End: 2022-05-04 | Stop reason: HOSPADM

## 2022-05-02 RX ORDER — AMOXICILLIN 250 MG
1 CAPSULE ORAL 2 TIMES DAILY
Qty: 60 TABLET | Refills: 0 | Status: SHIPPED | OUTPATIENT
Start: 2022-05-02 | End: 2022-06-01

## 2022-05-02 RX ORDER — LANOLIN ALCOHOL/MO/W.PET/CERES
1 CREAM (GRAM) TOPICAL
Status: DISCONTINUED | OUTPATIENT
Start: 2022-05-03 | End: 2022-05-04 | Stop reason: HOSPADM

## 2022-05-02 RX ORDER — LANOLIN ALCOHOL/MO/W.PET/CERES
325 CREAM (GRAM) TOPICAL
Qty: 30 TABLET | Refills: 0 | Status: SHIPPED | OUTPATIENT
Start: 2022-05-03 | End: 2022-06-02

## 2022-05-02 RX ORDER — POTASSIUM CHLORIDE 1.5 G/1.77G
40 POWDER, FOR SOLUTION ORAL
Status: COMPLETED | OUTPATIENT
Start: 2022-05-02 | End: 2022-05-02

## 2022-05-02 RX ADMIN — METFORMIN HYDROCHLORIDE 500 MG: 500 TABLET ORAL at 08:56

## 2022-05-02 RX ADMIN — POLYETHYLENE GLYCOL 3350 17 G: 17 POWDER, FOR SOLUTION ORAL at 08:56

## 2022-05-02 RX ADMIN — SODIUM CHLORIDE, PRESERVATIVE FREE 10 ML: 5 INJECTION INTRAVENOUS at 21:01

## 2022-05-02 RX ADMIN — SENNOSIDES AND DOCUSATE SODIUM 1 TABLET: 50; 8.6 TABLET ORAL at 08:55

## 2022-05-02 RX ADMIN — ACETAMINOPHEN 650 MG: 325 TABLET ORAL at 06:25

## 2022-05-02 RX ADMIN — ACETAMINOPHEN 650 MG: 325 TABLET ORAL at 00:26

## 2022-05-02 RX ADMIN — AMLODIPINE BESYLATE 10 MG: 5 TABLET ORAL at 08:55

## 2022-05-02 RX ADMIN — PREGABALIN 50 MG: 50 CAPSULE ORAL at 20:59

## 2022-05-02 RX ADMIN — POTASSIUM CHLORIDE 40 MEQ: 1.5 POWDER, FOR SOLUTION ORAL at 12:25

## 2022-05-02 RX ADMIN — TRAMADOL HYDROCHLORIDE 50 MG: 50 TABLET, COATED ORAL at 03:16

## 2022-05-02 RX ADMIN — ASPIRIN 325 MG: 325 TABLET, COATED ORAL at 20:59

## 2022-05-02 RX ADMIN — Medication 1 TABLET: at 08:55

## 2022-05-02 RX ADMIN — ACETAMINOPHEN 650 MG: 325 TABLET ORAL at 17:41

## 2022-05-02 RX ADMIN — ATORVASTATIN CALCIUM 20 MG: 20 TABLET, FILM COATED ORAL at 08:56

## 2022-05-02 RX ADMIN — ALOGLIPTIN 12.5 MG: 12.5 TABLET, FILM COATED ORAL at 08:55

## 2022-05-02 RX ADMIN — SODIUM CHLORIDE, PRESERVATIVE FREE 10 ML: 5 INJECTION INTRAVENOUS at 06:46

## 2022-05-02 RX ADMIN — SENNOSIDES AND DOCUSATE SODIUM 1 TABLET: 50; 8.6 TABLET ORAL at 20:58

## 2022-05-02 RX ADMIN — PANTOPRAZOLE SODIUM 20 MG: 20 TABLET, DELAYED RELEASE ORAL at 06:25

## 2022-05-02 RX ADMIN — ACETAMINOPHEN 650 MG: 325 TABLET ORAL at 11:37

## 2022-05-02 RX ADMIN — METFORMIN HYDROCHLORIDE 500 MG: 500 TABLET ORAL at 17:41

## 2022-05-02 RX ADMIN — DOCUSATE SODIUM 100 MG: 100 CAPSULE, LIQUID FILLED ORAL at 08:55

## 2022-05-02 RX ADMIN — PREGABALIN 50 MG: 50 CAPSULE ORAL at 08:56

## 2022-05-02 NOTE — PROGRESS NOTES
PHYSICAL THERAPY TREATMENT  Patient: Emily Zheng (89 y.o. female)  Date: 5/2/2022  Diagnosis: Hip fracture ([address] [facility] <principal problem not specified>  Procedure(s) (LRB):  RIGHT HIP HEMIARTHROPLASTY (Right) 3 Days Post-Op  Precautions:    Chart, physical therapy assessment, plan of care and goals were reviewed. ASSESSMENT  Patient continues with skilled PT services and is progressing towards goals. Pt semi supine in bed upon arrival and agreeable to session. Good recall of hip precautions. Noted improvement with bed mobility and transfers this session (sup>sit min A, STS min A). Increased ambulation distance with RW and CGA, gait is slow. Step to gait pattern noted. Reporting increased in pain during ambulation in R hip. Pt returned to recliner and performed seated therex, see details below. Pt set up with breakfast. Rec d/c to IRF once medically appropriate. Current Level of Function Impacting Discharge (mobility/balance): min/CGA     Other factors to consider for discharge: PLOF, time since onset, severity of deficits          PLAN :  Patient continues to benefit from skilled intervention to address the above impairments. Continue treatment per established plan of care. to address goals.     Recommendation for discharge: (in order for the patient to meet his/her long term goals)  [address]     This discharge recommendation:  Has been made in collaboration with the attending provider and/or case management    IF patient discharges home will need the following DME: to be determined (TBD)       SUBJECTIVE:   Patient stated i'm feeling pretty good    OBJECTIVE DATA SUMMARY:   Critical Behavior:  Neurologic State: Alert  Orientation Level: Oriented X4  Cognition: Follows commands    Functional Mobility Training:  Bed Mobility:  Rolling: Stand-by assistance  Supine to Sit: Minimum assistance  Scooting: Stand-by assistance    Transfers:  Sit to Stand: Minimum assistance  Stand to Sit: Minimum assistance  Bed to Chair: Contact guard assistance    Balance:  Sitting: Intact  Standing: Impaired; With support  Standing - Static: Constant support;Good  Standing - Dynamic : Constant support; Fair    Ambulation/Gait Training:  Distance (ft): 10 Feet (ft)  Assistive Device: Gait belt;Walker, rolling  Ambulation - Level of Assistance: Contact guard assistance  Right Side Weight Bearing: As tolerated  Speed/Shavon: Slow      Therapeutic Exercises:   1 x 12 LAQ BLE  Educated on AP    Pain Ratin/10 R hip pain during ambulation  2/10 R hip pain post ambulation, at rest, in chair      Activity Tolerance:   Fair and requires rest breaks  Please refer to the flowsheet for vital signs taken during this treatment. After treatment patient left in no apparent distress:   Sitting in chair and Call bell within reach    COMMUNICATION/COLLABORATION:   The patients plan of care was discussed with: Registered nurse. Problem: Mobility Impaired (Adult and Pediatric)  Goal: *Acute Goals and Plan of Care (Insert Text)  Description: Pt stated goal: to get better  Pt will be I with LE HEP in 7 days. Pt will perform bed mobility with mod I in 7 days. Pt will perform transfers with mod I in 7 days. Pt will amb 25-50 feet with LRAD safely with mod I in 7 days. Pt will verbalize and demonstrate compliance with posterior hip precautions to include no abduction, no hip flexion >90 deg, and no IR of right hip in 7 days.        Outcome: Progressing Towards Goal       Balta Mixon PTA   Time Calculation: 23 mins

## 2022-05-02 NOTE — PROGRESS NOTES
Orthopedic progress note    Date:2022       Room:[facility]  Patient Name:Collette Cleary     YOB: 1923     Age:89 y.o. Subjective    80year old female status post right hip hemiarthroplasty. Postop day #3. Patient doing well. She complains of minimal pain of her right hip. Tolerating therapy well. No other complaints at this time. Objective           Vitals Last 24 Hours:  TEMPERATURE:  Temp  Av.9 °F (36.6 °C)  Min: 97.5 °F (36.4 °C)  Max: 98.1 °F (36.7 °C)  RESPIRATIONS RANGE: Resp  Av  Min: 16  Max: 16  PULSE OXIMETRY RANGE: SpO2  Av.5 %  Min: 90 %  Max: 93 %  PULSE RANGE: Pulse  Av.3  Min: 80  Max: 88  BLOOD PRESSURE RANGE: Systolic (40MXH), PHQ:816 , Min:135 , SBS:202   ; Diastolic (15PKS), LZT:36, Min:57, Max:65    Current Facility-Administered Medications   Medication Dose Route Frequency    amLODIPine (NORVASC) tablet 10 mg  10 mg Oral DAILY    atorvastatin (LIPITOR) tablet 20 mg  20 mg Oral DAILY    calcium-vitamin D 600 mg-5 mcg (200 unit) per tablet 1 Tablet  1 Tablet Oral DAILY    docusate sodium (COLACE) capsule 100 mg  100 mg Oral DAILY    pantoprazole (PROTONIX) tablet 20 mg  20 mg Oral ACB    metFORMIN (GLUCOPHAGE) tablet 500 mg  500 mg Oral BID WITH MEALS    pregabalin (LYRICA) capsule 50 mg  50 mg Oral BID    alogliptin (NESINA) tablet 12.5 mg  12.5 mg Oral DAILY    acetaminophen (TYLENOL) tablet 500 mg  500 mg Oral Q4H PRN    . PHARMACY TO SUBSTITUTE PER PROTOCOL (Reordered from: estradioL (ESTRACE) 0.01 % (0.1 mg/gram) vaginal cream)    Per Protocol    ketoconazole (NIZORAL) 2 % cream   Topical DAILY    sodium chloride (NS) flush 5-40 mL  5-40 mL IntraVENous Q8H    sodium chloride (NS) flush 5-40 mL  5-40 mL IntraVENous PRN    naloxone (NARCAN) injection 0.4 mg  0.4 mg IntraVENous PRN    senna-docusate (PERICOLACE) 8.6-50 mg per tablet 1 Tablet  1 Tablet Oral BID    polyethylene glycol (MIRALAX) packet 17 g  17 g Oral DAILY    bisacodyL (DULCOLAX) suppository 10 mg  10 mg Rectal DAILY PRN    acetaminophen (TYLENOL) tablet 650 mg  650 mg Oral Q6H    traMADoL (ULTRAM) tablet 50 mg  50 mg Oral Q6H PRN    0.9% sodium chloride infusion  75 mL/hr IntraVENous CONTINUOUS      Review of Systems   Constitutional: Negative for malaise/fatigue. Respiratory: Negative for cough, shortness of breath and wheezing. Cardiovascular: Negative for chest pain and palpitations. Gastrointestinal: Negative for abdominal pain, heartburn, nausea and vomiting. Neurological: Negative for headaches. Musculoskeletal: Denies any numbness/tingling of operative extremity    I/O (24Hr): Intake/Output Summary (Last 24 hours) at 5/2/2022 1337  Last data filed at 5/2/2022 0649  Gross per 24 hour   Intake --   Output 1101 ml   Net -1101 ml     Objective  Labs/Imaging/Diagnostics    Labs:  CBC:  Recent Labs     05/01/22  0557 04/30/22  0619   WBC 7.9 7.6   RBC 3.21* 3.60*   HGB 9.3* 10.5*   HCT 29.4* 33.3*   MCV 91.6 92.5   RDW 14.0 13.9    174     CHEMISTRIES:  Recent Labs     05/01/22  0557 04/30/22  0619    144   K 3.3* 3.7   * 111*   CO2 24 26   BUN 17 17   CREA 0.56 0.59   CA 8.2* 7.7*   PT/INR:No results for input(s): INR, INREXT in the last 72 hours. No lab exists for component: PROTIME  APTT:No results for input(s): APTT in the last 72 hours. LIVER PROFILE:  Recent Labs     05/01/22  0557 04/30/22  0619   AST 20 24   ALT 12 17     Lab Results   Component Value Date/Time    ALT (SGPT) 12 05/01/2022 05:57 AM    AST (SGOT) 20 05/01/2022 05:57 AM    Alk. phosphatase 47 05/01/2022 05:57 AM    Bilirubin, total 0.4 05/01/2022 05:57 AM       Physical Exam:  Right hip: Billy dressing in place. Scant bloody drainage seen throughout dressing. Mild swelling seen throughout right thigh. No tenderness palpation throughout right thigh. No calf pain to palpation. EHL/DF/PF is 4 out of 5. Heels are dressed. Abduction pillow in place.     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 Postop day #3. Continue with therapy as tolerated. Will start aspirin for DVT prophylaxis. Spirometer and exercises encouraged. Okay to discharge patient from orthopedics when cleared by medicine and placement has been made. She is currently resident at [address] Patient follow-up with Dr. Laurie Nina as outpatient in approximately 2 weeks.     I independently evaluated the patient and agree with above plan         Electronically signed by Ryan Thakkar PA-C on 5/2/2022 at 1:37 PM

## 2022-05-02 NOTE — ROUTINE PROCESS
Nurse driven protocol for braga catheter to be removed. Procedure explained to patient. Braga catheter was pulled out. Patient tolerated procedure well. Patient verbalized understanding to call the nurse if she needs to void. Will continue to monitor.

## 2022-05-02 NOTE — PROGRESS NOTES
PROGRESS NOTE      Subjective: Sitting in the chair. Doing well. Denies any chest pain or shortness of breath. Denies any hip pain. Appetite is okay. Visit Vitals  BP (!) 145/52   Pulse 80   Temp 97.9 °F (36.6 °C)   Resp 16   Ht 5' 4\" (1.626 m)   Wt 57.3 kg (126 lb 4.8 oz)   SpO2 100%   Breastfeeding No   BMI 21.68 kg/m²       Current Facility-Administered Medications:     aspirin delayed-release tablet 325 mg, 325 mg, Oral, BID, Letha Arroyo PA-C    [START ON 5/3/2022] ferrous sulfate tablet 325 mg, 1 Tablet, Oral, DAILY WITH BREAKFAST, Mohsen Bruno MD    amLODIPine (NORVASC) tablet 10 mg, 10 mg, Oral, DAILY, Adan ROGERS MD, 10 mg at 05/02/22 0855    atorvastatin (LIPITOR) tablet 20 mg, 20 mg, Oral, DAILY, Adan ROGERS MD, 20 mg at 05/02/22 0856    calcium-vitamin D 600 mg-5 mcg (200 unit) per tablet 1 Tablet, 1 Tablet, Oral, DAILY, Mustapha Grady MD, 1 Tablet at 05/02/22 0855    docusate sodium (COLACE) capsule 100 mg, 100 mg, Oral, DAILY, Mustapha Grady MD, 100 mg at 05/02/22 0855    pantoprazole (PROTONIX) tablet 20 mg, 20 mg, Oral, ACB, Mustapha Grady MD, 20 mg at 05/02/22 0625    metFORMIN (GLUCOPHAGE) tablet 500 mg, 500 mg, Oral, BID WITH MEALS, Mustapha Grady MD, 500 mg at 05/02/22 0856    pregabalin (LYRICA) capsule 50 mg, 50 mg, Oral, BID, Mustapha Grady MD, 50 mg at 05/02/22 0856    alogliptin (NESINA) tablet 12.5 mg, 12.5 mg, Oral, DAILY, Mustapha Grady MD, 12.5 mg at 05/02/22 0855    acetaminophen (TYLENOL) tablet 500 mg, 500 mg, Oral, Q4H PRN, Leonila Cardenas MD    . PHARMACY TO SUBSTITUTE PER PROTOCOL (Reordered from: estradioL (ESTRACE) 0.01 % (0.1 mg/gram) vaginal cream), , , Per Protocol, Adan ROGERS MD    ketoconazole (NIZORAL) 2 % cream, , Topical, DAILY, Adan ROGERS MD, Given at 05/01/22 0829    sodium chloride (NS) flush 5-40 mL, 5-40 mL, IntraVENous, Q8H, Mustapha Grady MD, 10 mL at 05/02/22 0646    sodium chloride (NS) flush 5-40 mL, 5-40 mL, IntraVENous, PRN, Mustapha Grady MD    naloxone (NARCAN) injection 0.4 mg, 0.4 mg, IntraVENous, PRN, Zita Salamanca MD    senna-docusate (PERICOLACE) 8.6-50 mg per tablet 1 Tablet, 1 Tablet, Oral, BID, Dang ROGERS MD, 1 Tablet at 05/02/22 0855    polyethylene glycol (MIRALAX) packet 17 g, 17 g, Oral, DAILY, Mustapha Grady MD, 17 g at 05/02/22 0856    bisacodyL (DULCOLAX) suppository 10 mg, 10 mg, Rectal, DAILY PRN, Dang ROGERS MD, 10 mg at 05/01/22 1543    acetaminophen (TYLENOL) tablet 650 mg, 650 mg, Oral, Q6H, Mustapha Grady MD, 650 mg at 05/02/22 1137    traMADoL (ULTRAM) tablet 50 mg, 50 mg, Oral, Q6H PRN, Venessa Grady MD, 50 mg at 05/02/22 0316    0.9% sodium chloride infusion, 75 mL/hr, IntraVENous, CONTINUOUS, Adriane Velasco MD, Stopped at 04/29/22 1853  No results found for this or any previous visit (from the past 24 hour(s)). XR PELV AP ONLY   Final Result   Apparent satisfactory postoperative appearance. XR FLUOROSCOPY UNDER 60 MINUTES   Final Result   Intraoperative study as above. XR FEMUR RT 2 VS   Final Result   Findings/impression:   The patient is status post prior placement of a 3 component right total knee   prosthesis. There is increased radiolucency at the bone metallic interface of   the tibial component which may indicate a mild degree of chronic loosening. There also is a mild to moderate varus angle of the tibial tray relative to a   line drawn perpendicular to the tibial diaphysis. There is an acute transcervical femoral neck fracture with moderate displacement   and angulation at the fracture site. This was described on a recent CT   evaluation of the pelvis. This examination is negative for additional fractures throughout the remainder   of the right femur. XR CHEST PORT   Final Result   1. IMPRESSION: No acute abnormality identified.       CT PELV WO CONT   Final Result   1. There is an acute displaced mid (transcervical) fracture of the right   femoral neck. 2.  There are older fractures involving the right superior and ischiopubic rami. 3.  The patient status post prior placement of a left bipolar hip prosthesis. Please see the above text for further details. HEENT: Normocephalic atraumatic  Neck: No distended neck vein  Lungs: Clear bilaterally no wheeze  Heart: Regular  Abdomen: Soft soft positive bowel sounds  Lower Extremities: No edema. Varicosities only. CNS: Alert and oriented follows command  Psych: Cooperative     Assessment:   Anemia with a decreased H&H acute blood loss patient   Fracture right hip status post right hemiarthroplasty   Hypertension   Diabetes with hyperglycemia   Hypertension presently stable   History of osteoporosis on Prolia shot         Plan: Start iron. Encourage p.o. intake. Hopefully be able to go to [facility] for rehab. Fall precautions.

## 2022-05-02 NOTE — PROGRESS NOTES
Problem: Falls - Risk of  Goal: *Absence of Falls  Description: Document Shelly Jimenez Fall Risk and appropriate interventions in the flowsheet. Outcome: Progressing Towards Goal  Note: Fall Risk Interventions:  Mobility Interventions: Bed/chair exit alarm,PT Consult for mobility concerns    Mentation Interventions: Bed/chair exit alarm    Medication Interventions: Bed/chair exit alarm,Patient to call before getting OOB,Teach patient to arise slowly    Elimination Interventions: Call light in reach    History of Falls Interventions: Bed/chair exit alarm,Door open when patient unattended         Problem: Patient Education: Go to Patient Education Activity  Goal: Patient/Family Education  Outcome: Progressing Towards Goal     Problem: Pressure Injury - Risk of  Goal: *Prevention of pressure injury  Description: Document Fabian Scale and appropriate interventions in the flowsheet.   Outcome: Progressing Towards Goal  Note: Pressure Injury Interventions:  Sensory Interventions: Assess changes in LOC,Minimize linen layers    Moisture Interventions: Absorbent underpads,Minimize layers    Activity Interventions: Increase time out of bed,PT/OT evaluation    Mobility Interventions: HOB 30 degrees or less    Nutrition Interventions: Document food/fluid/supplement intake    Friction and Shear Interventions: Minimize layers                Problem: Patient Education: Go to Patient Education Activity  Goal: Patient/Family Education  Outcome: Progressing Towards Goal     Problem: Pain  Goal: *Control of Pain  Outcome: Progressing Towards Goal  Goal: *PALLIATIVE CARE:  Alleviation of Pain  Outcome: Progressing Towards Goal     Problem: Patient Education: Go to Patient Education Activity  Goal: Patient/Family Education  Outcome: Progressing Towards Goal     Problem: Patient Education: Go to Patient Education Activity  Goal: Patient/Family Education  Outcome: Progressing Towards Goal

## 2022-05-02 NOTE — PROGRESS NOTES
Progress Note      5/2/2022 4:25 PM  NAME: Masrha Salamanca   MRN:  [id number]   Admit Diagnosis: Hip fracture ([facility] [S72.009A]      Subjective:   Chart reviewed. Discussed with the nurse. Patient had a surgery performed. She feels good. Her hip pain is still there but significantly improved. Review of Systems:    Symptom Y/N Comments  Symptom Y/N Comments   Fever/Chills n   Chest Pain n    Poor Appetite    Edema     Cough    Abdominal Pain     Sputum    Joint Pain y  hip pain is better   SOB/CARBONE n   Pruritis/Rash     Nausea/vomit    Other     Diarrhea         Constipation           Could NOT obtain due to:      Objective:       Physical Exam:    Last 24hrs VS reviewed since prior progress note. Most recent are:    Visit Vitals  BP (!) 142/62 (BP 1 Location: Left upper arm, BP Patient Position: Semi fowlers; At rest)   Pulse 80   Temp 97.5 °F (36.4 °C)   Resp 16   Ht 5' 4\" (1.626 m)   Wt 57.3 kg (126 lb 4.8 oz)   SpO2 93%   Breastfeeding No   BMI 21.68 kg/m²       Intake/Output Summary (Last 24 hours) at 5/2/2022 1150  Last data filed at 5/2/2022 0631  Gross per 24 hour   Intake --   Output 1501 ml   Net -1501 ml        General Appearance: Well developed, well nourished, alert & oriented x 3,    no acute distress. Ears/Nose/Mouth/Throat: Hearing grossly normal.  Stigmata of old stroke with the left-sided facial asymmetry  Neck: Supple. Chest: Lungs clear to auscultation bilaterally. Cardiovascular: Regular rate and rhythm, S1,S2 normal, no murmur. Abdomen: Soft, non-tender, bowel sounds are active. Extremities: No edema bilaterally. Skin: Warm and dry. []         Post-cath site without hematoma, bruit, tenderness, or thrill. Distal pulses intact.     PMH/SH reviewed - no change compared to H&P    Data Review    Telemetry: normal sinus rhythm     EKG:   []  No new EKG for review    Lab Data Personally Reviewed:    Recent Labs     05/01/22  0557 04/30/22  0619   WBC 7.9 7.6   HGB 9.3* 10.5*   HCT 29.4* 33.3*    174     No results for input(s): INR, PTP, APTT, INREXT, INREXT in the last 72 hours. Recent Labs     05/01/22 0557 04/30/22 0619    144   K 3.3* 3.7   * 111*   CO2 24 26   BUN 17 17   CREA 0.56 0.59   * 313*   CA 8.2* 7.7*     No results for input(s): CPK, CKNDX, TROIQ in the last 72 hours. No lab exists for component: CPKMB  No results found for: CHOL, CHOLX, CHLST, CHOLV, HDL, HDLP, LDL, LDLC, DLDLP, TGLX, TRIGL, TRIGP, CHHD, CHHDX    Recent Labs     05/01/22 0557 04/30/22 0619   AP 47 54   TP 5.0* 5.5*   ALB 2.4* 2.8*   GLOB 2.6 2.7     No results for input(s): PH, PCO2, PO2 in the last 72 hours. Medications Personally Reviewed:    Current Facility-Administered Medications   Medication Dose Route Frequency    potassium chloride SR (KLOR-CON 10) tablet 40 mEq  40 mEq Oral NOW    amLODIPine (NORVASC) tablet 10 mg  10 mg Oral DAILY    atorvastatin (LIPITOR) tablet 20 mg  20 mg Oral DAILY    calcium-vitamin D 600 mg-5 mcg (200 unit) per tablet 1 Tablet  1 Tablet Oral DAILY    docusate sodium (COLACE) capsule 100 mg  100 mg Oral DAILY    pantoprazole (PROTONIX) tablet 20 mg  20 mg Oral ACB    metFORMIN (GLUCOPHAGE) tablet 500 mg  500 mg Oral BID WITH MEALS    pregabalin (LYRICA) capsule 50 mg  50 mg Oral BID    alogliptin (NESINA) tablet 12.5 mg  12.5 mg Oral DAILY    acetaminophen (TYLENOL) tablet 500 mg  500 mg Oral Q4H PRN    . PHARMACY TO SUBSTITUTE PER PROTOCOL (Reordered from: estradioL (ESTRACE) 0.01 % (0.1 mg/gram) vaginal cream)    Per Protocol    ketoconazole (NIZORAL) 2 % cream   Topical DAILY    sodium chloride (NS) flush 5-40 mL  5-40 mL IntraVENous Q8H    sodium chloride (NS) flush 5-40 mL  5-40 mL IntraVENous PRN    naloxone (NARCAN) injection 0.4 mg  0.4 mg IntraVENous PRN    senna-docusate (PERICOLACE) 8.6-50 mg per tablet 1 Tablet  1 Tablet Oral BID    polyethylene glycol (MIRALAX) packet 17 g  17 g Oral DAILY    bisacodyL (DULCOLAX) suppository 10 mg  10 mg Rectal DAILY PRN    acetaminophen (TYLENOL) tablet 650 mg  650 mg Oral Q6H    traMADoL (ULTRAM) tablet 50 mg  50 mg Oral Q6H PRN    0.9% sodium chloride infusion  75 mL/hr IntraVENous CONTINUOUS           Problem List:   Hypertension. Diabetes mellitus type 2. History of stroke. Fracture of the right hip and patient had open reduction and internal fixation and is clinically doing well. Echocardiogram revealed ejection fraction is normal.  Hypokalemia. 1.      Assessment/Plan:   Will give potassium chloride supplement. Probably will continue present care and a physical therapy and rehabilitation as per orthopedics. Thank you. 1.          []       High complexity decision making was performed in this patient at high risk for decompensation with multiple organ involvement.     Yoselin Ch MD

## 2022-05-02 NOTE — PROGRESS NOTES
Oleary removed around 7am. Patient has not voided. Bladder scanned the patient @ 3pm and had the patient had 150mL.      Patient voided 100 mL @ 36

## 2022-05-02 NOTE — PROGRESS NOTES
OCCUPATIONAL THERAPY TREATMENT  Patient: Linda Lockhart (22 y.o. female)  Date: 5/2/2022  Diagnosis: Hip fracture ([facility] [S72.009A] <principal problem not specified>  Procedure(s) (LRB):  RIGHT HIP HEMIARTHROPLASTY (Right) 3 Days Post-Op  Precautions:    Chart, occupational therapy assessment, plan of care, and goals were reviewed. ASSESSMENT  Patient continues with skilled OT services and is progressing towards goals. Upon OTOOLE arrival, pt sitting in recliner and agreeable to tx session. Pt completed sit>stand from recliner with Celso using RW for balance upon standing. Pt completed few steps to [facility] and completed transfer with CGA. Pt completed transfer from [facility] with Celso and ambulated to standing in front of recliner. Pt completed standing oral hygiene and face washing with CGA. Pt noted with good static standing balance and fair dynamic standing balance. Pt reporting no pain throughout activity. Pt returned to sitting in chair with CGA/Celso. Pt completed seated hair brushing with setup A. Pt left sitting in recliner with call bell within reach and needs met. Will continue to follow pt throughout remainder of stay and progress towards OT goals. Recommending IRF at discharge when medically appropriate. Other factors to consider for discharge: family/social support, DME, time since onset, severity of deficits, decline from functional baseline         PLAN :  Patient continues to benefit from skilled intervention to address the above impairments. Continue treatment per established plan of care. to address goals. Recommendation for discharge: (in order for the patient to meet his/her long term goals)  [address]     This discharge recommendation:  Has been made in collaboration with the attending provider and/or case management    IF patient discharges home will need the following DME: TBD       SUBJECTIVE:   Patient stated I don't have any pain right now.     OBJECTIVE DATA SUMMARY:   Cognitive/Behavioral Status:  Neurologic State: Alert  Orientation Level: Oriented X4  Cognition: Follows commands    Functional Mobility and Transfers for ADLs:    Transfers:  Sit to Stand: Minimum assistance  Functional Transfers  Toilet Transfer : Contact guard assistance;Minimum assistance    Balance:  Sitting: Intact; Without support  Standing: Impaired; With support  Standing - Static: Constant support;Good  Standing - Dynamic : Constant support; Fair    ADL Intervention:  Grooming  Position Performed: Seated in chair;Standing  Washing Face: Contact guard assistance  Brushing Teeth: Contact guard assistance  Brushing/Combing Hair: Set-up    Pain:  0/10 at end of session    Activity Tolerance:   Fair and requires rest breaks  Please refer to the flowsheet for vital signs taken during this treatment. After treatment patient left in no apparent distress:   Sitting in chair and Call bell within reach    COMMUNICATION/COLLABORATION:   The patients plan of care was discussed with: Physical therapy assistant.      XIANG Farrar  Time Calculation: 24 mins    Problem: Self Care Deficits Care Plan (Adult)  Goal: *Acute Goals and Plan of Care (Insert Text)  Description: Pt stated goal 'to get better'  Pt will be Mod I sup <> sit in prep for EOB ADLs  Pt will be Mod I grooming standing sink side LRAD  Pt will be Mod I UB dressing sitting EOB/long sit   Pt will be Mod I LE dressing sitting EOB/long sit  Pt will be Mod I sit <>  prep for toileting LRAD  Pt will be Mod I toileting/toilet transfer/cloth mgmt LRAD  Pt will be IND following UE HEP in prep for self care tasks      Outcome: Progressing Towards Goal

## 2022-05-02 NOTE — PROGRESS NOTES
Spoke with patient about discharge plan and recommendations made my PT/OT for IRF. Patient is interested in IRF/[facility] for discharge. referral sent via [employer] and informed them that she is ready for discharge if they are able to accept patient today. Clinicals sent via [employer]. 1350: patient accepted at [facility] and rehab for IRF. No beds available today, will have bed available tomorrow, and if one does become available today they can accept.

## 2022-05-03 LAB
GLUCOSE BLD STRIP.AUTO-MCNC: 151 MG/DL (ref 65–117)
GLUCOSE BLD STRIP.AUTO-MCNC: 186 MG/DL (ref 65–117)
GLUCOSE BLD STRIP.AUTO-MCNC: 200 MG/DL (ref 65–117)
GLUCOSE BLD STRIP.AUTO-MCNC: 212 MG/DL (ref 65–117)
PERFORMED BY, TECHID: ABNORMAL

## 2022-05-03 PROCEDURE — 74011250637 HC RX REV CODE- 250/637: Performed by: PHYSICIAN ASSISTANT

## 2022-05-03 PROCEDURE — 74011250637 HC RX REV CODE- 250/637: Performed by: INTERNAL MEDICINE

## 2022-05-03 PROCEDURE — 65270000029 HC RM PRIVATE

## 2022-05-03 PROCEDURE — 97530 THERAPEUTIC ACTIVITIES: CPT

## 2022-05-03 PROCEDURE — 97110 THERAPEUTIC EXERCISES: CPT

## 2022-05-03 PROCEDURE — 74011000250 HC RX REV CODE- 250: Performed by: ORTHOPAEDIC SURGERY

## 2022-05-03 PROCEDURE — 97116 GAIT TRAINING THERAPY: CPT

## 2022-05-03 PROCEDURE — 82962 GLUCOSE BLOOD TEST: CPT

## 2022-05-03 PROCEDURE — 74011250637 HC RX REV CODE- 250/637: Performed by: ORTHOPAEDIC SURGERY

## 2022-05-03 RX ORDER — ALOGLIPTIN 12.5 MG/1
25 TABLET, FILM COATED ORAL DAILY
Status: DISCONTINUED | OUTPATIENT
Start: 2022-05-04 | End: 2022-05-04 | Stop reason: HOSPADM

## 2022-05-03 RX ADMIN — PANTOPRAZOLE SODIUM 20 MG: 20 TABLET, DELAYED RELEASE ORAL at 08:59

## 2022-05-03 RX ADMIN — FERROUS SULFATE TAB 325 MG (65 MG ELEMENTAL FE) 325 MG: 325 (65 FE) TAB at 08:59

## 2022-05-03 RX ADMIN — PREGABALIN 50 MG: 50 CAPSULE ORAL at 20:35

## 2022-05-03 RX ADMIN — SENNOSIDES AND DOCUSATE SODIUM 1 TABLET: 50; 8.6 TABLET ORAL at 20:35

## 2022-05-03 RX ADMIN — ATORVASTATIN CALCIUM 20 MG: 20 TABLET, FILM COATED ORAL at 08:58

## 2022-05-03 RX ADMIN — SODIUM CHLORIDE, PRESERVATIVE FREE 10 ML: 5 INJECTION INTRAVENOUS at 05:19

## 2022-05-03 RX ADMIN — SODIUM CHLORIDE, PRESERVATIVE FREE 10 ML: 5 INJECTION INTRAVENOUS at 21:05

## 2022-05-03 RX ADMIN — POLYETHYLENE GLYCOL 3350 17 G: 17 POWDER, FOR SOLUTION ORAL at 08:59

## 2022-05-03 RX ADMIN — AMLODIPINE BESYLATE 10 MG: 5 TABLET ORAL at 08:59

## 2022-05-03 RX ADMIN — ACETAMINOPHEN 650 MG: 325 TABLET ORAL at 05:17

## 2022-05-03 RX ADMIN — Medication 1 TABLET: at 08:59

## 2022-05-03 RX ADMIN — PREGABALIN 50 MG: 50 CAPSULE ORAL at 08:58

## 2022-05-03 RX ADMIN — METFORMIN HYDROCHLORIDE 500 MG: 500 TABLET ORAL at 17:26

## 2022-05-03 RX ADMIN — ASPIRIN 325 MG: 325 TABLET, COATED ORAL at 20:35

## 2022-05-03 RX ADMIN — METFORMIN HYDROCHLORIDE 500 MG: 500 TABLET ORAL at 08:59

## 2022-05-03 RX ADMIN — TRAMADOL HYDROCHLORIDE 50 MG: 50 TABLET, COATED ORAL at 22:43

## 2022-05-03 RX ADMIN — DOCUSATE SODIUM 100 MG: 100 CAPSULE, LIQUID FILLED ORAL at 08:58

## 2022-05-03 RX ADMIN — ACETAMINOPHEN 650 MG: 325 TABLET ORAL at 17:25

## 2022-05-03 RX ADMIN — ASPIRIN 325 MG: 325 TABLET, COATED ORAL at 08:59

## 2022-05-03 RX ADMIN — SENNOSIDES AND DOCUSATE SODIUM 1 TABLET: 50; 8.6 TABLET ORAL at 08:59

## 2022-05-03 RX ADMIN — ALOGLIPTIN 12.5 MG: 12.5 TABLET, FILM COATED ORAL at 08:59

## 2022-05-03 NOTE — PROGRESS NOTES
Progress Note      5/3/2022 4:25 PM  NAME: Rodri Webster   MRN:  [id number]   Admit Diagnosis: Hip fracture ([facility] [address] [S72.009A]      Subjective:   Chart reviewed. Patient had a surgery performed. She feels good. Her hip pain is still there but significantly improved. Review of Systems:    Symptom Y/N Comments  Symptom Y/N Comments   Fever/Chills n   Chest Pain n    Poor Appetite    Edema     Cough    Abdominal Pain     Sputum    Joint Pain y  hip pain is better   SOB/CARBONE n   Pruritis/Rash     Nausea/vomit    Other     Diarrhea         Constipation           Could NOT obtain due to:      Objective:       Physical Exam:    Last 24hrs VS reviewed since prior progress note. Most recent are:    Visit Vitals  BP (!) 159/66   Pulse 79   Temp 97.9 °F (36.6 °C)   Resp 16   Ht 5' 4\" (1.626 m)   Wt 57.3 kg (126 lb 4.8 oz)   SpO2 93%   Breastfeeding No   BMI 21.68 kg/m²       Intake/Output Summary (Last 24 hours) at 5/3/2022 1240  Last data filed at 5/2/2022 1745  Gross per 24 hour   Intake --   Output 100 ml   Net -100 ml        General Appearance: Well developed, well nourished, alert & oriented x 3,    no acute distress. Ears/Nose/Mouth/Throat: Hearing grossly normal.  Stigmata of old stroke with the left-sided facial asymmetry  Neck: Supple. Chest: Lungs clear to auscultation bilaterally. Cardiovascular: Regular rate and rhythm, S1,S2 normal, no murmur. Abdomen: Soft, non-tender, bowel sounds are active. Extremities: No edema bilaterally. Skin: Warm and dry. []         Post-cath site without hematoma, bruit, tenderness, or thrill. Distal pulses intact. PMH/SH reviewed - no change compared to H&P    Data Review    Telemetry: normal sinus rhythm     EKG:   []  No new EKG for review    Lab Data Personally Reviewed:    Recent Labs     05/01/22 0531   WBC 7.9   HGB 9.3*   HCT 29.4*        No results for input(s): INR, PTP, APTT, INREXT, INREXT in the last 72 hours.    Recent Labs     05/01/22  0502    K 3.3*   *   CO2 24   BUN 17   CREA 0.56   *   CA 8.2*     No results for input(s): CPK, CKNDX, TROIQ in the last 72 hours. No lab exists for component: CPKMB  No results found for: CHOL, CHOLX, CHLST, CHOLV, HDL, HDLP, LDL, LDLC, DLDLP, TGLX, TRIGL, TRIGP, CHHD, CHHDX    Recent Labs     05/01/22  0557   AP 47   TP 5.0*   ALB 2.4*   GLOB 2.6     No results for input(s): PH, PCO2, PO2 in the last 72 hours. Medications Personally Reviewed:    Current Facility-Administered Medications   Medication Dose Route Frequency    [START ON 5/4/2022] alogliptin (NESINA) tablet 25 mg  25 mg Oral DAILY    aspirin delayed-release tablet 325 mg  325 mg Oral BID    ferrous sulfate tablet 325 mg  1 Tablet Oral DAILY WITH BREAKFAST    amLODIPine (NORVASC) tablet 10 mg  10 mg Oral DAILY    atorvastatin (LIPITOR) tablet 20 mg  20 mg Oral DAILY    calcium-vitamin D 600 mg-5 mcg (200 unit) per tablet 1 Tablet  1 Tablet Oral DAILY    docusate sodium (COLACE) capsule 100 mg  100 mg Oral DAILY    pantoprazole (PROTONIX) tablet 20 mg  20 mg Oral ACB    metFORMIN (GLUCOPHAGE) tablet 500 mg  500 mg Oral BID WITH MEALS    pregabalin (LYRICA) capsule 50 mg  50 mg Oral BID    acetaminophen (TYLENOL) tablet 500 mg  500 mg Oral Q4H PRN    . PHARMACY TO SUBSTITUTE PER PROTOCOL (Reordered from: estradioL (ESTRACE) 0.01 % (0.1 mg/gram) vaginal cream)    Per Protocol    ketoconazole (NIZORAL) 2 % cream   Topical DAILY    sodium chloride (NS) flush 5-40 mL  5-40 mL IntraVENous Q8H    sodium chloride (NS) flush 5-40 mL  5-40 mL IntraVENous PRN    naloxone (NARCAN) injection 0.4 mg  0.4 mg IntraVENous PRN    senna-docusate (PERICOLACE) 8.6-50 mg per tablet 1 Tablet  1 Tablet Oral BID    polyethylene glycol (MIRALAX) packet 17 g  17 g Oral DAILY    bisacodyL (DULCOLAX) suppository 10 mg  10 mg Rectal DAILY PRN    acetaminophen (TYLENOL) tablet 650 mg  650 mg Oral Q6H    traMADoL (ULTRAM) tablet 50 mg  50 mg Oral Q6H PRN           Problem List:   Hypertension. Diabetes mellitus type 2. History of stroke. Fracture of the right hip and patient had open reduction and internal fixation and is clinically doing well. Echocardiogram revealed ejection fraction is normal.    1.      Assessment/Plan:   Patient will be transferred for rehabilitation today. Probably will continue present care and a physical therapy and rehabilitation as per orthopedics. Thank you. 1.          []       High complexity decision making was performed in this patient at high risk for decompensation with multiple organ involvement.     Jonah Triana MD

## 2022-05-03 NOTE — PROGRESS NOTES
Patient with discharge orders for [facility]. Patient accepted with [facility] but currently they do not have a bed available for patient. They did not have the discharges they expected today at [facility]. States they will be able to accept patient tomorrow, 05/04/2022 at 2pm. Will follow back up with them tomorrow for bed availability.

## 2022-05-03 NOTE — DISCHARGE SUMMARY
Discharge Summary     Danni Laguna Date:   4/28/2022  4:37 PM  Discharge Date:   5/3/2022   discharge Condition:    Improved, stable  Discharge Diagnosis  Problem List Items Addressed This Visit     None      Visit Diagnoses     Closed fracture of right hip, initial encounter ([facility] [address]    -  Primary      Anemia with a decreased H&H acute blood loss patient   Fracture right hip status post right hemiarthroplasty   Hypertension   Diabetes with hyperglycemia   Hypertension presently stable   History of osteoporosis on Prolia shot        Hospital Stay  Narrative of Hospital Course: H&P for full details. Briefly this is a 80-year-old  female with history of osteoporosis hypertension diabetes presents status post fall right hip fracture admitted for further care. Hospital course patient was admitted to the hospital.  He was seen by cardiologist for cardiac clearance. She underwent successful hemiarthroplasty of the right hip. She has been started on physical therapy. Her medications were continued he has DVT peripheral.  She is hemodynamically stable and will be transferred to rehab once bed is available. She has anemia due to fracture and postop. She has been started on stool softener and oriented. Vitals stable  On examination she is comfortable in no distress  HEENT normocephalic atraumatic  Neck prominent no neck vein distention  Lungs are clear bilaterally no wheeze  Heart S1-S2 regular  Abdomen soft nontender nondistended positive bowel sounds  Lower extremities with varicosities.   Right hip dressing is clean dry and intact  CNS alert and oriented follows command    Psych cooperative    Impression stable for discharge when the bed is available      Consultants:  Cardiologist  Orthopedics     Surgeries/procedures Performed:  Right hemiarthroplasty         Discharge Plan:    [id number] [facility]/Incidental Findings Requiring Follow Up:     Patient Instructions:       Diet: DIET ADULT     Activity: With physical therapy fall precautions  For number of days (if applicable): Other Instructions:      Provider Follow-Up:   With PCP after discharge from ER  Follow-up Appointments   Procedures    FOLLOW UP VISIT Appointment in: Two Weeks     Standing Status:   Standing     Number of Occurrences:   1     Order Specific Question:   Appointment in     Answer: Two Weeks        Significant Diagnostic Studies:     XR PELV AP ONLY   Final Result   Apparent satisfactory postoperative appearance. XR FLUOROSCOPY UNDER 60 MINUTES   Final Result   Intraoperative study as above. XR FEMUR RT 2 VS   Final Result   Findings/impression:   The patient is status post prior placement of a 3 component right total knee   prosthesis. There is increased radiolucency at the bone metallic interface of   the tibial component which may indicate a mild degree of chronic loosening. There also is a mild to moderate varus angle of the tibial tray relative to a   line drawn perpendicular to the tibial diaphysis. There is an acute transcervical femoral neck fracture with moderate displacement   and angulation at the fracture site. This was described on a recent CT   evaluation of the pelvis. This examination is negative for additional fractures throughout the remainder   of the right femur. XR CHEST PORT   Final Result   1. IMPRESSION: No acute abnormality identified. CT PELV WO CONT   Final Result   1. There is an acute displaced mid (transcervical) fracture of the right   femoral neck. 2.  There are older fractures involving the right superior and ischiopubic rami. 3.  The patient status post prior placement of a left bipolar hip prosthesis. Please see the above text for further details.           Recent Results (from the past 24 hour(s))   GLUCOSE, POC    Collection Time: 05/02/22  4:27 PM   Result Value Ref Range    Glucose (POC) 190 (H) 65 - 117 mg/dL    Performed by Errol Centeno [place]    GLUCOSE, POC    Collection Time: 05/02/22  8:46 PM   Result Value Ref Range    Glucose (POC) 190 (H) 65 - 117 mg/dL    Performed by Sandy Vivas    GLUCOSE, POC    Collection Time: 05/03/22  7:35 AM   Result Value Ref Range    Glucose (POC) 200 (H) 65 - 117 mg/dL    Performed by Demarcus Villalta        Discharge Medications:  Current Discharge Medication List      START taking these medications    Details   ferrous sulfate 325 mg (65 mg iron) tablet Take 1 Tablet by mouth daily (with breakfast) for 30 days. Qty: 30 Tablet, Refills: 0  Start date: 5/3/2022, End date: 6/2/2022      senna-docusate (PERICOLACE) 8.6-50 mg per tablet Take 1 Tablet by mouth two (2) times a day for 30 days. Qty: 60 Tablet, Refills: 0  Start date: 5/2/2022, End date: 6/1/2022         CONTINUE these medications which have NOT CHANGED    Details   diclofenac (VOLTAREN) 1 % gel Apply  to affected area as needed for Pain. amLODIPine (NORVASC) 10 mg tablet Take 10 mg by mouth daily. aspirin delayed-release 81 mg tablet Take 81 mg by mouth daily. atorvastatin (LIPITOR) 20 mg tablet Take 20 mg by mouth daily. calcium-cholecalciferol, D3, (CALTRATE 600+D) tablet Take 1 Tab by mouth daily. SITagliptin (Januvia) 50 mg tablet Take 50 mg by mouth daily. pregabalin (LYRICA) 100 mg capsule Take  by mouth two (2) times a day. metFORMIN (GLUCOPHAGE) 500 mg tablet Take 500 mg by mouth two (2) times daily (with meals). estradioL (ESTRACE) 0.01 % (0.1 mg/gram) vaginal cream Insert 2 g into vagina. Twice a week      medical supply, miscellaneous (OCUSOFT LID SCRUB) by Does Not Apply route. As needed      ketoconazole (NIZORAL) 2 % topical cream Apply  to affected area daily. acetaminophen (Tylenol Extra Strength) 500 mg tablet Take 500 mg by mouth five (5) times daily. sodium chloride-aloe vera ([place] Saline) topical gel Apply  to affected area once.       docusate sodium (Colace) 100 mg capsule Take 100 mg by mouth as needed. esomeprazole (NEXIUM) 40 mg capsule Take  by mouth daily.          STOP taking these medications       losartan-hydroCHLOROthiazide (HYZAAR) 50-12.5 mg per tablet Comments:   Reason for Stopping:         valsartan-hydroCHLOROthiazide (DIOVAN-HCT) 160-12.5 mg per tablet Comments:   Reason for Stopping:                Time Spent on Discharge:

## 2022-05-03 NOTE — PROGRESS NOTES
Physician Progress Note      Karen VALENCIA #:                  [id number]  :                       1923  ADMIT DATE:       2022 4:37 PM  [address] [place] DATE:  RESPONDING  PROVIDER #:        Hiwot Pacheco MD          QUERY TEXT:    Patient admitted with hip fracture, noted to have positive UA and urine culture. If possible, please document in progress notes and discharge summary if you are evaluating and/or treating any of the following: The medical record reflects the following:  Risk Factors: 80year old female, mild incontinence  Clinical Indicators:  Urine culture -  Escherichia coli   UA - Leukocyte Esterase Large, Urine WBC >100  RN flowsheet - Urine color Yellow/Straw, appearance Hazy  Treatment: Pt received 2 doses of IV Cefazolin for surgical prophylaxis      Please email [email or web address] with any questions  Options provided:  -- UTI  -- UTI not clinically significant  -- Other - I will add my own diagnosis  -- Disagree - Not applicable / Not valid  -- Disagree - Clinically unable to determine / Unknown  -- Refer to Clinical Documentation Reviewer    PROVIDER RESPONSE TEXT:    UTI is not clinically significant. Query created by:  Karyle Gallo on 5/3/2022 6:30 AM      Electronically signed by:  Hiwot Pacheco MD 5/3/2022 8:18 AM

## 2022-05-03 NOTE — PROGRESS NOTES
OCCUPATIONAL THERAPY TREATMENT  Patient: Ebenezer Haynes (7 y.o. female)  Date: 5/3/2022  Diagnosis: Hip fracture ([address] [place] <principal problem not specified>  Procedure(s) (LRB):  RIGHT HIP HEMIARTHROPLASTY (Right) 4 Days Post-Op  Precautions:    Chart, occupational therapy assessment, plan of care, and goals were reviewed. ASSESSMENT  Patient continues with skilled OT services and is progressing towards goals. Upon OTOOLE/PTA arrival, pt semi supine in bed and agreeable to tx session. Pt completed bed mobility to L side with Celso rolling, Celso supine>sit, and CGA scooting to EOB. Pt required seated rest break at EOB prior to standing. Pt completed sit>stand from EOB with Celso using RW for balance upon standing. Pt completed ambulation with CGA to toilet in bathroom, pt noted to urinate on floor during ambulation. Pt completed toilet transfer with CGA onto low toilet. Pt completed seated bladder hygiene with SBA. Seated doffing/donning of clean socks completed with total A due to limited anterior reach and maintaining hip precautions. Pt completed sit>stand from toilet with Celso and ambulated to recliner in room with CGA. Pt completed seated hair brushing and hand hygiene in recliner with setup A. Pt left sitting in recliner with breakfast tray in front of her, call bell within reach, and all needs met. Will continue to follow pt throughout remainder of stay and progress towards OT goals. Recommending IRF at discharge when medically appropriate. Other factors to consider for discharge: family/social support, DME, time since onset, severity of deficits, decline from functional baseline         PLAN :  Patient continues to benefit from skilled intervention to address the above impairments. Continue treatment per established plan of care. to address goals.     Recommendation for discharge: (in order for the patient to meet his/her long term goals)  [address]     This discharge recommendation:  Has been made in collaboration with the attending provider and/or case management    IF patient discharges home will need the following DME: TBD       SUBJECTIVE:   Patient stated I need to go to the bathroom.     OBJECTIVE DATA SUMMARY:   Cognitive/Behavioral Status:  Neurologic State: Alert  Orientation Level: Oriented X4  Cognition: Follows commands    Functional Mobility and Transfers for ADLs:  Bed Mobility:  Rolling: Minimum assistance  Supine to Sit: Minimum assistance  Scooting: Contact guard assistance    Transfers:  Sit to Stand: Minimum assistance  Functional Transfers  Bathroom Mobility: Contact guard assistance  Toilet Transfer : Minimum assistance  Bed to Chair: Contact guard assistance    Balance:  Sitting: Intact; Without support  Standing: Impaired; With support  Standing - Static: Constant support;Good  Standing - Dynamic : Constant support; Fair    ADL Intervention:  Grooming  Position Performed: Seated in chair  Washing Hands: Set-up  Brushing/Combing Hair: Set-up    Lower Body Dressing Assistance  Socks: Total assistance (dependent)    Toileting  Bladder Hygiene: Stand-by assistance    Pain:  0/10    Activity Tolerance:   Fair and requires rest breaks  Please refer to the flowsheet for vital signs taken during this treatment. After treatment patient left in no apparent distress:   Sitting in chair and Call bell within reach    COMMUNICATION/COLLABORATION:   The patients plan of care was discussed with: Registered nurse.      XIANG Paige  Time Calculation: 25 mins    Problem: Self Care Deficits Care Plan (Adult)  Goal: *Acute Goals and Plan of Care (Insert Text)  Description: Pt stated goal 'to get better'  Pt will be Mod I sup <> sit in prep for EOB ADLs  Pt will be Mod I grooming standing sink side LRAD  Pt will be Mod I UB dressing sitting EOB/long sit   Pt will be Mod I LE dressing sitting EOB/long sit  Pt will be Mod I sit <>  prep for toileting LRAD  Pt will be Mod I toileting/toilet transfer/cloth mgmt LRAD  Pt will be IND following UE HEP in prep for self care tasks      Outcome: Progressing Towards Goal

## 2022-05-03 NOTE — PROGRESS NOTES
PHYSICAL THERAPY TREATMENT  Patient: Sebastian Willson (43 y.o. female)  Date: 5/3/2022  Diagnosis: Hip fracture ([address] [place] <principal problem not specified>  Procedure(s) (LRB):  RIGHT HIP HEMIARTHROPLASTY (Right) 4 Days Post-Op  Precautions:    Chart, physical therapy assessment, plan of care and goals were reviewed. ASSESSMENT  Patient continues with skilled PT services and is progressing towards goals. Pt seated in recliner upon entry and agreeable to session. Gown changed during session at pt request. Performed STS form recliner with min A and RW for balance upon standing. Pt increased amb distance with RW and CGA for safety. Pt with step to gait pattern and slow gait speed, but no LOB noted. Pt returned to recliner and completed seated therex, see details below. Pt left with breakfast set up, sitting in recliner call bell in reach and needs met. Rec d/c to IRF once medically appropriate. Current Level of Function Impacting Discharge (mobility/balance): assistance required for STS    Other factors to consider for discharge: PLOF, time since onset, severity of deficits, assistance at home         PLAN :  Patient continues to benefit from skilled intervention to address the above impairments. Continue treatment per established plan of care. to address goals. Recommendation for discharge: (in order for the patient to meet his/her long term goals)  [address]     This discharge recommendation:  Has been made in collaboration with the attending provider and/or case management    IF patient discharges home will need the following DME: to be determined (TBD)       SUBJECTIVE:   Patient stated I walked all the way to the bathroom this morning.     OBJECTIVE DATA SUMMARY:   Critical Behavior:  Neurologic State: Alert  Orientation Level: Oriented X4  Cognition: Follows commands    Functional Mobility Training:  Transfers:  Sit to Stand: Minimum assistance  Stand to Sit: Minimum assistance  Bed to Chair: Contact guard assistance    Balance:  Sitting: Intact; Without support  Standing: Impaired; With support  Standing - Static: Constant support;Good  Standing - Dynamic : Constant support; Fair    Ambulation/Gait Training:  Distance (ft): 30 Feet (ft)  Assistive Device: Gait belt;Walker, rolling  Ambulation - Level of Assistance: Contact guard assistance  Base of Support: Narrowed  Speed/Shavon: Slow      Therapeutic Exercises:   1 x 12 LAQ  1 x 12 MARCHES, not past 90 degrees hip flexion on RLE    Pain Ratin/10    Activity Tolerance:   Good and requires rest breaks  Please refer to the flowsheet for vital signs taken during this treatment. After treatment patient left in no apparent distress:   Sitting in chair and Call bell within reach    COMMUNICATION/COLLABORATION:   The patients plan of care was discussed with: Registered nurse. Problem: Mobility Impaired (Adult and Pediatric)  Goal: *Acute Goals and Plan of Care (Insert Text)  Description: Pt stated goal: to get better  Pt will be I with LE HEP in 7 days. Pt will perform bed mobility with mod I in 7 days. Pt will perform transfers with mod I in 7 days. Pt will amb 25-50 feet with LRAD safely with mod I in 7 days. Pt will verbalize and demonstrate compliance with posterior hip precautions to include no abduction, no hip flexion >90 deg, and no IR of right hip in 7 days.        Outcome: Progressing Towards Goal       Edelmiro Galeazzi, PTA   Time Calculation: 26 mins

## 2022-05-03 NOTE — PROGRESS NOTES
Orthopedic progress note    Date:5/3/2022       Room:[facility]  Patient Name:Collette Cleary     YOB: 1923     Age:89 y.o. Subjective    Status post right hip hemiarthroplasty. Postop day #4. Patient sitting up in a chair at bedside. She has no complaints of pain at this time. She is progressing well with therapy. No other complaints at this time. Objective           Vitals Last 24 Hours:  TEMPERATURE:  Temp  Av °F (36.7 °C)  Min: 97.9 °F (36.6 °C)  Max: 98.2 °F (36.8 °C)  RESPIRATIONS RANGE: Resp  Av  Min: 16  Max: 16  PULSE OXIMETRY RANGE: SpO2  Av %  Min: 93 %  Max: 100 %  PULSE RANGE: Pulse  Av.8  Min: 79  Max: 85  BLOOD PRESSURE RANGE: Systolic (81GBP), BWV:740 , Min:139 , CCJ:678   ; Diastolic (51WQL), ALEXANDER:18, Min:52, Max:68    Current Facility-Administered Medications   Medication Dose Route Frequency    [START ON 2022] alogliptin (NESINA) tablet 25 mg  25 mg Oral DAILY    aspirin delayed-release tablet 325 mg  325 mg Oral BID    ferrous sulfate tablet 325 mg  1 Tablet Oral DAILY WITH BREAKFAST    amLODIPine (NORVASC) tablet 10 mg  10 mg Oral DAILY    atorvastatin (LIPITOR) tablet 20 mg  20 mg Oral DAILY    calcium-vitamin D 600 mg-5 mcg (200 unit) per tablet 1 Tablet  1 Tablet Oral DAILY    docusate sodium (COLACE) capsule 100 mg  100 mg Oral DAILY    pantoprazole (PROTONIX) tablet 20 mg  20 mg Oral ACB    metFORMIN (GLUCOPHAGE) tablet 500 mg  500 mg Oral BID WITH MEALS    pregabalin (LYRICA) capsule 50 mg  50 mg Oral BID    acetaminophen (TYLENOL) tablet 500 mg  500 mg Oral Q4H PRN    . PHARMACY TO SUBSTITUTE PER PROTOCOL (Reordered from: estradioL (ESTRACE) 0.01 % (0.1 mg/gram) vaginal cream)    Per Protocol    ketoconazole (NIZORAL) 2 % cream   Topical DAILY    sodium chloride (NS) flush 5-40 mL  5-40 mL IntraVENous Q8H    sodium chloride (NS) flush 5-40 mL  5-40 mL IntraVENous PRN    naloxone (NARCAN) injection 0.4 mg  0.4 mg IntraVENous PRN  senna-docusate (PERICOLACE) 8.6-50 mg per tablet 1 Tablet  1 Tablet Oral BID    polyethylene glycol (MIRALAX) packet 17 g  17 g Oral DAILY    bisacodyL (DULCOLAX) suppository 10 mg  10 mg Rectal DAILY PRN    acetaminophen (TYLENOL) tablet 650 mg  650 mg Oral Q6H    traMADoL (ULTRAM) tablet 50 mg  50 mg Oral Q6H PRN          I/O (24Hr): Intake/Output Summary (Last 24 hours) at 5/3/2022 1006  Last data filed at 5/2/2022 1745  Gross per 24 hour   Intake 420 ml   Output 100 ml   Net 320 ml     Objective  Labs/Imaging/Diagnostics    Labs:  CBC:  Recent Labs     05/01/22  0557   WBC 7.9   RBC 3.21*   HGB 9.3*   HCT 29.4*   MCV 91.6   RDW 14.0        CHEMISTRIES:  Recent Labs     05/01/22  0557      K 3.3*   *   CO2 24   BUN 17   CREA 0.56   CA 8.2*   PT/INR:No results for input(s): INR, INREXT in the last 72 hours. No lab exists for component: PROTIME  APTT:No results for input(s): APTT in the last 72 hours. LIVER PROFILE:  Recent Labs     05/01/22  0557   AST 20   ALT 12     Lab Results   Component Value Date/Time    ALT (SGPT) 12 05/01/2022 05:57 AM    AST (SGOT) 20 05/01/2022 05:57 AM    Alk. phosphatase 47 05/01/2022 05:57 AM    Bilirubin, total 0.4 05/01/2022 05:57 AM       Physical Exam:  Right hip: Billy dressing remains in place. No swelling seen the right thigh. No calf pain to palpation. 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 Postop day #4.   Continue with therapy as tolerated. Continue with aspirin for DVT prophylaxis. Can discharge in orthopedics when cleared by medicine placement has been made. Patient follow-up with Dr. Nova Ferrara as outpatient approximately 10 days.     Electronically signed by Donnell Luque PA-C on 5/3/2022 at 10:06 AM

## 2022-05-04 VITALS
BODY MASS INDEX: 21.56 KG/M2 | DIASTOLIC BLOOD PRESSURE: 52 MMHG | HEART RATE: 79 BPM | HEIGHT: 64 IN | SYSTOLIC BLOOD PRESSURE: 157 MMHG | TEMPERATURE: 97.8 F | WEIGHT: 126.3 LBS | OXYGEN SATURATION: 95 % | RESPIRATION RATE: 18 BRPM

## 2022-05-04 LAB
GLUCOSE BLD STRIP.AUTO-MCNC: 159 MG/DL (ref 65–117)
GLUCOSE BLD STRIP.AUTO-MCNC: 271 MG/DL (ref 65–117)
PERFORMED BY, TECHID: ABNORMAL
PERFORMED BY, TECHID: ABNORMAL

## 2022-05-04 PROCEDURE — 97530 THERAPEUTIC ACTIVITIES: CPT

## 2022-05-04 PROCEDURE — 82962 GLUCOSE BLOOD TEST: CPT

## 2022-05-04 PROCEDURE — 74011250637 HC RX REV CODE- 250/637: Performed by: PHYSICIAN ASSISTANT

## 2022-05-04 PROCEDURE — 74011000250 HC RX REV CODE- 250: Performed by: ORTHOPAEDIC SURGERY

## 2022-05-04 PROCEDURE — 74011250637 HC RX REV CODE- 250/637: Performed by: INTERNAL MEDICINE

## 2022-05-04 PROCEDURE — 74011250637 HC RX REV CODE- 250/637: Performed by: ORTHOPAEDIC SURGERY

## 2022-05-04 RX ADMIN — SENNOSIDES AND DOCUSATE SODIUM 1 TABLET: 50; 8.6 TABLET ORAL at 08:47

## 2022-05-04 RX ADMIN — PANTOPRAZOLE SODIUM 20 MG: 20 TABLET, DELAYED RELEASE ORAL at 08:47

## 2022-05-04 RX ADMIN — AMLODIPINE BESYLATE 10 MG: 5 TABLET ORAL at 08:47

## 2022-05-04 RX ADMIN — ACETAMINOPHEN 650 MG: 325 TABLET ORAL at 00:00

## 2022-05-04 RX ADMIN — KETOCONAZOLE: 20 CREAM TOPICAL at 08:48

## 2022-05-04 RX ADMIN — DOCUSATE SODIUM 100 MG: 100 CAPSULE, LIQUID FILLED ORAL at 08:47

## 2022-05-04 RX ADMIN — Medication 1 TABLET: at 08:47

## 2022-05-04 RX ADMIN — ALOGLIPTIN 25 MG: 12.5 TABLET, FILM COATED ORAL at 08:47

## 2022-05-04 RX ADMIN — ATORVASTATIN CALCIUM 20 MG: 20 TABLET, FILM COATED ORAL at 08:47

## 2022-05-04 RX ADMIN — SODIUM CHLORIDE, PRESERVATIVE FREE 10 ML: 5 INJECTION INTRAVENOUS at 05:07

## 2022-05-04 RX ADMIN — ACETAMINOPHEN 650 MG: 325 TABLET ORAL at 11:33

## 2022-05-04 RX ADMIN — ASPIRIN 325 MG: 325 TABLET, COATED ORAL at 08:47

## 2022-05-04 RX ADMIN — FERROUS SULFATE TAB 325 MG (65 MG ELEMENTAL FE) 325 MG: 325 (65 FE) TAB at 08:47

## 2022-05-04 RX ADMIN — METFORMIN HYDROCHLORIDE 500 MG: 500 TABLET ORAL at 08:47

## 2022-05-04 RX ADMIN — PREGABALIN 50 MG: 50 CAPSULE ORAL at 08:47

## 2022-05-04 NOTE — PROGRESS NOTES
Called report to [facility] and spoke with Alfred Mann Rn. SBAR was given in report along with recent vitals and labs. Unit number given for any possible future questions. [facility] is transporting the pt to the facility. ETA 1430.

## 2022-05-04 NOTE — PROGRESS NOTES
Bed available today at 2pm/1400 for patient. Patient will be discharging to [facility] and rehab today. Nurse can call report at [phone]. Patient to be followed by Dr. Lauri Wei while at [facility]. Transport to be setup for 2pm/1400. Discharge plan of care/case management plan validated with provider discharge order.

## 2022-05-04 NOTE — PROGRESS NOTES
Pt has discharge orders today to rehab. Pt has been cleared for discharge by the attending and all consulting providers. Pt is going to [facility] and rehab. Pt is stable, alert, oriented and does not show any signs of distress. Pt is discharging without an IV, tele or braga. Discharge plan of care/case management plan validated with provider discharge order. CM confirmed the pt is able to discharge today after 1400.

## 2022-05-04 NOTE — PROGRESS NOTES
PHYSICAL THERAPY TREATMENT  Patient: Anupama Hsu (87 y.o. female)  Date: 5/4/2022  Diagnosis: Hip fracture ([address] Claudy Joao <principal problem not specified>  Procedure(s) (LRB):  RIGHT HIP HEMIARTHROPLASTY (Right) 5 Days Post-Op  Precautions:    Chart, physical therapy assessment, plan of care and goals were reviewed. ASSESSMENT  Patient continues with skilled PT services and is progressing towards goals. Pt semi supine in bed upon arrival and agreeable to session. Completed sup>sit with SBA, HOB elevated, and additional time. Completed STS from EOB with min A. Pt became incontinent of urine at this time so transferred to chair with RW and CGA, socks changed, floor cleaned. Pt performed STS again with RW and min A. Ambulated ~50' with RW and CGA, gait speed is slow, but had no LOB or knee buckling. Pt reports to increase in pain during gait. Pt returned to recliner and left with call bell in reach and needs met. Rec d/c to IRF once medically appropriate. Current Level of Function Impacting Discharge (mobility/balance): assistance required for safety    Other factors to consider for discharge: PLOF, time since onset, assistance at home         PLAN :  Patient continues to benefit from skilled intervention to address the above impairments. Continue treatment per established plan of care. to address goals.     Recommendation for discharge: (in order for the patient to meet his/her long term goals)  [address]     This discharge recommendation:  Has been made in collaboration with the attending provider and/or case management    IF patient discharges home will need the following DME: to be determined (TBD)       SUBJECTIVE:   Patient stated I want to walk more    OBJECTIVE DATA SUMMARY:   Critical Behavior:  Neurologic State: Alert  Orientation Level: Appropriate for age  Cognition: Follows commands    Functional Mobility Training:  Bed Mobility:  Rolling: Stand-by assistance  Supine to Sit: Contact guard assistance; Additional time;Bed Modified  Scooting: Contact guard assistance    Transfers:  Sit to Stand: Minimum assistance  Stand to Sit: Minimum assistance  Bed to Chair: Contact guard assistance    Balance:  Sitting: Intact; Without support  Standing: Impaired; With support  Standing - Static: Constant support;Good  Standing - Dynamic : Constant support; Fair    Ambulation/Gait Training:  Distance (ft): 50 Feet (ft)  Assistive Device: Gait belt;Walker, rolling  Ambulation - Level of Assistance: Contact guard assistance  Right Side Weight Bearing: As tolerated  Base of Support: Narrowed  Speed/Shavon: Slow    Pain Ratin/10 R hip    Activity Tolerance:   Fair and requires rest breaks  Please refer to the flowsheet for vital signs taken during this treatment. After treatment patient left in no apparent distress:   Sitting in chair and Call bell within reach    COMMUNICATION/COLLABORATION:   The patients plan of care was discussed with: Registered nurse. Problem: Mobility Impaired (Adult and Pediatric)  Goal: *Acute Goals and Plan of Care (Insert Text)  Description: Pt stated goal: to get better  Pt will be I with LE HEP in 7 days. Pt will perform bed mobility with mod I in 7 days. Pt will perform transfers with mod I in 7 days. Pt will amb 25-50 feet with LRAD safely with mod I in 7 days. Pt will verbalize and demonstrate compliance with posterior hip precautions to include no abduction, no hip flexion >90 deg, and no IR of right hip in 7 days.    Outcome: Progressing Towards Goal       Meaghan Dejesus PTA   Time Calculation: 38 mins

## 2022-05-04 NOTE — DISCHARGE INSTRUCTIONS
Patient Education        Surgery to Repair a Hip Fracture: What to Expect at Home  Your Recovery     Surgery for a hip fracture repairs a broken hip bone. When you leave the hospital after surgery, you will probably be walking with crutches or a walker. You may be able to climb a few stairs and get in and out of bed and chairs. But you will need someone to help you at home for the next few weeks or until you have more energy and can move around better. If there is no one to help you at home, you may go to a rehabilitation center or long-term care center. You will go home with a bandage and stitches or staples. You can remove the bandage when your doctor tells you to. Your doctor will remove your stitches or staples 10 days to 3 weeks after your surgery. You may still have some mild pain, and the area may be swollen for 3 to 4 months after surgery. Your doctor will give you medicine for the pain. You will continue the rehabilitation program (rehab) you started in the hospital. The better you do with your rehab exercises, the quicker you will get your strength and movement back. Most people are able to return to work 4 weeks to 4 months after surgery. But it may take 6 months to 1 year for you to fully recover. Some people, especially older people, are never able to move quite as well as they used to. You heal best when you take good care of yourself. Eat a variety of healthy foods, and don't smoke. This care sheet gives you a general idea about how long it will take for you to recover. But each person recovers at a different pace. Follow the steps below to get better as quickly as possible. How can you care for yourself at home? Activity    · Rest when you feel tired. You may take a nap, but don't stay in bed all day.     · Work with your physical therapist to learn the best way to exercise. You may be able to take frequent, short walks using crutches or a walker.  You will probably have to use crutches or a walker for at least 4 to 6 weeks. After that, you may need to use a cane to help you walk.     · Do not sit for longer than 30 to 45 minutes at a time. When you sit, use chairs with arms, and don't sit in low chairs.     · Sleep on your back with your legs slightly apart or on your side with a pillow between your knees for about 6 weeks or as your doctor tells you. Don't sleep on your stomach or affected hip.     · You may need to take sponge baths until your stitches or staples have been removed. You will probably be able to shower 24 hours after they are removed. Ask your doctor when it is okay to bathe or shower.     · Ask your doctor when you can drive again.     · Most people are able to return to work 4 weeks to 4 months after surgery.     · Ask your doctor when it is okay for you to have sex.     · Do not lift anything that would make you strain. This may include heavy grocery bags and milk containers, a heavy briefcase or backpack, cat litter or dog food bags, a vacuum , or a child. Diet    · By the time you leave the hospital, you will probably be eating your normal diet. If your stomach is upset, try bland, low-fat foods like plain rice, broiled chicken, toast, and yogurt. Your doctor may recommend that you take iron and vitamin supplements.     · Continue to drink plenty of fluids.     · Eat healthy foods, and watch your portion sizes. Try to stay at your ideal weight. Too much weight puts more stress on your hip joint.     · You may notice that your bowel movements are not regular right after your surgery. This is common. Try to avoid constipation and straining with bowel movements. You may want to take a fiber supplement every day. If you have not had a bowel movement after a couple of days, ask your doctor about taking a mild laxative.     · Your doctor may want you to take calcium supplements and eat foods high in calcium, such as milk, cheese, ice cream, and salmon with bones.  These help stop bone loss. Orange juice and soy milk with added calcium are also good choices. Medicines    · Your doctor will tell you if and when you can restart your medicines. You will also be given instructions about taking any new medicines.     · If you take aspirin or some other blood thinner, ask your doctor if and when to start taking it again. Make sure that you understand exactly what your doctor wants you to do.     · Your doctor may give you a blood-thinning medicine to prevent blood clots. If you take a blood thinner, be sure you get instructions about how to take your medicine safely. Blood thinners can cause serious bleeding problems. This medicine could be in pill form or as a shot (injection). If a shot is necessary, your doctor will tell you how to do this.     · Be safe with medicines. Take pain medicines exactly as directed. ? If the doctor gave you a prescription medicine for pain, take it as prescribed. ? If you are not taking a prescription pain medicine, ask your doctor if you can take an over-the-counter medicine.     · If you think your pain medicine is making you sick to your stomach:  ? Take your medicine after meals (unless your doctor has told you not to). ? Ask your doctor for a different pain medicine.     · If your doctor prescribed antibiotics, take them as directed. Do not stop taking them just because you feel better. You need to take the full course of antibiotics.     · Your doctor may also prescribe medicines or calcium supplements to make your bones stronger. Incision care    · You will have a bandage over the cut (incision) and staples or stitches. If there is no drainage, most doctors will let you take the bandage off in a few days.     · Your doctor will remove the staples or stitches 10 days to 3 weeks after the surgery and replace them with strips of tape. Leave the tape on for a week or until it falls off.    Exercise    · Your rehab program will include a number of exercises to do. Always do them as your therapist tells you.     · Do not do any vigorous exercise for 12 weeks or until your doctor tells you it is okay. Ice and elevation    · For pain, put ice or a cold pack on the area for 10 to 20 minutes at a time. Put a thin cloth between the ice and your skin.     · Your ankle may swell for about 3 months. Prop up your ankle when you ice it or anytime you sit or lie down. Try to keep it above the level of your heart. This will help reduce swelling. Other instructions    · Continue to wear your support stockings as your doctor says. These help to prevent blood clots. The length of time that you will have to wear them depends on your activity level and the amount of swelling you have. Most people wear these stockings for 4 to 6 weeks after surgery.     · Follow these tips to prevent falls:  ? Arrange furniture so that you won't trip on it. ? Get rid of throw rugs, and move electrical cords out of the way. ? Walk only in areas with plenty of light. ? Put grab bars in showers and bathtubs. ? Avoid icy or snowy sidewalks. ? Wear shoes with sturdy, flat soles. Follow-up care is a key part of your treatment and safety. Be sure to make and go to all appointments, and call your doctor if you are having problems. It's also a good idea to know your test results and keep a list of the medicines you take. When should you call for help? Call 911 anytime you think you may need emergency care. For example, call if:    · You passed out (lost consciousness).     · You have severe trouble breathing.     · You have sudden chest pain and shortness of breath, or you cough up blood. Call your doctor now or seek immediate medical care if:    · Your leg or foot is cool or pale or changes color.     · You cannot feel or move your leg.     · You have signs of a blood clot, such as:  ? Pain in your calf, back of the knee, thigh, or groin. ? Redness and swelling in your leg or groin.   · Your incision comes open and begins to bleed, or the bleeding increases.     · You feel like your heart is racing or beating irregularly.     · You have signs of infection, such as:  ? Increased pain, swelling, warmth, or redness. ? Red streaks leading from the incision. ? Pus draining from the incision. ? A fever. Watch closely for any changes in your health, and be sure to contact your doctor if:    · You do not have a bowel movement after taking a laxative.     · You do not get better as expected. Where can you learn more? Go to http://www.gray.com/  Enter U155 in the search box to learn more about \"Surgery to Repair a Hip Fracture: What to Expect at Home. \"  Current as of: July 1, 2021               Content Version: 13.2  © 2006-2022 [employer]. Care instructions adapted under license by [employer] (which disclaims liability or warranty for this information). If you have questions about a medical condition or this instruction, always ask your healthcare professional. [place] [address] any warranty or liability for your use of this information. Patient Education        Surgery to Repair a Hip Fracture: What to Expect at Home  Your Recovery     Surgery for a hip fracture repairs a broken hip bone. When you leave the hospital after surgery, you will probably be walking with crutches or a walker. You may be able to climb a few stairs and get in and out of bed and chairs. But you will need someone to help you at home for the next few weeks or until you have more energy and can move around better. If there is no one to help you at home, you may go to a rehabilitation center or long-term care center. You will go home with a bandage and stitches or staples. You can remove the bandage when your doctor tells you to. Your doctor will remove your stitches or staples 10 days to 3 weeks after your surgery.  You may still have some mild pain, and the area may be swollen for 3 to 4 months after surgery. Your doctor will give you medicine for the pain. You will continue the rehabilitation program (rehab) you started in the hospital. The better you do with your rehab exercises, the quicker you will get your strength and movement back. Most people are able to return to work 4 weeks to 4 months after surgery. But it may take 6 months to 1 year for you to fully recover. Some people, especially older people, are never able to move quite as well as they used to. You heal best when you take good care of yourself. Eat a variety of healthy foods, and don't smoke. This care sheet gives you a general idea about how long it will take for you to recover. But each person recovers at a different pace. Follow the steps below to get better as quickly as possible. How can you care for yourself at home? Activity    · Rest when you feel tired. You may take a nap, but don't stay in bed all day.     · Work with your physical therapist to learn the best way to exercise. You may be able to take frequent, short walks using crutches or a walker. You will probably have to use crutches or a walker for at least 4 to 6 weeks. After that, you may need to use a cane to help you walk.     · Do not sit for longer than 30 to 45 minutes at a time. When you sit, use chairs with arms, and don't sit in low chairs.     · Sleep on your back with your legs slightly apart or on your side with a pillow between your knees for about 6 weeks or as your doctor tells you. Don't sleep on your stomach or affected hip.     · You may need to take sponge baths until your stitches or staples have been removed. You will probably be able to shower 24 hours after they are removed.  Ask your doctor when it is okay to bathe or shower.     · Ask your doctor when you can drive again.     · Most people are able to return to work 4 weeks to 4 months after surgery.     · Ask your doctor when it is okay for you to have sex.     · Do not lift anything that would make you strain. This may include heavy grocery bags and milk containers, a heavy briefcase or backpack, cat litter or dog food bags, a vacuum , or a child. Diet    · By the time you leave the hospital, you will probably be eating your normal diet. If your stomach is upset, try bland, low-fat foods like plain rice, broiled chicken, toast, and yogurt. Your doctor may recommend that you take iron and vitamin supplements.     · Continue to drink plenty of fluids.     · Eat healthy foods, and watch your portion sizes. Try to stay at your ideal weight. Too much weight puts more stress on your hip joint.     · You may notice that your bowel movements are not regular right after your surgery. This is common. Try to avoid constipation and straining with bowel movements. You may want to take a fiber supplement every day. If you have not had a bowel movement after a couple of days, ask your doctor about taking a mild laxative.     · Your doctor may want you to take calcium supplements and eat foods high in calcium, such as milk, cheese, ice cream, and salmon with bones. These help stop bone loss. Orange juice and soy milk with added calcium are also good choices. Medicines    · Your doctor will tell you if and when you can restart your medicines. You will also be given instructions about taking any new medicines.     · If you take aspirin or some other blood thinner, ask your doctor if and when to start taking it again. Make sure that you understand exactly what your doctor wants you to do.     · Your doctor may give you a blood-thinning medicine to prevent blood clots. If you take a blood thinner, be sure you get instructions about how to take your medicine safely. Blood thinners can cause serious bleeding problems. This medicine could be in pill form or as a shot (injection).  If a shot is necessary, your doctor will tell you how to do this.     · Be safe with medicines. Take pain medicines exactly as directed. ? If the doctor gave you a prescription medicine for pain, take it as prescribed. ? If you are not taking a prescription pain medicine, ask your doctor if you can take an over-the-counter medicine.     · If you think your pain medicine is making you sick to your stomach:  ? Take your medicine after meals (unless your doctor has told you not to). ? Ask your doctor for a different pain medicine.     · If your doctor prescribed antibiotics, take them as directed. Do not stop taking them just because you feel better. You need to take the full course of antibiotics.     · Your doctor may also prescribe medicines or calcium supplements to make your bones stronger. Incision care    · You will have a bandage over the cut (incision) and staples or stitches. If there is no drainage, most doctors will let you take the bandage off in a few days.     · Your doctor will remove the staples or stitches 10 days to 3 weeks after the surgery and replace them with strips of tape. Leave the tape on for a week or until it falls off. Exercise    · Your rehab program will include a number of exercises to do. Always do them as your therapist tells you.     · Do not do any vigorous exercise for 12 weeks or until your doctor tells you it is okay. Ice and elevation    · For pain, put ice or a cold pack on the area for 10 to 20 minutes at a time. Put a thin cloth between the ice and your skin.     · Your ankle may swell for about 3 months. Prop up your ankle when you ice it or anytime you sit or lie down. Try to keep it above the level of your heart. This will help reduce swelling. Other instructions    · Continue to wear your support stockings as your doctor says. These help to prevent blood clots. The length of time that you will have to wear them depends on your activity level and the amount of swelling you have. Most people wear these stockings for 4 to 6 weeks after surgery.   · Follow these tips to prevent falls:  ? Arrange furniture so that you won't trip on it. ? Get rid of throw rugs, and move electrical cords out of the way. ? Walk only in areas with plenty of light. ? Put grab bars in showers and bathtubs. ? Avoid icy or snowy sidewalks. ? Wear shoes with sturdy, flat soles. Follow-up care is a key part of your treatment and safety. Be sure to make and go to all appointments, and call your doctor if you are having problems. It's also a good idea to know your test results and keep a list of the medicines you take. When should you call for help? Call 911 anytime you think you may need emergency care. For example, call if:    · You passed out (lost consciousness).     · You have severe trouble breathing.     · You have sudden chest pain and shortness of breath, or you cough up blood. Call your doctor now or seek immediate medical care if:    · Your leg or foot is cool or pale or changes color.     · You cannot feel or move your leg.     · You have signs of a blood clot, such as:  ? Pain in your calf, back of the knee, thigh, or groin. ? Redness and swelling in your leg or groin.     · Your incision comes open and begins to bleed, or the bleeding increases.     · You feel like your heart is racing or beating irregularly.     · You have signs of infection, such as:  ? Increased pain, swelling, warmth, or redness. ? Red streaks leading from the incision. ? Pus draining from the incision. ? A fever. Watch closely for any changes in your health, and be sure to contact your doctor if:    · You do not have a bowel movement after taking a laxative.     · You do not get better as expected. Where can you learn more? Go to http://www.gray.com/  Enter Y426 in the search box to learn more about \"Surgery to Repair a Hip Fracture: What to Expect at Home. \"  Current as of: July 1, 2021               Content Version: 13.2  © 7362-8472 [employer]. Care instructions adapted under license by [employer] (which disclaims liability or warranty for this information). If you have questions about a medical condition or this instruction, always ask your healthcare professional. [address] any warranty or liability for your use of this information. Patient Education        Learning About Surgery to Repair a Hip Fracture  What is surgery for a hip fracture? Surgery for a hip fracture repairs a broken hip bone. Broken hips are often caused by a fall or other injury. Some kinds of broken bones heal on their own in a cast. But a broken hip is not likely to heal well without surgery. This type of surgery is usually done right after a hip breaks. How is surgery for a hip fracture done? During surgery to fix a fractured hip, you will be asleep and will not feel pain. Your doctor will:  · Make one or two cuts (incisions) over the broken bone in your hip. · Move the pieces of bone back into the right position. · Attach the pieces of the bone together with metal pins, screws, rods, or plates. · Use X-rays to see if the pins and plates are in the correct place. · Stitch or staple the incisions closed. What can you expect after surgery for a hip fracture ? You will probably stay in the hospital for 2 to 4 days after surgery. Your rehabilitation program (rehab) will start at this time. If you don't have someone to help you at home, you may go from the hospital to a short-term rehabilitation center or a long-term care center. For several months, you may need the help of a walker or crutches. After that, you may need to walk with a cane. At first, you may need help with daily activities such as bathing, dressing, and cooking. Rehab will help you get back to your regular activities. But it will probably take at least 3 months to return to your normal routine.  It may take 6 months to 1 year for you to fully recover. Some people, especially older people, are never able to move as well as they used to. · You will slowly return to most of your activities. ? You may be able to walk on your own in 4 to 6 weeks. Until then, you will need crutches or a walker. After that, you may need to walk with a cane. ? Ask your doctor when you can drive again. ? You may be able to return to work in 4 weeks to 4 months, depending on your job. ? Your doctor will tell you when you can walk, swim, dance, golf, or bicycle. Ask your doctor about other activities you would like to do.  ? Your doctor may advise you to avoid more strenuous activities, such as running or tennis, or those where a fall is possible, such as horseback riding or skiing. Follow-up care is a key part of your treatment and safety. Be sure to make and go to all appointments, and call your doctor if you are having problems. It's also a good idea to know your test results and keep a list of the medicines you take. Where can you learn more? Go to http://www.gray.com/  Enter X771 in the search box to learn more about \"Learning About Surgery to Repair a Hip Fracture. \"  Current as of: July 1, 2021               Content Version: 13.2  © 2006-2022 [employer]. Care instructions adapted under license by [employer] (which disclaims liability or warranty for this information). If you have questions about a medical condition or this instruction, always ask your healthcare professional. [place] [address] any warranty or liability for your use of this information.

## 2022-05-04 NOTE — PROGRESS NOTES
PROGRESS NOTE      Subjective: Irritated that she is not going to rehab today. She denies any chest pain or shortness of breath. She is trying to eat. No pain. No fever or chills. Visit Vitals  BP (!) 138/57 (BP 1 Location: Left upper arm, BP Patient Position: Supine)   Pulse 76   Temp 98.2 °F (36.8 °C)   Resp 16   Ht 5' 4\" (1.626 m)   Wt 57.3 kg (126 lb 4.8 oz)   SpO2 90%   Breastfeeding No   BMI 21.68 kg/m²       Current Facility-Administered Medications:     alogliptin (NESINA) tablet 25 mg, 25 mg, Oral, DAILY, Mohsen Roman MD, 25 mg at 05/04/22 08    aspirin delayed-release tablet 325 mg, 325 mg, Oral, BID, Letha Arroyo PA-C, 325 mg at 05/04/22 08    ferrous sulfate tablet 325 mg, 1 Tablet, Oral, DAILY WITH BREAKFAST, Mohsen Jones MD, 325 mg at 05/04/22 08    amLODIPine (NORVASC) tablet 10 mg, 10 mg, Oral, DAILY, Tyrone Grady MD, 10 mg at 05/04/22 08    atorvastatin (LIPITOR) tablet 20 mg, 20 mg, Oral, DAILY, Mustapha Grady MD, 20 mg at 05/04/22 08    calcium-vitamin D 600 mg-5 mcg (200 unit) per tablet 1 Tablet, 1 Tablet, Oral, DAILY, Mustapah Grady MD, 1 Tablet at 05/04/22 08    docusate sodium (COLACE) capsule 100 mg, 100 mg, Oral, DAILY, Mustapha Grady MD, 100 mg at 05/04/22 08    pantoprazole (PROTONIX) tablet 20 mg, 20 mg, Oral, ACB, Mustapha Grady MD, 20 mg at 05/04/22 08    metFORMIN (GLUCOPHAGE) tablet 500 mg, 500 mg, Oral, BID WITH MEALS, Mustapha Grady MD, 500 mg at 05/04/22 08    pregabalin (LYRICA) capsule 50 mg, 50 mg, Oral, BID, Mustapha Grady MD, 50 mg at 05/04/22 0847    acetaminophen (TYLENOL) tablet 500 mg, 500 mg, Oral, Q4H PRN, John Salazar MD    . PHARMACY TO SUBSTITUTE PER PROTOCOL (Reordered from: estradioL (ESTRACE) 0.01 % (0.1 mg/gram) vaginal cream), , , Per Protocol, Tyrone Grady MD    ketoconazole (NIZORAL) 2 % cream, , Topical, DAILY, Tyrone Grady MD, Given at 05/04/22 0848    sodium chloride (NS) flush 5-40 mL, 5-40 mL, IntraVENous, PRN, Mustapha Grady MD    naloxone (NARCAN) injection 0.4 mg, 0.4 mg, IntraVENous, PRN, Jeni ROGERS MD    senna-docusate (PERICOLACE) 8.6-50 mg per tablet 1 Tablet, 1 Tablet, Oral, BID, Jeni ROGERS MD, 1 Tablet at 05/04/22 0847    polyethylene glycol (MIRALAX) packet 17 g, 17 g, Oral, DAILY, Mustapha Grady MD, 17 g at 05/03/22 0859    bisacodyL (DULCOLAX) suppository 10 mg, 10 mg, Rectal, DAILY PRN, Jeni ROGERS MD, 10 mg at 05/01/22 1543    acetaminophen (TYLENOL) tablet 650 mg, 650 mg, Oral, Q6H, Mustapha Grady MD, 650 mg at 05/04/22 0000    traMADoL (ULTRAM) tablet 50 mg, 50 mg, Oral, Q6H PRN, Jeni ROGERS MD, 50 mg at 05/03/22 2243  Recent Results (from the past 24 hour(s))   GLUCOSE, POC    Collection Time: 05/03/22 11:44 AM   Result Value Ref Range    Glucose (POC) 212 (H) 65 - 117 mg/dL    Performed by Alexsandra Heredia, POC    Collection Time: 05/03/22  3:05 PM   Result Value Ref Range    Glucose (POC) 151 (H) 65 - 117 mg/dL    Performed by Alexsandra Heredia, POC    Collection Time: 05/03/22  7:40 PM   Result Value Ref Range    Glucose (POC) 186 (H) 65 - 117 mg/dL    Performed by Luna Lopez ([place])    GLUCOSE, POC    Collection Time: 05/04/22  7:47 AM   Result Value Ref Range    Glucose (POC) 159 (H) 65 - 117 mg/dL    Performed by CINDY RAMACHANDRAN PELV AP ONLY   Final Result   Apparent satisfactory postoperative appearance. XR FLUOROSCOPY UNDER 60 MINUTES   Final Result   Intraoperative study as above. XR FEMUR RT 2 VS   Final Result   Findings/impression:   The patient is status post prior placement of a 3 component right total knee   prosthesis. There is increased radiolucency at the bone metallic interface of   the tibial component which may indicate a mild degree of chronic loosening.    There also is a mild to moderate varus angle of the tibial tray relative to a   line drawn perpendicular to the tibial diaphysis. There is an acute transcervical femoral neck fracture with moderate displacement   and angulation at the fracture site. This was described on a recent CT   evaluation of the pelvis. This examination is negative for additional fractures throughout the remainder   of the right femur. XR CHEST PORT   Final Result   1. IMPRESSION: No acute abnormality identified. CT PELV WO CONT   Final Result   1. There is an acute displaced mid (transcervical) fracture of the right   femoral neck. 2.  There are older fractures involving the right superior and ischiopubic rami. 3.  The patient status post prior placement of a left bipolar hip prosthesis. Please see the above text for further details. HEENT: Normocephalic atraumatic  Neck: Prominent neck  Lungs: Clear anteriorly no coarse crackles or wheeze  Heart: Regular  Abdomen: Soft positive bowel sounds  Lower Extremities: No cyanosis or edema. Right hip mild tenderness to touch  CNS: Alert and oriented follows command  Psych: Cooperative     Assessment:     Anemia with a decreased H&H acute blood loss patient   Fracture right hip status post right hemiarthroplasty   Hypertension   Diabetes with hyperglycemia   Hypertension presently stable   History of osteoporosis on Prolia shot        Plan: Awaiting discharge to nursing home/rehab. Continue current care. Physical therapy occupational therapy. Assist with meals. Fall precautions.

## 2022-05-04 NOTE — PROGRESS NOTES
Progress Note      5/4/2022 4:25 PM  NAME: Javier Miguel   MRN:  [id number]   Admit Diagnosis: Hip fracture ([facility] [address] [S72.009A]      Subjective:   Chart reviewed. Patient had a surgery performed. She feels good. Her hip pain is still there but significantly improved. Review of Systems:    Symptom Y/N Comments  Symptom Y/N Comments   Fever/Chills n   Chest Pain n    Poor Appetite    Edema     Cough    Abdominal Pain     Sputum    Joint Pain y  hip pain is better   SOB/CARBONE n   Pruritis/Rash     Nausea/vomit    Other     Diarrhea         Constipation           Could NOT obtain due to:      Objective:       Physical Exam:    Last 24hrs VS reviewed since prior progress note. Most recent are:    Visit Vitals  BP (!) 138/57 (BP 1 Location: Left upper arm, BP Patient Position: Supine)   Pulse 76   Temp 98.2 °F (36.8 °C)   Resp 16   Ht 5' 4\" (1.626 m)   Wt 57.3 kg (126 lb 4.8 oz)   SpO2 90%   Breastfeeding No   BMI 21.68 kg/m²       Intake/Output Summary (Last 24 hours) at 5/4/2022 1208  Last data filed at 5/3/2022 2128  Gross per 24 hour   Intake 200 ml   Output 500 ml   Net -300 ml        General Appearance: Well developed, well nourished, alert & oriented x 3,    no acute distress. Ears/Nose/Mouth/Throat: Hearing grossly normal.  Stigmata of old stroke with the left-sided facial asymmetry  Neck: Supple. Chest: Lungs clear to auscultation bilaterally. Cardiovascular: Regular rate and rhythm, S1,S2 normal, no murmur. Abdomen: Soft, non-tender, bowel sounds are active. Extremities: No edema bilaterally. Skin: Warm and dry. []         Post-cath site without hematoma, bruit, tenderness, or thrill. Distal pulses intact. PMH/SH reviewed - no change compared to H&P    Data Review    Telemetry: normal sinus rhythm     EKG:   []  No new EKG for review    Lab Data Personally Reviewed:    No results for input(s): WBC, HGB, HCT, PLT, HGBEXT, HCTEXT, PLTEXT, HGBEXT, HCTEXT, PLTEXT in the last 72 hours.   No results for input(s): INR, PTP, APTT, INREXT, INREXT in the last 72 hours. No results for input(s): NA, K, CL, CO2, BUN, CREA, GLU, CA, MG in the last 72 hours. No results for input(s): CPK, CKNDX, TROIQ in the last 72 hours. No lab exists for component: CPKMB  No results found for: CHOL, CHOLX, CHLST, CHOLV, HDL, HDLP, LDL, LDLC, DLDLP, TGLX, TRIGL, TRIGP, CHHD, CHHDX    No results for input(s): AP, TBIL, TP, ALB, GLOB, GGT, AML, LPSE in the last 72 hours. No lab exists for component: SGOT, GPT, AMYP, HLPSE  No results for input(s): PH, PCO2, PO2 in the last 72 hours. Medications Personally Reviewed:    Current Facility-Administered Medications   Medication Dose Route Frequency    alogliptin (NESINA) tablet 25 mg  25 mg Oral DAILY    aspirin delayed-release tablet 325 mg  325 mg Oral BID    ferrous sulfate tablet 325 mg  1 Tablet Oral DAILY WITH BREAKFAST    amLODIPine (NORVASC) tablet 10 mg  10 mg Oral DAILY    atorvastatin (LIPITOR) tablet 20 mg  20 mg Oral DAILY    calcium-vitamin D 600 mg-5 mcg (200 unit) per tablet 1 Tablet  1 Tablet Oral DAILY    docusate sodium (COLACE) capsule 100 mg  100 mg Oral DAILY    pantoprazole (PROTONIX) tablet 20 mg  20 mg Oral ACB    metFORMIN (GLUCOPHAGE) tablet 500 mg  500 mg Oral BID WITH MEALS    pregabalin (LYRICA) capsule 50 mg  50 mg Oral BID    acetaminophen (TYLENOL) tablet 500 mg  500 mg Oral Q4H PRN    . PHARMACY TO SUBSTITUTE PER PROTOCOL (Reordered from: estradioL (ESTRACE) 0.01 % (0.1 mg/gram) vaginal cream)    Per Protocol    ketoconazole (NIZORAL) 2 % cream   Topical DAILY    sodium chloride (NS) flush 5-40 mL  5-40 mL IntraVENous PRN    naloxone (NARCAN) injection 0.4 mg  0.4 mg IntraVENous PRN    senna-docusate (PERICOLACE) 8.6-50 mg per tablet 1 Tablet  1 Tablet Oral BID    polyethylene glycol (MIRALAX) packet 17 g  17 g Oral DAILY    bisacodyL (DULCOLAX) suppository 10 mg  10 mg Rectal DAILY PRN    acetaminophen (TYLENOL) tablet 650 mg  650 mg Oral Q6H    traMADoL (ULTRAM) tablet 50 mg  50 mg Oral Q6H PRN           Problem List:   Hypertension. Diabetes mellitus type 2. History of stroke. Fracture of the right hip and patient had open reduction and internal fixation and is clinically doing well. Echocardiogram revealed ejection fraction is normal.    1.      Assessment/Plan:   Patient will be transferred for rehabilitation today. Probably will continue present care and a physical therapy and rehabilitation as per orthopedics. Thank you. 1.          []       High complexity decision making was performed in this patient at high risk for decompensation with multiple organ involvement.     Areli Argueta MD

## 2022-05-04 NOTE — PROGRESS NOTES
OCCUPATIONAL THERAPY TREATMENT  Patient: Mahesh Carbajal (71 y.o. female)  Date: 5/4/2022  Diagnosis: Hip fracture ([address] Eliazar March <principal problem not specified>  Procedure(s) (LRB):  RIGHT HIP HEMIARTHROPLASTY (Right) 5 Days Post-Op  Precautions:    Chart, occupational therapy assessment, plan of care, and goals were reviewed. ASSESSMENT  Patient continues with skilled OT services and is progressing towards goals. Upon OTOOLE arrival, pt sitting in recliner and agreeable to tx session. Pt completed sit>stand with CGA/Celso using RW for balance upon standing. Pt ambulated to bathroom with CGA and completed transfer onto raised toilet seat with CGA. Pt attempted seated bladder hygiene but unable to reach, so pt completed sit>stand with CGA and completed standing bladder hygiene with CGA. Pt completed standing oral hygiene, face washing, and hair brushing at sink with CGA. Pt returned to recliner in room with CGA/SBA. Donning of clean gown completed with setup A. Pt left sitting in recliner with call bell within reach and needs met. Will continue to follow pt throughout remainder of stay and progress towards OT goals. Recommending IRF at discharge when medically appropriate. Other factors to consider for discharge: family/social support, DME, time since onset, severity of deficits, decline from functional baseline         PLAN :  Patient continues to benefit from skilled intervention to address the above impairments. Continue treatment per established plan of care. to address goals. Recommendation for discharge: (in order for the patient to meet his/her long term goals)  1 [facility]     This discharge recommendation:  Has been made in collaboration with the attending provider and/or case management    IF patient discharges home will need the following DME: TBD       SUBJECTIVE:   Patient stated I think I need to go to the bathroom.     OBJECTIVE DATA SUMMARY: Cognitive/Behavioral Status:  Neurologic State: Alert  Orientation Level: Oriented X4  Cognition: Follows commands    Functional Mobility and Transfers for ADLs:  Bed Mobility:  Rolling: Stand-by assistance  Supine to Sit: Contact guard assistance; Additional time;Bed Modified  Scooting: Contact guard assistance    Transfers:  Sit to Stand: Contact guard assistance;Minimum assistance  Functional Transfers  Toilet Transfer : Contact guard assistance (raised toilet seat)  Bed to Chair: Contact guard assistance    Balance:  Sitting: Intact; Without support  Standing: Impaired; With support  Standing - Static: Constant support;Good  Standing - Dynamic : Constant support; Fair    ADL Intervention:  Grooming  Position Performed: Standing  Washing Face: Contact guard assistance  Brushing Teeth: Contact guard assistance  Brushing/Combing Hair: Contact guard assistance    Upper Body [address]: Set-up    Toileting  Bladder Hygiene: Contact guard assistance    Pain:  0/10    Activity Tolerance:   Fair  Please refer to the flowsheet for vital signs taken during this treatment. After treatment patient left in no apparent distress:   Sitting in chair and Call bell within reach    COMMUNICATION/COLLABORATION:   The patients plan of care was discussed with: Physical therapy assistant.      XIANG Price  Time Calculation: 27 mins    Problem: Self Care Deficits Care Plan (Adult)  Goal: *Acute Goals and Plan of Care (Insert Text)  Description: Pt stated goal 'to get better'  Pt will be Mod I sup <> sit in prep for EOB ADLs  Pt will be Mod I grooming standing sink side LRAD  Pt will be Mod I UB dressing sitting EOB/long sit   Pt will be Mod I LE dressing sitting EOB/long sit  Pt will be Mod I sit <>  prep for toileting LRAD  Pt will be Mod I toileting/toilet transfer/cloth mgmt LRAD  Pt will be IND following UE HEP in prep for self care tasks      Outcome: Progressing Towards Goal

## 2022-05-04 NOTE — PROGRESS NOTES
Orthopedic progress note    Date:2022       Room:[facility]  Patient Name:Collette Cleary     YOB: 1923     Age:89 y.o. Subjective    Status post right hip hemiarthroplasty. Postop day #5  . She has no complaints of pain at this time. She is progressing well with therapy. No other complaints at this time. Objective           Vitals Last 24 Hours:  TEMPERATURE:  Temp  Av.8 °F (36.6 °C)  Min: 97.6 °F (36.4 °C)  Max: 98 °F (36.7 °C)  RESPIRATIONS RANGE: Resp  Av  Min: 16  Max: 16  PULSE OXIMETRY RANGE: SpO2  Av %  Min: 92 %  Max: 96 %  PULSE RANGE: Pulse  Av  Min: 79  Max: 86  BLOOD PRESSURE RANGE: Systolic (59UOM), END:321 , Min:135 , EXR:790   ; Diastolic (72STY), UKZ:02, Min:53, Max:66    Current Facility-Administered Medications   Medication Dose Route Frequency    alogliptin (NESINA) tablet 25 mg  25 mg Oral DAILY    aspirin delayed-release tablet 325 mg  325 mg Oral BID    ferrous sulfate tablet 325 mg  1 Tablet Oral DAILY WITH BREAKFAST    amLODIPine (NORVASC) tablet 10 mg  10 mg Oral DAILY    atorvastatin (LIPITOR) tablet 20 mg  20 mg Oral DAILY    calcium-vitamin D 600 mg-5 mcg (200 unit) per tablet 1 Tablet  1 Tablet Oral DAILY    docusate sodium (COLACE) capsule 100 mg  100 mg Oral DAILY    pantoprazole (PROTONIX) tablet 20 mg  20 mg Oral ACB    metFORMIN (GLUCOPHAGE) tablet 500 mg  500 mg Oral BID WITH MEALS    pregabalin (LYRICA) capsule 50 mg  50 mg Oral BID    acetaminophen (TYLENOL) tablet 500 mg  500 mg Oral Q4H PRN    . PHARMACY TO SUBSTITUTE PER PROTOCOL (Reordered from: estradioL (ESTRACE) 0.01 % (0.1 mg/gram) vaginal cream)    Per Protocol    ketoconazole (NIZORAL) 2 % cream   Topical DAILY    sodium chloride (NS) flush 5-40 mL  5-40 mL IntraVENous PRN    naloxone (NARCAN) injection 0.4 mg  0.4 mg IntraVENous PRN    senna-docusate (PERICOLACE) 8.6-50 mg per tablet 1 Tablet  1 Tablet Oral BID    polyethylene glycol (MIRALAX) packet 17 g  17 g Oral DAILY    bisacodyL (DULCOLAX) suppository 10 mg  10 mg Rectal DAILY PRN    acetaminophen (TYLENOL) tablet 650 mg  650 mg Oral Q6H    traMADoL (ULTRAM) tablet 50 mg  50 mg Oral Q6H PRN          I/O (24Hr): Intake/Output Summary (Last 24 hours) at 5/4/2022 0869  Last data filed at 5/3/2022 2128  Gross per 24 hour   Intake 1040 ml   Output 500 ml   Net 540 ml     Objective  Labs/Imaging/Diagnostics    Labs:  CBC:  No results for input(s): WBC, RBC, HGB, HCT, MCV, RDW, PLT, HGBEXT, HCTEXT, PLTEXT, HGBEXT, HCTEXT, PLTEXT in the last 72 hours. CHEMISTRIES:  No results for input(s): NA, K, CL, CO2, BUN, CREA, CA, PHOS, MG in the last 72 hours. No lab exists for component: GLUCOSEPT/INR:No results for input(s): INR, INREXT, INREXT in the last 72 hours. No lab exists for component: PROTIME  APTT:No results for input(s): APTT in the last 72 hours. LIVER PROFILE:  No results for input(s): AST, ALT in the last 72 hours. No lab exists for component: CHANTELLE Chowdhury  Lab Results   Component Value Date/Time    ALT (SGPT) 12 05/01/2022 05:57 AM    AST (SGOT) 20 05/01/2022 05:57 AM    Alk. phosphatase 47 05/01/2022 05:57 AM    Bilirubin, total 0.4 05/01/2022 05:57 AM       Physical Exam:  Right hip: Billy dressing remains in place. No swelling seen the right thigh. No calf pain to palpation. 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 Postop day #5. Continue with therapy as tolerated.   Continue with aspirin for DVT prophylaxis. Can discharge in orthopedics when cleared by medicine placement has been made. Patient follow-up with Dr. Caroline Keene as outpatient approximately 10 days.     Electronically signed by Myriam Gordon MD on 5/4/2022 at 10:06 AM

## 2022-05-04 NOTE — PROGRESS NOTES
Problem: Falls - Risk of  Goal: *Absence of Falls  Description: Document Sayra Brown Fall Risk and appropriate interventions in the flowsheet. Outcome: Progressing Towards Goal  Note: Fall Risk Interventions:  Mobility Interventions: Patient to call before getting OOB,Utilize walker, cane, or other assistive device    Mentation Interventions: Adequate sleep, hydration, pain control,Bed/chair exit alarm    Medication Interventions: Patient to call before getting OOB,Teach patient to arise slowly    Elimination Interventions: Bed/chair exit alarm,Call light in reach    History of Falls Interventions: Room close to nurse's station,Bed/chair exit alarm,Door open when patient unattended         Problem: Patient Education: Go to Patient Education Activity  Goal: Patient/Family Education  Outcome: Progressing Towards Goal     Problem: Pressure Injury - Risk of  Goal: *Prevention of pressure injury  Description: Document Fabian Scale and appropriate interventions in the flowsheet.   Outcome: Progressing Towards Goal  Note: Pressure Injury Interventions:  Sensory Interventions: Assess changes in LOC,Minimize linen layers    Moisture Interventions: Limit adult briefs,Internal/External urinary devices,Absorbent underpads    Activity Interventions: Increase time out of bed,PT/OT evaluation    Mobility Interventions: HOB 30 degrees or less    Nutrition Interventions: Document food/fluid/supplement intake    Friction and Shear Interventions: Minimize layers                Problem: Patient Education: Go to Patient Education Activity  Goal: Patient/Family Education  Outcome: Progressing Towards Goal     Problem: Pain  Goal: *Control of Pain  Outcome: Progressing Towards Goal  Goal: *PALLIATIVE CARE:  Alleviation of Pain  Outcome: Progressing Towards Goal     Problem: Patient Education: Go to Patient Education Activity  Goal: Patient/Family Education  Outcome: Progressing Towards Goal     Problem: Patient Education: Go to Patient Education Activity  Goal: Patient/Family Education  Outcome: Progressing Towards Goal

## 2022-06-15 ENCOUNTER — OFFICE VISIT (OUTPATIENT)
Dept: ENDOCRINOLOGY | Age: 87
End: 2022-06-15
Payer: MEDICARE

## 2022-06-15 VITALS
TEMPERATURE: 97.9 F | RESPIRATION RATE: 18 BRPM | BODY MASS INDEX: 19.97 KG/M2 | DIASTOLIC BLOOD PRESSURE: 43 MMHG | WEIGHT: 117 LBS | HEIGHT: 64 IN | HEART RATE: 73 BPM | OXYGEN SATURATION: 95 % | SYSTOLIC BLOOD PRESSURE: 128 MMHG

## 2022-06-15 DIAGNOSIS — E55.9 VITAMIN D DEFICIENCY: ICD-10-CM

## 2022-06-15 DIAGNOSIS — M81.0 AGE-RELATED OSTEOPOROSIS WITHOUT CURRENT PATHOLOGICAL FRACTURE: Primary | ICD-10-CM

## 2022-06-15 PROCEDURE — G8420 CALC BMI NORM PARAMETERS: HCPCS | Performed by: INTERNAL MEDICINE

## 2022-06-15 PROCEDURE — 1101F PT FALLS ASSESS-DOCD LE1/YR: CPT | Performed by: INTERNAL MEDICINE

## 2022-06-15 PROCEDURE — G8432 DEP SCR NOT DOC, RNG: HCPCS | Performed by: INTERNAL MEDICINE

## 2022-06-15 PROCEDURE — 99215 OFFICE O/P EST HI 40 MIN: CPT | Performed by: INTERNAL MEDICINE

## 2022-06-15 PROCEDURE — G8427 DOCREV CUR MEDS BY ELIG CLIN: HCPCS | Performed by: INTERNAL MEDICINE

## 2022-06-15 PROCEDURE — G8536 NO DOC ELDER MAL SCRN: HCPCS | Performed by: INTERNAL MEDICINE

## 2022-06-15 PROCEDURE — 1123F ACP DISCUSS/DSCN MKR DOCD: CPT | Performed by: INTERNAL MEDICINE

## 2022-06-15 PROCEDURE — 1090F PRES/ABSN URINE INCON ASSESS: CPT | Performed by: INTERNAL MEDICINE

## 2022-06-15 RX ORDER — LANOLIN ALCOHOL/MO/W.PET/CERES
325 CREAM (GRAM) TOPICAL
COMMUNITY

## 2022-06-15 RX ORDER — LOSARTAN POTASSIUM 50 MG/1
50 TABLET ORAL DAILY
COMMUNITY

## 2022-06-15 NOTE — PROGRESS NOTES
Tori Heredia MD        Patient Information  Date:6/15/2022  Name : Luis Cleary 80 y.o.     YOB: 1923         Referred by: Rivera Rangel MD     [address] [phone]      Osteoporosis follow-up    History of Present Illness: Jalil Oswald is a 80 y.o. female here for follow-up of osteoporosis. She was diagnosed with osteoporosis 10 years ago, was on Prolia since 2014, initially got it from [address] [facility]. She has underlying GERD, no peptic ulcer disease. Left hip fracture after a fall in 2017  Atul Band in the bathroom, right hip fracture April 2022, had hemiarthroplasty  Reviewed the x-ray, not intertrochanteric fracture  She is on calcium and vitamin D supplements    Hypocalcemia during hospitalization, however albumin was low      Dietary calcium Intake: Moderate amount of dairy in her diet. On calcium and vitamin D supplements. Past Medical History:   Diagnosis Date    DM (diabetes mellitus) ([address]     High cholesterol     History of multiple allergies     HTN (hypertension)     Hypertension     Osteoporosis     Type 2 diabetes mellitus (HCC)      Past Surgical History:   Procedure Laterality Date    HX APPENDECTOMY      HX HIP ARTHROSCOPY      HX HIP REPLACEMENT      HX HYSTERECTOMY      HX KNEE ARTHROSCOPY       Current Outpatient Medications   Medication Sig    losartan (COZAAR) 50 mg tablet Take 50 mg by mouth daily.  ferrous sulfate (Iron) 325 mg (65 mg iron) tablet Take 325 mg by mouth Daily (before breakfast).  medical supply, miscellaneous (OCUSOFT LID SCRUB) by Does Not Apply route. As needed    diclofenac (VOLTAREN) 1 % gel Apply  to affected area as needed for Pain.  acetaminophen (Tylenol Extra Strength) 500 mg tablet Take 500 mg by mouth three (3) times daily.  amLODIPine (NORVASC) 10 mg tablet Take 10 mg by mouth daily.     aspirin (ASPIRIN) 325 mg tablet Take 325 mg by mouth daily.  calcium-cholecalciferol, D3, (CALTRATE 600+D) tablet Take 1 Tab by mouth daily.  SITagliptin (Januvia) 50 mg tablet Take 50 mg by mouth daily.  pregabalin (LYRICA) 50 mg capsule Take 50 mg by mouth two (2) times a day.  metFORMIN (GLUCOPHAGE) 500 mg tablet Take 500 mg by mouth two (2) times daily (with meals).  estradioL (ESTRACE) 0.01 % (0.1 mg/gram) vaginal cream Insert 2 g into vagina. Twice a week     No current facility-administered medications for this visit. No Known Allergies      Review of Systems: Per HPI    Physical Examination:  Blood pressure (!) 128/43, pulse 73, temperature 97.9 °F (36.6 °C), temperature source Temporal, resp. rate 18, height 5' 4\" (1.626 m), weight 117 lb (53.1 kg), SpO2 95 %. Body mass index is 20.08 kg/m². - General: pleasant, no distress, good eye contact  - HEENT: no exophthalmos, no periorbital edema, EOMI  - Neck: No thyromegaly  - CVS: S1-S2 regular  - RS: Normal respiratory effort  - Musculoskeletal: no tremors  - Neurological: alert and oriented  - Psychiatric: normal mood and affect  - Skin: Normal color    Data Reviewed:     Lab Results   Component Value Date/Time    Calcium 8.2 (L) 05/01/2022 05:57 AM    Phosphorus 3.8 12/21/2021 12:48 PM     Lab Results   Component Value Date/Time    Vitamin D 25-Hydroxy 49.1 12/13/2021 10:57 AM       No results found for: TSH, TSH2, TSH3, TSHP, TSHEXT, TSHEXT  Lab Results   Component Value Date/Time    Sodium 139 05/01/2022 05:57 AM    Potassium 3.3 (L) 05/01/2022 05:57 AM    Chloride 109 (H) 05/01/2022 05:57 AM    CO2 24 05/01/2022 05:57 AM    Anion gap 6 05/01/2022 05:57 AM    Glucose 275 (H) 05/01/2022 05:57 AM    BUN 17 05/01/2022 05:57 AM    Creatinine 0.56 05/01/2022 05:57 AM    BUN/Creatinine ratio 30 (H) 05/01/2022 05:57 AM    GFR est AA >60 05/01/2022 05:57 AM    GFR est non-AA >60 05/01/2022 05:57 AM    Calcium 8.2 (L) 05/01/2022 05:57 AM       Assessment/Plan:     1. Age-related osteoporosis without current pathological fracture    2. Vitamin D deficiency        Age-related osteoporosis  Prolia started in November 2019 by me, prior to that she was on Prolia since 2014  Did not want IV Reclast  Getting it [facility] was December 2021  DEXA scan  Reviewed the x-ray on my own: Not intertrochanteric fracture, does not look like atypical fracture      Vitamin D deficiency      Hypertension: I think she could come off amlodipine, that way we can limit the number of medications. Defer to PCP    Hyperlipidemia: We will try to limit the medications, discontinue statin, there is no significant benefit of statin given her age  Could also be causing muscle weakness, falls      Discussed the plan with patient's friend as well as the patient  We can continue Prolia, risk of fracture is high given age  I do not think it is failure of Prolia    Hypocalcemia during hospitalization however albumin was also low  Before Prolia check ionized calcium    Reviewed Ortho notes, cardiology notes, labs    Spent > 40 minutes on the day of the visit reviewing chart, examining, ordering/reviewing labs, counseling, discussing therapeutics and documentation in the medical record    Thank you for allowing me to participate in the care of this patient. Flor London MD    Patient Radha Adjutant verbalized understanding      There are no Patient Instructions on file for this visit. Follow-up and Dispositions    · Return in about 6 months (around 12/15/2022). Voice-recognition software was used to generate this report, which may result in some phonetic-based errors in the grammar and contents. Even though attempts were made to correct all the mistakes, some may have been missed and remained in the body of the report.

## 2022-06-15 NOTE — PROGRESS NOTES
Marsha Salamanca is a 80 y.o. female here for   Chief Complaint   Patient presents with    Osteoporosis       1. Have you been to the ER, urgent care clinic since your last visit? Hospitalized since your last visit? -[facility] in April for hip fracture    2. Have you seen or consulted any other health care providers outside of the [address] [place] [address] since your last visit?   Include any pap smears or colon screening.-Ortho Dr. Sherri Main

## 2022-06-15 NOTE — LETTER
6/15/2022    Patient: John Green   YOB: 1923   Date of Visit: 6/15/2022     Natasha Bergman MD  [address] [address]  Via Fax: [phone]    Dear Natasha Bergman MD,      Thank you for referring Ms. Collette Cleary to [facility] DIAB[facility]CRINOLOGY for evaluation. My notes for this consultation are attached. If you have questions, please do not hesitate to call me. I look forward to following your patient along with you.       Sincerely,    Liza Pratt MD

## 2022-06-16 DIAGNOSIS — M81.0 AGE-RELATED OSTEOPOROSIS WITHOUT CURRENT PATHOLOGICAL FRACTURE: Primary | ICD-10-CM

## 2022-06-16 LAB
25(OH)D3 SERPL-MCNC: 63.4 NG/ML (ref 30–100)
ALBUMIN SERPL-MCNC: 3.5 G/DL (ref 3.5–5)
ALBUMIN/GLOB SERPL: 1.1 {RATIO} (ref 1.1–2.2)
ALP SERPL-CCNC: 77 U/L (ref 45–117)
ALT SERPL-CCNC: 15 U/L (ref 12–78)
ANION GAP SERPL CALC-SCNC: 6 MMOL/L (ref 5–15)
AST SERPL-CCNC: 12 U/L (ref 15–37)
BILIRUB SERPL-MCNC: 0.3 MG/DL (ref 0.2–1)
BUN SERPL-MCNC: 22 MG/DL (ref 6–20)
BUN/CREAT SERPL: 29 (ref 12–20)
CALCIUM SERPL-MCNC: 9.9 MG/DL (ref 8.5–10.1)
CHLORIDE SERPL-SCNC: 105 MMOL/L (ref 97–108)
CO2 SERPL-SCNC: 29 MMOL/L (ref 21–32)
CREAT SERPL-MCNC: 0.75 MG/DL (ref 0.55–1.02)
GLOBULIN SER CALC-MCNC: 3.1 G/DL (ref 2–4)
GLUCOSE SERPL-MCNC: 115 MG/DL (ref 65–100)
POTASSIUM SERPL-SCNC: 4 MMOL/L (ref 3.5–5.1)
PROT SERPL-MCNC: 6.6 G/DL (ref 6.4–8.2)
SODIUM SERPL-SCNC: 140 MMOL/L (ref 136–145)

## 2022-06-16 RX ORDER — DIPHENHYDRAMINE HYDROCHLORIDE 50 MG/ML
25 INJECTION, SOLUTION INTRAMUSCULAR; INTRAVENOUS AS NEEDED
Start: 2022-06-16

## 2022-06-16 RX ORDER — ACETAMINOPHEN 325 MG/1
650 TABLET ORAL AS NEEDED
Start: 2022-06-16

## 2022-06-16 RX ORDER — ONDANSETRON 2 MG/ML
8 INJECTION INTRAMUSCULAR; INTRAVENOUS AS NEEDED
OUTPATIENT
Start: 2022-06-16

## 2022-06-16 RX ORDER — DIPHENHYDRAMINE HYDROCHLORIDE 50 MG/ML
50 INJECTION, SOLUTION INTRAMUSCULAR; INTRAVENOUS AS NEEDED
Start: 2022-06-16

## 2022-06-16 RX ORDER — ALBUTEROL SULFATE 0.83 MG/ML
2.5 SOLUTION RESPIRATORY (INHALATION) AS NEEDED
Start: 2022-06-16

## 2022-06-16 RX ORDER — HYDROCORTISONE SODIUM SUCCINATE 100 MG/2ML
100 INJECTION, POWDER, FOR SOLUTION INTRAMUSCULAR; INTRAVENOUS AS NEEDED
OUTPATIENT
Start: 2022-06-16

## 2022-06-16 RX ORDER — EPINEPHRINE 1 MG/ML
0.3 INJECTION, SOLUTION, CONCENTRATE INTRAVENOUS AS NEEDED
OUTPATIENT
Start: 2022-06-16

## 2022-06-17 LAB — CA-I SERPL ISE-MCNC: 5.1 MG/DL (ref 4.5–5.6)

## 2022-06-22 ENCOUNTER — HOSPITAL ENCOUNTER (OUTPATIENT)
Dept: INFUSION THERAPY | Age: 87
Discharge: HOME | End: 2022-06-22
Payer: MEDICARE

## 2022-06-22 VITALS
HEIGHT: 61 IN | WEIGHT: 119.27 LBS | SYSTOLIC BLOOD PRESSURE: 119 MMHG | HEART RATE: 75 BPM | RESPIRATION RATE: 18 BRPM | TEMPERATURE: 97.7 F | OXYGEN SATURATION: 96 % | DIASTOLIC BLOOD PRESSURE: 51 MMHG | BODY MASS INDEX: 22.52 KG/M2

## 2022-06-22 PROCEDURE — 96372 THER/PROPH/DIAG INJ SC/IM: CPT

## 2022-06-22 PROCEDURE — 74011250636 HC RX REV CODE- 250/636: Performed by: INTERNAL MEDICINE

## 2022-06-22 RX ADMIN — DENOSUMAB 60 MG: 60 INJECTION SUBCUTANEOUS at 11:51

## 2022-06-22 NOTE — PROGRESS NOTES
Patient alert and oriented x4, VS stable, no complaints of pain after injection. Discharge instructions/education provided, patient verbalized understanding and had no questions. Patient voided. Patient ambulated self out with rollator and caregiver.

## 2022-06-22 NOTE — PROGRESS NOTES
Patient ambulated self in with rollator and caregiver, VS stable, no complaints of pain at this time. Labs checked and within 30 days.

## 2022-07-12 LAB — CREATININE, EXTERNAL: 0.63

## 2022-08-02 ENCOUNTER — OFFICE VISIT (OUTPATIENT)
Dept: ENT CLINIC | Age: 87
End: 2022-08-02
Payer: MEDICARE

## 2022-08-02 VITALS
RESPIRATION RATE: 18 BRPM | HEIGHT: 60 IN | BODY MASS INDEX: 23.36 KG/M2 | HEART RATE: 80 BPM | DIASTOLIC BLOOD PRESSURE: 70 MMHG | SYSTOLIC BLOOD PRESSURE: 110 MMHG | OXYGEN SATURATION: 98 % | WEIGHT: 119 LBS

## 2022-08-02 DIAGNOSIS — R04.0 EPISTAXIS: ICD-10-CM

## 2022-08-02 DIAGNOSIS — J34.89 OBSTRUCTION OF NASAL VALVE: Primary | ICD-10-CM

## 2022-08-02 DIAGNOSIS — J34.2 DEVIATED NASAL SEPTUM: ICD-10-CM

## 2022-08-02 DIAGNOSIS — H90.3 SENSORINEURAL HEARING LOSS (SNHL) OF BOTH EARS: ICD-10-CM

## 2022-08-02 PROCEDURE — 1090F PRES/ABSN URINE INCON ASSESS: CPT | Performed by: OTOLARYNGOLOGY

## 2022-08-02 PROCEDURE — 99214 OFFICE O/P EST MOD 30 MIN: CPT | Performed by: OTOLARYNGOLOGY

## 2022-08-02 PROCEDURE — 1101F PT FALLS ASSESS-DOCD LE1/YR: CPT | Performed by: OTOLARYNGOLOGY

## 2022-08-02 PROCEDURE — G8536 NO DOC ELDER MAL SCRN: HCPCS | Performed by: OTOLARYNGOLOGY

## 2022-08-02 PROCEDURE — G8510 SCR DEP NEG, NO PLAN REQD: HCPCS | Performed by: OTOLARYNGOLOGY

## 2022-08-02 PROCEDURE — 1123F ACP DISCUSS/DSCN MKR DOCD: CPT | Performed by: OTOLARYNGOLOGY

## 2022-08-02 PROCEDURE — G8427 DOCREV CUR MEDS BY ELIG CLIN: HCPCS | Performed by: OTOLARYNGOLOGY

## 2022-08-02 PROCEDURE — G8420 CALC BMI NORM PARAMETERS: HCPCS | Performed by: OTOLARYNGOLOGY

## 2022-08-02 NOTE — LETTER
8/2/2022    Patient: Kimberly Cervantes   YOB: 1923   Date of Visit: 8/2/2022     Mingo Greenfield MD  [facility] [address] [address]  Via Fax: [phone]    Dear Mingo Greenfield MD,      Thank you for referring Ms. Collette Cleary to [facility] for evaluation. My notes for this consultation are attached. If you have questions, please do not hesitate to call me. I look forward to following your patient along with you.       Sincerely,    Carl Garcia MD

## 2022-08-02 NOTE — PROGRESS NOTES
Subjective:    Manuela Cleary   80 y.o.   9/20/1923     Followup visit    Original HPI  Location - nose, ear    Quality - left ear blocked    Severity -  moderate    Duration - few weeks    Timing - ongoing, chronic    Context - f/u pt of [facility] - pt does have baseline right nasal obstruction, mostly from a Mohs recon to right nasal ala region in past; c/o intermittent crusting left side which causes obstruction as well; she does c/o left ear feels blocked lately also    Modifying Features - gel/saline helps some but not enough    Associated symptoms/signs - none    Follow-up HPI   5/24/2021 -6-month follow-up today, patient states symptoms are essentially the same she has some concerns over the outside of her nasal skin she is worried about the crusting on the outside and crusting on the inside potentially giving her risk for through and through hole in the nose, she continues with using gel daily and still feels the left nasal passage is more dry    2/2/2022 -9-month follow-up today patient says she is overall doing fine. Using saline gel for the left nasal cavity, does not report any significant bleeding. 8/2/2022 -6-month follow-up -reports no new issues or changes. She may have had 1 small nosebleed from the left side since last visit but nothing recurrent. She is now wearing her hearing aids much. Review of Systems  Review of Systems   Constitutional:  Negative for chills and fever. HENT:  Positive for congestion and hearing loss. Negative for ear pain and tinnitus. Eyes:  Negative for blurred vision and double vision. Respiratory:  Negative for cough, sputum production and shortness of breath. Cardiovascular:  Negative for chest pain and palpitations. Gastrointestinal:  Negative for heartburn, nausea and vomiting. Musculoskeletal:  Negative for joint pain and neck pain. Skin:  Positive for itching. Neurological:  Negative for dizziness, speech change, weakness and headaches. Endo/Heme/Allergies:  Negative for environmental allergies. Does not bruise/bleed easily. Psychiatric/Behavioral:  Negative for memory loss. The patient does not have insomnia. Past Medical History:   Diagnosis Date    DM (diabetes mellitus) ([facility]     High cholesterol     History of multiple allergies     HTN (hypertension)     Hypertension     Osteoporosis     Type 2 diabetes mellitus ([facility]      Prior to Admission medications    Medication Sig Start Date End Date Taking? Authorizing Provider   losartan (COZAAR) 50 mg tablet Take 50 mg by mouth daily. Provider, Historical   ferrous sulfate (Iron) 325 mg (65 mg iron) tablet Take 325 mg by mouth Daily (before breakfast). Provider, Historical   medical supply, miscellaneous (OCUSOFT LID SCRUB) by Does Not Apply route. As needed    Provider, Historical   diclofenac (VOLTAREN) 1 % gel Apply  to affected area as needed for Pain. Provider, Historical   acetaminophen (Tylenol Extra Strength) 500 mg tablet Take 500 mg by mouth three (3) times daily. Provider, Historical   amLODIPine (NORVASC) 10 mg tablet Take 10 mg by mouth daily. Provider, Historical   aspirin (ASPIRIN) 325 mg tablet Take 325 mg by mouth daily. Patient not taking: Reported on 6/22/2022    Provider, Historical   calcium-cholecalciferol, D3, (CALTRATE 600+D) tablet Take 1 Tab by mouth daily. Provider, Historical   SITagliptin (Januvia) 50 mg tablet Take 50 mg by mouth daily. Provider, Historical   pregabalin (LYRICA) 50 mg capsule Take 50 mg by mouth two (2) times a day. Provider, Historical   metFORMIN (GLUCOPHAGE) 500 mg tablet Take 500 mg by mouth two (2) times daily (with meals). Provider, Historical   estradioL (ESTRACE) 0.01 % (0.1 mg/gram) vaginal cream Insert 2 g into vagina.  Twice a week    Provider, Historical            Objective:     Visit Vitals  /70 (BP 1 Location: Left upper arm, BP Patient Position: Sitting, BP Cuff Size: Adult)   Pulse 80   Resp 18   Ht 5' (1.524 m)   Wt 119 lb (54 kg)   SpO2 98%   BMI 23.24 kg/m²          Physical Exam  Vitals reviewed. Constitutional:       General: She is awake. She is not in acute distress. Appearance: Normal appearance. She is well-groomed and normal weight. HENT:      Head: Normocephalic and atraumatic. Jaw: There is normal jaw occlusion. No trismus, tenderness or malocclusion. Salivary Glands: Right salivary gland is not diffusely enlarged or tender. Left salivary gland is not diffusely enlarged or tender. Comments: Dry ear canal     Right Ear: Tympanic membrane, ear canal and external ear normal. Decreased hearing noted. There is no impacted cerumen. Left Ear: Tympanic membrane, ear canal and external ear normal. Decreased hearing noted. There is no impacted cerumen. Ears:      Comments:        Nose: Septal deviation (caudal to right, posterior to left) present. No nasal deformity or mucosal edema. Right Nostril: No epistaxis. Left Nostril: No epistaxis. Right Turbinates: Not enlarged, swollen or pale. Left Turbinates: Not enlarged, swollen or pale. Right Sinus: No maxillary sinus tenderness or frontal sinus tenderness. Left Sinus: No maxillary sinus tenderness or frontal sinus tenderness. Comments: Well healed right nasal flap  Right caudal septum deviation, collapse of right anterior nasal valve  Left anterior septum with some dry mucus no bleeding       Mouth/Throat:      Lips: Pink. No lesions. Mouth: Mucous membranes are moist. No oral lesions. Dentition: Normal dentition. No dental caries. Tongue: No lesions. Palate: No mass and lesions. Pharynx: Oropharynx is clear. Uvula midline. No oropharyngeal exudate or posterior oropharyngeal erythema. Tonsils: No tonsillar exudate. 0 on the right. 0 on the left. Eyes:      General: Vision grossly intact. Extraocular Movements: Extraocular movements intact. Right eye: No nystagmus. Left eye: No nystagmus. Conjunctiva/sclera: Conjunctivae normal.      Pupils: Pupils are equal, round, and reactive to light. Neck:      Thyroid: No thyroid mass, thyromegaly or thyroid tenderness. Trachea: Trachea and phonation normal. No tracheal tenderness or tracheal deviation. Cardiovascular:      Rate and Rhythm: Normal rate and regular rhythm. Pulmonary:      Effort: Pulmonary effort is normal. No respiratory distress. Breath sounds: No stridor. Musculoskeletal:         General: No swelling or tenderness. Normal range of motion. Cervical back: No edema or erythema. Lymphadenopathy:      Cervical: No cervical adenopathy. Skin:     General: Skin is warm and dry. Findings: No lesion or rash. Neurological:      General: No focal deficit present. Mental Status: She is alert and oriented to person, place, and time. Mental status is at baseline. Cranial Nerves: Cranial nerves are intact. Coordination: Romberg sign negative. Gait: Gait is intact. Psychiatric:         Mood and Affect: Mood normal.         Behavior: Behavior normal. Behavior is cooperative. Assessment/Plan:     Encounter Diagnoses   Name Primary? Obstruction of nasal valve Yes    Deviated nasal septum     Epistaxis     Sensorineural hearing loss (SNHL) of both ears        Pt does have nasal valve collapse on right - would need surgery to address, which she agrees is not necessary  Cont left sided nasal moisturization with Ayr gel twice daily  External nose is healthy, patient reassured  Ears are clear today no cerumen impaction. Continue hearing aids as needed. Fu 6 mos or sooner if any frequent epistaxis    No orders of the defined types were placed in this encounter. Follow-up and Dispositions    Return in about 6 months (around 2/2/2023).

## 2022-11-17 LAB — CREATININE, EXTERNAL: 0.72

## 2022-12-07 ENCOUNTER — OFFICE VISIT (OUTPATIENT)
Dept: ENDOCRINOLOGY | Age: 87
End: 2022-12-07
Payer: MEDICARE

## 2022-12-07 VITALS
HEIGHT: 60 IN | HEART RATE: 75 BPM | DIASTOLIC BLOOD PRESSURE: 55 MMHG | BODY MASS INDEX: 23.03 KG/M2 | OXYGEN SATURATION: 98 % | WEIGHT: 117.3 LBS | SYSTOLIC BLOOD PRESSURE: 152 MMHG | RESPIRATION RATE: 18 BRPM | TEMPERATURE: 97 F

## 2022-12-07 DIAGNOSIS — M81.0 AGE-RELATED OSTEOPOROSIS WITHOUT CURRENT PATHOLOGICAL FRACTURE: Primary | ICD-10-CM

## 2022-12-07 DIAGNOSIS — E55.9 VITAMIN D DEFICIENCY: ICD-10-CM

## 2022-12-07 NOTE — LETTER
12/8/2022    Patient: Armaan Ardon   YOB: 1923   Date of Visit: 12/7/2022     Cheri Otero MD  [address] [address]  Via Fax: [phone]    Dear Cheri Otero MD,      Thank you for referring Ms. Collette Cleary to [facility] DIABETES & ENDOCRINOLOGY for evaluation. My notes for this consultation are attached. If you have questions, please do not hesitate to call me. I look forward to following your patient along with you.       Sincerely,    Zander Hancock MD

## 2022-12-07 NOTE — PROGRESS NOTES
Rosales Lockhart MD        Patient Information  Date:12/8/2022  Name : Satish Cleary 80 y.o.     YOB: 1923         Referred by: Ford Villatoro MD     [address] [phone]      Osteoporosis follow-up    History of Present Illness: Thania Irving is a 80 y.o. female here for follow-up of osteoporosis. She was diagnosed with osteoporosis 10 years ago, was on Prolia since 2014, [facility] got [facility]. She has underlying GERD, no peptic ulcer disease. Left hip fracture after a fall in 2017  Reddy Dotter in the bathroom, right hip fracture April 2022, had hemiarthroplasty  Reviewed the x-ray, no intertrochanteric fracture  She is on calcium and vitamin D supplements    Hypocalcemia during hospitalization, however albumin was low    History from December 2022  Yesterday felt cold,shaky, off balance, no fever - BP was checked. Pt advised to check BG and BP if that happens. Make sure to eat if she has not eaten. Sleeps well   No recent falls  No recent dental work  Continue Prolia    Dietary calcium Intake: Moderate amount of dairy in her diet. On calcium and vitamin D supplements. Past Medical History:   Diagnosis Date    DM (diabetes mellitus) ([address]     High cholesterol     History of multiple allergies     HTN (hypertension)     Hypertension     Osteoporosis     Type 2 diabetes mellitus (HCC)      Past Surgical History:   Procedure Laterality Date    HX APPENDECTOMY      HX HIP ARTHROSCOPY      HX HIP REPLACEMENT      HX HYSTERECTOMY      HX KNEE ARTHROSCOPY       Current Outpatient Medications   Medication Sig    losartan (COZAAR) 50 mg tablet Take 50 mg by mouth daily. ferrous sulfate 325 mg (65 mg iron) tablet Take 325 mg by mouth Daily (before breakfast). medical supply, miscellaneous (OCUSOFT LID SCRUB) by Does Not Apply route.  As needed    diclofenac (VOLTAREN) 1 % gel Apply  to affected area as needed for Pain. acetaminophen (TYLENOL) 500 mg tablet Take 500 mg by mouth three (3) times daily. amLODIPine (NORVASC) 10 mg tablet Take 10 mg by mouth daily. calcium-cholecalciferol, D3, (CALTRATE 600+D) tablet Take 1 Tab by mouth daily. SITagliptin phosphate (JANUVIA) 50 mg tablet Take 50 mg by mouth daily. pregabalin (LYRICA) 50 mg capsule Take 50 mg by mouth two (2) times a day. metFORMIN (GLUCOPHAGE) 500 mg tablet Take 500 mg by mouth two (2) times daily (with meals). estradioL (ESTRACE) 0.01 % (0.1 mg/gram) vaginal cream Insert 2 g into vagina. Twice a week    aspirin (ASPIRIN) 325 mg tablet Take 325 mg by mouth daily. (Patient not taking: No sig reported)     No current facility-administered medications for this visit. No Known Allergies      Review of Systems: Per HPI    Physical Examination:  Blood pressure (!) 152/55, pulse 75, temperature 97 °F (36.1 °C), temperature source Temporal, resp. rate 18, height 5' (1.524 m), weight 117 lb 4.8 oz (53.2 kg), SpO2 98 %. Body mass index is 22.91 kg/m².   General: pleasant, no distress, good eye contact  HEENT: no exophthalmos, no periorbital edema, EOMI  Neck: No thyromegaly  CVS: S1-S2 regular  RS: Normal respiratory effort  Musculoskeletal: no tremors  Neurological: alert and oriented  Psychiatric: normal mood and affect  Skin: Normal color    Data Reviewed:     Lab Results   Component Value Date/Time    Calcium 9.4 12/07/2022 12:00 PM    Phosphorus 3.8 12/21/2021 12:48 PM     Lab Results   Component Value Date/Time    Vitamin D 25-Hydroxy 45.1 12/07/2022 12:00 PM       No results found for: TSH, TSH2, TSH3, TSHP, TSHEXT, TSHEXT  Lab Results   Component Value Date/Time    Sodium 141 12/07/2022 12:00 PM    Potassium 4.0 12/07/2022 12:00 PM    Chloride 104 12/07/2022 12:00 PM    CO2 31 12/07/2022 12:00 PM    Anion gap 6 12/07/2022 12:00 PM    Glucose 120 (H) 12/07/2022 12:00 PM    BUN 24 (H) 12/07/2022 12:00 PM Creatinine 0.72 12/07/2022 12:00 PM    BUN/Creatinine ratio 33 (H) 12/07/2022 12:00 PM    GFR est AA >60 06/15/2022 11:54 AM    GFR est non-AA >60 06/15/2022 11:54 AM    Calcium 9.4 12/07/2022 12:00 PM       Assessment/Plan:     1. Age-related osteoporosis without current pathological fracture    2. Vitamin D deficiency          Age-related osteoporosis  Prolia started in November 2019 by me, prior to that she was on Prolia since 2014  Did not want IV Reclast  [facility]        Vitamin D deficiency      Hypertension:     Type 2 diabetes mellitus: Per friend A1c is close to 7      Discussed the plan with patient's friend as well as the patient  We can continue Prolia      Thank you for allowing me to participate in the care of this patient. Rashmi Quintero MD    Patient Annmarie Ibrahim verbalized understanding      There are no Patient Instructions on file for this visit. Follow-up and Dispositions    Return in about 6 months (around 6/7/2023). Voice-recognition software was used to generate this report, which may result in some phonetic-based errors in the grammar and contents. Even though attempts were made to correct all the mistakes, some may have been missed and remained in the body of the report.

## 2022-12-07 NOTE — PROGRESS NOTES
1. Have you been to the ER, urgent care clinic since your last visit? No Hospitalized since your last visit? No    2. Have you seen or consulted any other health care providers outside of the [address] [place] [address] since your last visit? Include any pap smears or colon screening.  No    Wt Readings from Last 3 Encounters:   12/07/22 117 lb 4.8 oz (53.2 kg)   08/02/22 119 lb (54 kg)   06/22/22 119 lb 4.3 oz (54.1 kg)     Temp Readings from Last 3 Encounters:   12/07/22 97 °F (36.1 °C) (Temporal)   06/22/22 97.7 °F (36.5 °C)   06/15/22 97.9 °F (36.6 °C) (Temporal)     BP Readings from Last 3 Encounters:   12/07/22 (!) 152/55   08/02/22 110/70   06/22/22 (!) 119/51     Pulse Readings from Last 3 Encounters:   12/07/22 75   08/02/22 80   06/22/22 75

## 2022-12-12 ENCOUNTER — HOSPITAL ENCOUNTER (EMERGENCY)
Age: 87
Discharge: HOME | End: 2022-12-12
Attending: STUDENT IN AN ORGANIZED HEALTH CARE EDUCATION/TRAINING PROGRAM
Payer: MEDICARE

## 2022-12-12 VITALS
OXYGEN SATURATION: 99 % | SYSTOLIC BLOOD PRESSURE: 132 MMHG | WEIGHT: 115 LBS | HEIGHT: 65 IN | HEART RATE: 77 BPM | TEMPERATURE: 97.8 F | DIASTOLIC BLOOD PRESSURE: 58 MMHG | RESPIRATION RATE: 16 BRPM | BODY MASS INDEX: 19.16 KG/M2

## 2022-12-12 DIAGNOSIS — I83.899 BLEEDING FROM VARICOSE VEIN: Primary | ICD-10-CM

## 2022-12-12 PROCEDURE — 99283 EMERGENCY DEPT VISIT LOW MDM: CPT

## 2022-12-12 NOTE — ED TRIAGE NOTES
GCS 15 pt stated that she had a knot on her ankle that started to bleed this am after placing her sock on; pt is not on any blood thinners; pt stated that she had lots of bleeding when it happened

## 2022-12-12 NOTE — ED PROVIDER NOTES
EMERGENCY DEPARTMENT HISTORY AND PHYSICAL EXAM      Date: 12/12/2022  Patient Name: Rosaura Meeks    History of Presenting Illness     Chief Complaint   Patient presents with    Other       History Provided By: Patient    HPI: Rosaura Meeks, 80 y.o. female with a past medical history significant diabetes and hypertension presents to the ED with cc of bleeding from left lower extremity. Patient states that she was putting on her socks when she rubbed a small bump which led to bleeding. Patient denies any pain denies any weakness dizziness. Patient did have moderate saturated pad on arrival.  However bleeding has been controlled. Patient is not on any anticoagulants. There are no other complaints, changes, or physical findings at this time. PCP: Bailee Geiger MD    No current facility-administered medications on file prior to encounter. Current Outpatient Medications on File Prior to Encounter   Medication Sig Dispense Refill    losartan (COZAAR) 50 mg tablet Take 50 mg by mouth daily. ferrous sulfate 325 mg (65 mg iron) tablet Take 325 mg by mouth Daily (before breakfast). medical supply, miscellaneous (OCUSOFT LID SCRUB) by Does Not Apply route. As needed      diclofenac (VOLTAREN) 1 % gel Apply  to affected area as needed for Pain. acetaminophen (TYLENOL) 500 mg tablet Take 500 mg by mouth three (3) times daily. amLODIPine (NORVASC) 10 mg tablet Take 10 mg by mouth daily. calcium-cholecalciferol, D3, (CALTRATE 600+D) tablet Take 1 Tab by mouth daily. SITagliptin phosphate (JANUVIA) 50 mg tablet Take 50 mg by mouth daily. pregabalin (LYRICA) 50 mg capsule Take 50 mg by mouth two (2) times a day. metFORMIN (GLUCOPHAGE) 500 mg tablet Take 500 mg by mouth two (2) times daily (with meals). estradioL (ESTRACE) 0.01 % (0.1 mg/gram) vaginal cream Insert 2 g into vagina.  Twice a week         Past History     Past Medical History:  Past Medical History:   Diagnosis Date    DM (diabetes mellitus) ([facility] [address]     High cholesterol     History of multiple allergies     HTN (hypertension)     Hypertension     Osteoporosis     Type 2 diabetes mellitus ([facility] [address]        Past Surgical History:  Past Surgical History:   Procedure Laterality Date    HX APPENDECTOMY      HX HIP ARTHROSCOPY      HX HIP REPLACEMENT      HX HYSTERECTOMY      HX KNEE ARTHROSCOPY         Family History:  Family History   Problem Relation Age of Onset    Diabetes Mother     Hypertension Mother     Osteoporosis Sister        Social History:  Social History     Tobacco Use    Smoking status: Never    Smokeless tobacco: Never   Substance Use Topics    Alcohol use: Never    Drug use: Never       Allergies:  No Known Allergies      Review of Systems     Review of Systems   Constitutional:  Negative for activity change, chills, fatigue and fever. HENT:  Negative for congestion and trouble swallowing. Eyes:  Negative for photophobia and visual disturbance. Respiratory:  Negative for cough, chest tightness and shortness of breath. Cardiovascular:  Negative for chest pain and palpitations. Gastrointestinal:  Negative for abdominal distention, abdominal pain, diarrhea, nausea and vomiting. Genitourinary:  Negative for dysuria, flank pain and frequency. Musculoskeletal:  Negative for arthralgias, back pain and myalgias. Skin:  Positive for wound. Negative for color change, pallor and rash. Neurological:  Negative for dizziness, tremors, weakness and headaches. Psychiatric/Behavioral:  Negative for confusion. Physical Exam     Physical Exam  Vitals and nursing note reviewed. Constitutional:       Appearance: Normal appearance. HENT:      Head: Normocephalic. Nose: Nose normal.      Mouth/Throat:      Mouth: Mucous membranes are moist.   Eyes:      Extraocular Movements: Extraocular movements intact. Pupils: Pupils are equal, round, and reactive to light.    Cardiovascular:      Rate and Rhythm: Normal rate and regular rhythm. Heart sounds: Normal heart sounds. Pulmonary:      Effort: Pulmonary effort is normal.      Breath sounds: Normal breath sounds. Abdominal:      General: Abdomen is flat. Musculoskeletal:         General: No swelling, tenderness or signs of injury. Normal range of motion. Cervical back: Normal range of motion and neck supple. Legs:    Skin:     General: Skin is warm and dry. Capillary Refill: Capillary refill takes less than 2 seconds. Neurological:      General: No focal deficit present. Mental Status: She is alert. Diagnostic Study Results     Labs -   No results found for this or any previous visit (from the past 12 hour(s)). Radiologic Studies -   @lastxrresult@  CT Results  (Last 48 hours)      None          CXR Results  (Last 48 hours)      None              Medical Decision Making   I am the first provider for this patient. I reviewed the vital signs, available nursing notes, past medical history, past surgical history, family history and social history. Vital Signs-Reviewed the patient's vital signs. Patient Vitals for the past 12 hrs:   Temp Pulse Resp BP SpO2   12/12/22 0844 97.8 °F (36.6 °C) 77 16 (!) 132/58 99 %       Records Reviewed: Nursing Notes    The patient presents with bleeding from varicose vein with a differential diagnosis of varicose vein bleed, arterial bleed, cellulitis      Provider Notes (Medical Decision Making):     MDM     59-year-old female history of diabetes, hypertension presents emergency department from apparent varicose bleed. Patient states she was putting on her socks when she noted small bleeding start from it vein to her leg, bleeding has been controlled from time of arrival.  No suspicion of significant hemorrhage. Physical shows well-appearing female no distress stable vitals, small scab noted to the left lower extremity no active bleeding.     We will dress patient with nonstick gauze, Kerlix, discharged back to assisted living. ED Course:   Initial assessment performed. The patients presenting problems have been discussed, and they are in agreement with the care plan formulated and outlined with them. I have encouraged them to ask questions as they arise throughout their visit. PROCEDURES  Procedures       PLAN:  1. Discharge Medication List as of 12/12/2022 11:08 AM        2. Follow-up Information       Follow up With Specialties Details Why Contact Info    Gera Avila MD Internal Medicine Physician In 1 week As needed C/ Severiano Braun [address] [place] [id number] [phone]            Return to ED if worse     Diagnosis     Clinical Impression:   1.  Bleeding from varicose vein

## 2023-01-03 ENCOUNTER — TELEPHONE (OUTPATIENT)
Dept: ENDOCRINOLOGY | Age: 88
End: 2023-01-03

## 2023-01-03 NOTE — TELEPHONE ENCOUNTER
Called pt and provided her with the number to [facility] and refaxed order as well. Pt verbalized understanding with no further questions or concerns at this time.

## 2023-01-04 ENCOUNTER — HOSPITAL ENCOUNTER (OUTPATIENT)
Dept: INFUSION THERAPY | Age: 88
Discharge: HOME | End: 2023-01-04
Payer: MEDICARE

## 2023-01-04 VITALS
DIASTOLIC BLOOD PRESSURE: 53 MMHG | TEMPERATURE: 98.8 F | RESPIRATION RATE: 18 BRPM | HEART RATE: 68 BPM | SYSTOLIC BLOOD PRESSURE: 148 MMHG | OXYGEN SATURATION: 100 %

## 2023-01-04 PROCEDURE — 96372 THER/PROPH/DIAG INJ SC/IM: CPT

## 2023-01-04 PROCEDURE — 74011250636 HC RX REV CODE- 250/636: Performed by: INTERNAL MEDICINE

## 2023-01-04 RX ADMIN — DENOSUMAB 60 MG: 60 INJECTION SUBCUTANEOUS at 10:55

## 2023-01-04 NOTE — PROGRESS NOTES
PROLIA INJECTION GIVEN IN LEFT ARM , PT TOLERATED WELL , INFORMation on medicine given to pt , pt has aide at bedside , no distress noted
no

## 2023-02-07 ENCOUNTER — OFFICE VISIT (OUTPATIENT)
Dept: ENT CLINIC | Age: 88
End: 2023-02-07
Payer: MEDICARE

## 2023-02-07 VITALS
HEIGHT: 65 IN | BODY MASS INDEX: 19.16 KG/M2 | DIASTOLIC BLOOD PRESSURE: 72 MMHG | WEIGHT: 115 LBS | RESPIRATION RATE: 18 BRPM | SYSTOLIC BLOOD PRESSURE: 124 MMHG | HEART RATE: 62 BPM | OXYGEN SATURATION: 97 %

## 2023-02-07 DIAGNOSIS — H61.23 BILATERAL IMPACTED CERUMEN: ICD-10-CM

## 2023-02-07 DIAGNOSIS — J34.2 DEVIATED NASAL SEPTUM: Primary | ICD-10-CM

## 2023-02-07 DIAGNOSIS — H90.3 SENSORINEURAL HEARING LOSS (SNHL) OF BOTH EARS: ICD-10-CM

## 2023-02-07 DIAGNOSIS — R04.0 EPISTAXIS: ICD-10-CM

## 2023-02-07 PROCEDURE — 1123F ACP DISCUSS/DSCN MKR DOCD: CPT | Performed by: OTOLARYNGOLOGY

## 2023-02-07 PROCEDURE — G8420 CALC BMI NORM PARAMETERS: HCPCS | Performed by: OTOLARYNGOLOGY

## 2023-02-07 PROCEDURE — G8536 NO DOC ELDER MAL SCRN: HCPCS | Performed by: OTOLARYNGOLOGY

## 2023-02-07 PROCEDURE — 69210 REMOVE IMPACTED EAR WAX UNI: CPT | Performed by: OTOLARYNGOLOGY

## 2023-02-07 PROCEDURE — G8510 SCR DEP NEG, NO PLAN REQD: HCPCS | Performed by: OTOLARYNGOLOGY

## 2023-02-07 PROCEDURE — 1101F PT FALLS ASSESS-DOCD LE1/YR: CPT | Performed by: OTOLARYNGOLOGY

## 2023-02-07 PROCEDURE — 99213 OFFICE O/P EST LOW 20 MIN: CPT | Performed by: OTOLARYNGOLOGY

## 2023-02-07 PROCEDURE — 1090F PRES/ABSN URINE INCON ASSESS: CPT | Performed by: OTOLARYNGOLOGY

## 2023-02-07 PROCEDURE — G8427 DOCREV CUR MEDS BY ELIG CLIN: HCPCS | Performed by: OTOLARYNGOLOGY

## 2023-02-07 NOTE — LETTER
2/7/2023    Patient: Lexi Hui   YOB: 1923   Date of Visit: 2/7/2023     Jamir Ortega MD  [address] [id number]  Via Fax: [phone]    Dear Jamir Ortega MD,      Thank you for referring Ms. Collette Cleary to [facility] for evaluation. My notes for this consultation are attached. If you have questions, please do not hesitate to call me. I look forward to following your patient along with you.       Sincerely,    Julianna Balbuena MD

## 2023-02-07 NOTE — PROGRESS NOTES
Subjective:    Antoinette Shepard Short   80 y.o.   9/20/1923     Followup visit    Original HPI  Location - nose, ear    Quality - left ear blocked    Severity -  moderate    Duration - few weeks    Timing - ongoing, chronic    Context - f/u pt of Ditto - pt does have baseline right nasal obstruction, mostly from a Mohs recon to right nasal ala region in past; c/o intermittent crusting left side which causes obstruction as well; she does c/o left ear feels blocked lately also    Modifying Features - gel/saline helps some but not enough    Associated symptoms/signs - none    Follow-up HPI   5/24/2021 -6-month follow-up today, patient states symptoms are essentially the same she has some concerns over the outside of her nasal skin she is worried about the crusting on the outside and crusting on the inside potentially giving her risk for through and through hole in the nose, she continues with using gel daily and still feels the left nasal passage is more dry    2/2/2022 -9-month follow-up today patient says she is overall doing fine. Using saline gel for the left nasal cavity, does not report any significant bleeding. 8/2/2022 -6-month follow-up -reports no new issues or changes. She may have had 1 small nosebleed from the left side since last visit but nothing recurrent. She is now wearing her hearing aids much. 2/7/2023  6-month follow-up. Reports no new issues. Continues to wear hearing aids. Occasional crusting in the left nose but no heavy nosebleeds    Review of Systems  Review of Systems   Constitutional:  Negative for chills and fever. HENT:  Positive for congestion and hearing loss. Negative for ear pain and tinnitus. Eyes:  Negative for blurred vision and double vision. Respiratory:  Negative for cough, sputum production and shortness of breath. Cardiovascular:  Negative for chest pain and palpitations. Gastrointestinal:  Negative for heartburn, nausea and vomiting.    Musculoskeletal:  Negative for joint pain and neck pain. Skin:  Positive for itching. Neurological:  Negative for dizziness, speech change, weakness and headaches. Endo/Heme/Allergies:  Negative for environmental allergies. Does not bruise/bleed easily. Psychiatric/Behavioral:  Negative for memory loss. The patient does not have insomnia. Past Medical History:   Diagnosis Date    DM (diabetes mellitus) ([facility] [address]     High cholesterol     History of multiple allergies     HTN (hypertension)     Hypertension     Osteoporosis     Type 2 diabetes mellitus ([facility]      Prior to Admission medications    Medication Sig Start Date End Date Taking? Authorizing Provider   losartan (COZAAR) 50 mg tablet Take 50 mg by mouth daily. Provider, Historical   ferrous sulfate 325 mg (65 mg iron) tablet Take 325 mg by mouth Daily (before breakfast). Provider, Historical   medical supply, miscellaneous (OCUSOFT LID SCRUB) by Does Not Apply route. As needed    Provider, Historical   diclofenac (VOLTAREN) 1 % gel Apply  to affected area as needed for Pain. Provider, Historical   acetaminophen (TYLENOL) 500 mg tablet Take 500 mg by mouth three (3) times daily. Provider, Historical   amLODIPine (NORVASC) 10 mg tablet Take 10 mg by mouth daily. Provider, Historical   calcium-cholecalciferol, D3, (CALTRATE 600+D) tablet Take 1 Tab by mouth daily. Provider, Historical   SITagliptin phosphate (JANUVIA) 50 mg tablet Take 50 mg by mouth daily. Provider, Historical   pregabalin (LYRICA) 50 mg capsule Take 50 mg by mouth two (2) times a day. Provider, Historical   metFORMIN (GLUCOPHAGE) 500 mg tablet Take 500 mg by mouth two (2) times daily (with meals). Provider, Historical   estradioL (ESTRACE) 0.01 % (0.1 mg/gram) vaginal cream Insert 2 g into vagina.  Twice a week    Provider, Historical            Objective:     Visit Vitals  /72 (BP 1 Location: Left upper arm, BP Patient Position: Sitting, BP Cuff Size: Adult)   Pulse 62   Resp 18   Ht 5' 5\" (1.651 m)   Wt 115 lb (52.2 kg)   SpO2 97%   BMI 19.14 kg/m²          Physical Exam  Vitals reviewed. Constitutional:       General: She is awake. She is not in acute distress. Appearance: Normal appearance. She is well-groomed and normal weight. HENT:      Head: Normocephalic and atraumatic. Jaw: There is normal jaw occlusion. No trismus, tenderness or malocclusion. Salivary Glands: Right salivary gland is not diffusely enlarged or tender. Left salivary gland is not diffusely enlarged or tender. Comments: Dry ear canal     Right Ear: Tympanic membrane, ear canal and external ear normal. Decreased hearing noted. There is impacted cerumen. Left Ear: Tympanic membrane, ear canal and external ear normal. Decreased hearing noted. There is impacted cerumen. Ears:      Comments:        Nose: Septal deviation (caudal to right, posterior to left) present. No nasal deformity or mucosal edema. Right Nostril: No epistaxis. Left Nostril: No epistaxis. Right Turbinates: Not enlarged, swollen or pale. Left Turbinates: Not enlarged, swollen or pale. Right Sinus: No maxillary sinus tenderness or frontal sinus tenderness. Left Sinus: No maxillary sinus tenderness or frontal sinus tenderness. Comments: Well healed right nasal flap  Right caudal septum deviation, collapse of right anterior nasal valve  Left anterior septum with some dry mucus no bleeding       Mouth/Throat:      Lips: Pink. No lesions. Mouth: Mucous membranes are moist. No oral lesions. Dentition: Normal dentition. No dental caries. Tongue: No lesions. Palate: No mass and lesions. Pharynx: Oropharynx is clear. Uvula midline. No oropharyngeal exudate or posterior oropharyngeal erythema. Tonsils: No tonsillar exudate. 0 on the right. 0 on the left. Eyes:      General: Vision grossly intact. Extraocular Movements: Extraocular movements intact. Right eye: No nystagmus. Left eye: No nystagmus. Conjunctiva/sclera: Conjunctivae normal.      Pupils: Pupils are equal, round, and reactive to light. Neck:      Thyroid: No thyroid mass, thyromegaly or thyroid tenderness. Trachea: Trachea and phonation normal. No tracheal tenderness or tracheal deviation. Cardiovascular:      Rate and Rhythm: Normal rate and regular rhythm. Pulmonary:      Effort: Pulmonary effort is normal. No respiratory distress. Breath sounds: No stridor. Musculoskeletal:         General: No swelling or tenderness. Normal range of motion. Cervical back: No edema or erythema. Lymphadenopathy:      Cervical: No cervical adenopathy. Skin:     General: Skin is warm and dry. Findings: No lesion or rash. Neurological:      General: No focal deficit present. Mental Status: She is alert and oriented to person, place, and time. Mental status is at baseline. Coordination: Romberg sign negative. Gait: Gait is intact. Psychiatric:         Mood and Affect: Mood normal.         Behavior: Behavior normal. Behavior is cooperative. Procedure - Removal Impacted Cerumen    Indications: cerumen impaction    Ears are examined under microscope/headlight.  bilateral ears are cleaned using otologic curette, suction, and/or alligator forceps. Assessment/Plan:     Encounter Diagnoses   Name Primary? Deviated nasal septum Yes    Epistaxis     Sensorineural hearing loss (SNHL) of both ears     Bilateral impacted cerumen      Cont left sided nasal moisturization with Ayr gel twice daily  Ears are impacted today, cleaned continue hearing aids as needed. Fu 6 mos or sooner if any frequent epistaxis    Orders Placed This Encounter    REMOVE IMPACTED EAR WAX       Follow-up and Dispositions    Return in about 6 months (around 8/7/2023).

## 2023-04-22 DIAGNOSIS — M81.0 AGE-RELATED OSTEOPOROSIS WITHOUT CURRENT PATHOLOGICAL FRACTURE: Primary | ICD-10-CM

## 2023-04-22 DIAGNOSIS — E55.9 VITAMIN D DEFICIENCY: ICD-10-CM

## 2023-04-23 DIAGNOSIS — E55.9 VITAMIN D DEFICIENCY: ICD-10-CM

## 2023-04-23 DIAGNOSIS — M81.0 AGE-RELATED OSTEOPOROSIS WITHOUT CURRENT PATHOLOGICAL FRACTURE: Primary | ICD-10-CM

## 2023-05-24 RX ORDER — ACETAMINOPHEN 500 MG
500 TABLET ORAL 3 TIMES DAILY
COMMUNITY

## 2023-05-24 RX ORDER — CALCIUM CARBONATE/VITAMIN D3 600 MG-10
1 TABLET ORAL DAILY
COMMUNITY

## 2023-05-24 RX ORDER — AMLODIPINE BESYLATE 10 MG/1
10 TABLET ORAL DAILY
COMMUNITY

## 2023-05-24 RX ORDER — FERROUS SULFATE 325(65) MG
325 TABLET ORAL
COMMUNITY

## 2023-05-24 RX ORDER — ESTRADIOL 0.1 MG/G
2 CREAM VAGINAL
COMMUNITY

## 2023-05-24 RX ORDER — LOSARTAN POTASSIUM 50 MG/1
50 TABLET ORAL DAILY
COMMUNITY

## 2023-05-24 RX ORDER — PREGABALIN 50 MG/1
50 CAPSULE ORAL 2 TIMES DAILY
COMMUNITY

## 2023-06-14 PROBLEM — M17.12 OSTEOARTHRITIS OF LEFT KNEE: Status: ACTIVE | Noted: 2021-09-16

## 2023-06-14 PROBLEM — Z96.651 HISTORY OF TOTAL RIGHT KNEE REPLACEMENT: Status: ACTIVE | Noted: 2021-09-16

## 2023-06-14 PROBLEM — M70.52 PES ANSERINUS BURSITIS OF LEFT KNEE: Status: ACTIVE | Noted: 2021-09-16

## 2023-06-14 PROBLEM — I10 HYPERTENSION: Status: ACTIVE | Noted: 2019-05-15

## 2023-06-14 PROBLEM — M81.8 ADULT IDIOPATHIC GENERALIZED OSTEOPOROSIS: Status: ACTIVE | Noted: 2021-09-16

## 2023-06-14 PROBLEM — M70.62 TROCHANTERIC BURSITIS, LEFT HIP: Status: ACTIVE | Noted: 2019-10-30

## 2023-06-14 PROBLEM — Z96.642 HISTORY OF HEMIARTHROPLASTY OF LEFT HIP: Status: ACTIVE | Noted: 2019-10-30

## 2023-06-14 PROBLEM — E11.9 TYPE 2 DIABETES MELLITUS WITHOUT COMPLICATIONS (HCC): Status: ACTIVE | Noted: 2019-05-15

## 2023-06-14 PROBLEM — L60.3 NAIL DYSTROPHY: Status: ACTIVE | Noted: 2019-05-15

## 2023-06-14 PROBLEM — E78.00 HYPERCHOLESTEROLEMIA: Status: ACTIVE | Noted: 2019-05-15

## 2023-07-05 ENCOUNTER — HOSPITAL ENCOUNTER (OUTPATIENT)
Facility: HOSPITAL | Age: 88
Setting detail: INFUSION SERIES
End: 2023-07-05
Payer: MEDICARE

## 2023-07-05 VITALS
TEMPERATURE: 98 F | DIASTOLIC BLOOD PRESSURE: 53 MMHG | OXYGEN SATURATION: 97 % | HEART RATE: 73 BPM | SYSTOLIC BLOOD PRESSURE: 129 MMHG | RESPIRATION RATE: 18 BRPM

## 2023-07-05 PROBLEM — M81.0 SENILE OSTEOPOROSIS: Status: ACTIVE | Noted: 2023-07-05

## 2023-07-05 PROCEDURE — 96372 THER/PROPH/DIAG INJ SC/IM: CPT

## 2023-07-05 PROCEDURE — 6360000002 HC RX W HCPCS: Performed by: INTERNAL MEDICINE

## 2023-07-05 RX ADMIN — DENOSUMAB 60 MG: 60 INJECTION SUBCUTANEOUS at 14:36

## 2023-08-08 ENCOUNTER — OFFICE VISIT (OUTPATIENT)
Age: 88
End: 2023-08-08
Payer: MEDICARE

## 2023-08-08 VITALS
BODY MASS INDEX: 21.56 KG/M2 | OXYGEN SATURATION: 98 % | HEIGHT: 65 IN | HEART RATE: 74 BPM | WEIGHT: 129.4 LBS | DIASTOLIC BLOOD PRESSURE: 45 MMHG | SYSTOLIC BLOOD PRESSURE: 138 MMHG | RESPIRATION RATE: 17 BRPM

## 2023-08-08 DIAGNOSIS — H90.3 SENSORINEURAL HEARING LOSS, BILATERAL: ICD-10-CM

## 2023-08-08 DIAGNOSIS — J34.2 DEVIATED NASAL SEPTUM: Primary | ICD-10-CM

## 2023-08-08 DIAGNOSIS — H61.21 IMPACTED CERUMEN OF RIGHT EAR: ICD-10-CM

## 2023-08-08 PROCEDURE — 1036F TOBACCO NON-USER: CPT | Performed by: OTOLARYNGOLOGY

## 2023-08-08 PROCEDURE — G8420 CALC BMI NORM PARAMETERS: HCPCS | Performed by: OTOLARYNGOLOGY

## 2023-08-08 PROCEDURE — G8427 DOCREV CUR MEDS BY ELIG CLIN: HCPCS | Performed by: OTOLARYNGOLOGY

## 2023-08-08 PROCEDURE — 99213 OFFICE O/P EST LOW 20 MIN: CPT | Performed by: OTOLARYNGOLOGY

## 2023-08-08 PROCEDURE — 1123F ACP DISCUSS/DSCN MKR DOCD: CPT | Performed by: OTOLARYNGOLOGY

## 2023-08-08 PROCEDURE — 1090F PRES/ABSN URINE INCON ASSESS: CPT | Performed by: OTOLARYNGOLOGY

## 2023-08-08 PROCEDURE — 69210 REMOVE IMPACTED EAR WAX UNI: CPT | Performed by: OTOLARYNGOLOGY

## 2023-08-08 NOTE — PROGRESS NOTES
George Gold is a 80 y.o. female here for   Chief Complaint   Patient presents with    Follow-up     6 month/ deviated nasal septum       All medication reviewed. Vitals:    08/08/23 1001   BP: (!) 138/45   Site: Left Upper Arm   Position: Sitting   Cuff Size: Medium Adult   Pulse: 74   Resp: 17   SpO2: 98%   Weight: 129 lb 6.4 oz (58.7 kg)   Height: 5' 5\" (1.651 m)        1. Have you been to the ER, urgent care clinic since your last visit? Hospitalized since your last visit? -No    2. Have you seen or consulted any other health care providers outside of the [address] [place] [address] since your last visit?   Include any pap smears or colon screening.-No

## 2023-08-08 NOTE — PROGRESS NOTES
Subjective:      Minerva Aase Short    80 y.o.    9/20/1923       Followup visit      Original HPI   Location - nose, ear      Quality - left ear blocked      Severity -  moderate      Duration - few weeks      Timing - ongoing, chronic      Context - f/u pt of Ditto - pt does have baseline right nasal obstruction, mostly from a Mohs recon to right nasal ala region in past; c/o intermittent crusting left side which causes obstruction as well; she does c/o left ear feels blocked lately also      Modifying Features - gel/saline helps some but not enough      Associated symptoms/signs - none      Follow-up HPI    5/24/2021 -6-month follow-up today, patient states symptoms are essentially the same she has some concerns over the outside of her nasal skin she is worried about the crusting on the outside and crusting  on the inside potentially giving her risk for through and through hole in the nose, she continues with using gel daily and still feels the left nasal passage is more dry      2/2/2022 -9-month follow-up today patient says she is overall doing fine. Using saline gel for the left nasal cavity, does not report any significant bleeding. 8/2/2022 -6-month follow-up -reports no new issues or changes. She may have had 1 small nosebleed from the left side since last visit but nothing recurrent. She is now wearing her hearing aids much. 2/7/2023   6-month follow-up. Reports no new issues. Continues to wear hearing aids. Occasional crusting in the left nose but no heavy nosebleeds    8/8/23  6 mos f/u no new issues, no bleeding;  still some left sided nasal congestion. Review of Systems   Review of Systems    Constitutional:  Negative for chills and fever. HENT:  Positive for congestion and hearing loss . Negative for ear pain and tinnitus. Eyes:  Negative for blurred vision and double vision. Respiratory:  Negative for cough, sputum production and shortness of breath.

## 2024-01-05 DIAGNOSIS — M81.0 AGE-RELATED OSTEOPOROSIS WITHOUT CURRENT PATHOLOGICAL FRACTURE: Primary | ICD-10-CM

## 2024-01-05 DIAGNOSIS — E55.9 VITAMIN D DEFICIENCY, UNSPECIFIED: ICD-10-CM

## 2024-01-08 ENCOUNTER — NURSE ONLY (OUTPATIENT)
Age: 89
End: 2024-01-08

## 2024-01-08 DIAGNOSIS — E55.9 VITAMIN D DEFICIENCY, UNSPECIFIED: ICD-10-CM

## 2024-01-08 DIAGNOSIS — M81.0 AGE-RELATED OSTEOPOROSIS WITHOUT CURRENT PATHOLOGICAL FRACTURE: ICD-10-CM

## 2024-01-08 LAB
25(OH)D3 SERPL-MCNC: 35.4 NG/ML (ref 30–100)
ANION GAP SERPL CALC-SCNC: 6 MMOL/L (ref 5–15)
BUN SERPL-MCNC: 30 MG/DL (ref 6–20)
BUN/CREAT SERPL: 31 (ref 12–20)
CALCIUM SERPL-MCNC: 9.4 MG/DL (ref 8.5–10.1)
CHLORIDE SERPL-SCNC: 104 MMOL/L (ref 97–108)
CO2 SERPL-SCNC: 30 MMOL/L (ref 21–32)
CREAT SERPL-MCNC: 0.96 MG/DL (ref 0.55–1.02)
GLUCOSE SERPL-MCNC: 176 MG/DL (ref 65–100)
POTASSIUM SERPL-SCNC: 3.8 MMOL/L (ref 3.5–5.1)
SODIUM SERPL-SCNC: 140 MMOL/L (ref 136–145)

## 2024-01-15 ENCOUNTER — OFFICE VISIT (OUTPATIENT)
Age: 89
End: 2024-01-15
Payer: MEDICARE

## 2024-01-15 VITALS
OXYGEN SATURATION: 97 % | TEMPERATURE: 98.1 F | RESPIRATION RATE: 17 BRPM | DIASTOLIC BLOOD PRESSURE: 48 MMHG | BODY MASS INDEX: 22.14 KG/M2 | SYSTOLIC BLOOD PRESSURE: 132 MMHG | HEART RATE: 76 BPM | WEIGHT: 132.9 LBS | HEIGHT: 65 IN

## 2024-01-15 DIAGNOSIS — E55.9 VITAMIN D DEFICIENCY: ICD-10-CM

## 2024-01-15 DIAGNOSIS — M81.0 SENILE OSTEOPOROSIS: Primary | ICD-10-CM

## 2024-01-15 PROCEDURE — 1036F TOBACCO NON-USER: CPT | Performed by: INTERNAL MEDICINE

## 2024-01-15 PROCEDURE — 99214 OFFICE O/P EST MOD 30 MIN: CPT | Performed by: INTERNAL MEDICINE

## 2024-01-15 PROCEDURE — 1090F PRES/ABSN URINE INCON ASSESS: CPT | Performed by: INTERNAL MEDICINE

## 2024-01-15 PROCEDURE — G8420 CALC BMI NORM PARAMETERS: HCPCS | Performed by: INTERNAL MEDICINE

## 2024-01-15 PROCEDURE — G8484 FLU IMMUNIZE NO ADMIN: HCPCS | Performed by: INTERNAL MEDICINE

## 2024-01-15 PROCEDURE — G2211 COMPLEX E/M VISIT ADD ON: HCPCS | Performed by: INTERNAL MEDICINE

## 2024-01-15 PROCEDURE — G8427 DOCREV CUR MEDS BY ELIG CLIN: HCPCS | Performed by: INTERNAL MEDICINE

## 2024-01-15 PROCEDURE — 1123F ACP DISCUSS/DSCN MKR DOCD: CPT | Performed by: INTERNAL MEDICINE

## 2024-01-15 NOTE — PROGRESS NOTES
[facility]                 Suzi Mishra MD            Patient Information   Date:12/8/2022   Name : Roxanne Hill 89 y.o.       YOB: 1923           Referred by: Muriel Navarro MD       [facility] [phone]         Osteoporosis follow-up      History of Present Illness: Roxanne Hill is a  89 y.o. female here for follow-up of osteoporosis.      She was diagnosed with osteoporosis 10 years ago, was on Prolia since 2014, initially got it from [facility]   Tolerated Prolia well.  She has underlying GERD, no peptic ulcer disease.   Left hip fracture after a fall in 2017   Fell in the bathroom, right hip fracture April 2022, had hemiarthroplasty. Reviewed the x-ray, no intertrochanteric fracture  She is getting Prolia at the hospital,  Here with a friend            History from December 2022   Yesterday felt cold,shaky, off balance, no fever - BP was checked.  Pt advised to check BG and BP if that happens. Make sure to eat if she has not eaten.   Sleeps well    No recent falls   No recent dental work   Continue Prolia      Dietary calcium Intake:   Moderate amount of dairy in her diet.   On calcium and vitamin D supplements.            Physical Examination:      General: pleasant, no distress, good eye contact    HEENT: no exophthalmos, no periorbital edema, EOMI    Neck: No thyromegaly    CVS: S1-S2 regular    RS: Normal respiratory effort    Musculoskeletal: no tremors    Neurological: alert and oriented    Psychiatric: normal mood and affect  Left lower extremity: Erythema, normal temperature, feeble dorsalis pedis pulse, venous stasis changes, scheduled to see vascular in 2 days  No signs of infection      Data Reviewed:         Lab Results   Component Value Date    CALCIUM 9.4 01/08/2024    PHOS 3.8 12/21/2021      Lab Results   Component Value Date/Time     01/08/2024 11:13 AM    K 3.8 01/08/2024 11:13 AM     01/08/2024 11:13 AM

## 2024-01-15 NOTE — PROGRESS NOTES
Roxanne Hill is a 89 y.o. female here for   Chief Complaint   Patient presents with    Osteoporosis       1. Have you been to the ER, urgent care clinic since your last visit?  Hospitalized since your last visit? - No    2. Have you seen or consulted any other health care providers outside of the [facility] since your last visit?  Include any pap smears or colon screening.- PCP

## 2024-01-15 NOTE — PATIENT INSTRUCTIONS
Important     I have ordered medication/test and if you do not hear from the hospital in 7 business days  please call the number below for infusion center    \    [facility]   [phone]

## 2024-01-24 ENCOUNTER — HOSPITAL ENCOUNTER (OUTPATIENT)
Facility: HOSPITAL | Age: 89
Setting detail: INFUSION SERIES
Discharge: HOME | End: 2024-01-24
Payer: MEDICARE

## 2024-01-24 PROCEDURE — 96372 THER/PROPH/DIAG INJ SC/IM: CPT

## 2024-01-24 PROCEDURE — 6360000002 HC RX W HCPCS: Performed by: INTERNAL MEDICINE

## 2024-01-24 RX ADMIN — DENOSUMAB 60 MG: 60 INJECTION SUBCUTANEOUS at 10:47

## 2024-01-24 NOTE — PERIOP NOTE
Injection given in left upper arm-- no complaints or issues at this time. Next appt scheduled for July. Pt wheeled off unit via wheelchair with family.

## 2024-01-30 ENCOUNTER — HOSPITAL ENCOUNTER (OUTPATIENT)
Facility: HOSPITAL | Age: 89
Discharge: HOME | End: 2024-02-02
Payer: MEDICARE

## 2024-01-30 DIAGNOSIS — I73.9 PERIPHERAL VASCULAR DISEASE, UNSPECIFIED (HCC): ICD-10-CM

## 2024-01-30 DIAGNOSIS — S93.119A: ICD-10-CM

## 2024-01-30 DIAGNOSIS — S91.109A: ICD-10-CM

## 2024-01-30 DIAGNOSIS — I74.5 EMBOLISM AND THROMBOSIS OF ILIAC ARTERY (HCC): ICD-10-CM

## 2024-01-30 PROCEDURE — 6360000004 HC RX CONTRAST MEDICATION: Performed by: SURGERY

## 2024-01-30 PROCEDURE — 75635 CT ANGIO ABDOMINAL ARTERIES: CPT

## 2024-01-30 RX ADMIN — IOPAMIDOL 100 ML: 755 INJECTION, SOLUTION INTRAVENOUS at 13:10

## 2024-02-28 ENCOUNTER — HOSPITAL ENCOUNTER (EMERGENCY)
Facility: HOSPITAL | Age: 89
Discharge: HOME | End: 2024-02-28
Attending: EMERGENCY MEDICINE
Payer: MEDICARE

## 2024-02-28 ENCOUNTER — APPOINTMENT (OUTPATIENT)
Facility: HOSPITAL | Age: 89
End: 2024-02-28
Payer: MEDICARE

## 2024-02-28 VITALS
SYSTOLIC BLOOD PRESSURE: 150 MMHG | HEART RATE: 85 BPM | RESPIRATION RATE: 18 BRPM | TEMPERATURE: 98 F | BODY MASS INDEX: 21.66 KG/M2 | DIASTOLIC BLOOD PRESSURE: 59 MMHG | HEIGHT: 65 IN | OXYGEN SATURATION: 92 % | WEIGHT: 130 LBS

## 2024-02-28 DIAGNOSIS — L08.9 FOOT INFECTION: Primary | ICD-10-CM

## 2024-02-28 LAB
ALBUMIN SERPL-MCNC: 3 G/DL (ref 3.5–5)
ALBUMIN/GLOB SERPL: 0.7 (ref 1.1–2.2)
ALP SERPL-CCNC: 73 U/L (ref 45–117)
ALT SERPL-CCNC: 18 U/L (ref 12–78)
ANION GAP SERPL CALC-SCNC: 3 MMOL/L (ref 5–15)
AST SERPL W P-5'-P-CCNC: 13 U/L (ref 15–37)
BASOPHILS # BLD: 0 K/UL (ref 0–0.1)
BASOPHILS NFR BLD: 0 % (ref 0–1)
BILIRUB SERPL-MCNC: 0.3 MG/DL (ref 0.2–1)
BUN SERPL-MCNC: 23 MG/DL (ref 6–20)
BUN/CREAT SERPL: 27 (ref 12–20)
CA-I BLD-MCNC: 9.1 MG/DL (ref 8.5–10.1)
CHLORIDE SERPL-SCNC: 104 MMOL/L (ref 97–108)
CO2 SERPL-SCNC: 30 MMOL/L (ref 21–32)
CREAT SERPL-MCNC: 0.84 MG/DL (ref 0.55–1.02)
CRP SERPL-MCNC: 1.04 MG/DL (ref 0–0.3)
DIFFERENTIAL METHOD BLD: NORMAL
EOSINOPHIL # BLD: 0.3 K/UL (ref 0–0.4)
EOSINOPHIL NFR BLD: 4 % (ref 0–7)
ERYTHROCYTE [DISTWIDTH] IN BLOOD BY AUTOMATED COUNT: 13.5 % (ref 11.5–14.5)
ERYTHROCYTE [SEDIMENTATION RATE] IN BLOOD: 63 MM/HR (ref 0–30)
GLOBULIN SER CALC-MCNC: 4.3 G/DL (ref 2–4)
GLUCOSE SERPL-MCNC: 187 MG/DL (ref 65–100)
HCT VFR BLD AUTO: 37.8 % (ref 35–47)
HGB BLD-MCNC: 12.3 G/DL (ref 11.5–16)
IMM GRANULOCYTES # BLD AUTO: 0 K/UL (ref 0–0.04)
IMM GRANULOCYTES NFR BLD AUTO: 0 % (ref 0–0.5)
LACTATE SERPL-SCNC: 1 MMOL/L (ref 0.4–2)
LYMPHOCYTES # BLD: 1 K/UL (ref 0.8–3.5)
LYMPHOCYTES NFR BLD: 15 % (ref 12–49)
MCH RBC QN AUTO: 28.4 PG (ref 26–34)
MCHC RBC AUTO-ENTMCNC: 32.5 G/DL (ref 30–36.5)
MCV RBC AUTO: 87.3 FL (ref 80–99)
MONOCYTES # BLD: 0.6 K/UL (ref 0–1)
MONOCYTES NFR BLD: 9 % (ref 5–13)
NEUTS SEG # BLD: 4.9 K/UL (ref 1.8–8)
NEUTS SEG NFR BLD: 72 % (ref 32–75)
NRBC # BLD: 0 K/UL (ref 0–0.01)
NRBC BLD-RTO: 0 PER 100 WBC
PLATELET # BLD AUTO: 255 K/UL (ref 150–400)
PMV BLD AUTO: 10.3 FL (ref 8.9–12.9)
POTASSIUM SERPL-SCNC: 3.9 MMOL/L (ref 3.5–5.1)
PROCALCITONIN SERPL-MCNC: 0.08 NG/ML
PROT SERPL-MCNC: 7.3 G/DL (ref 6.4–8.2)
RBC # BLD AUTO: 4.33 M/UL (ref 3.8–5.2)
SODIUM SERPL-SCNC: 137 MMOL/L (ref 136–145)
WBC # BLD AUTO: 6.7 K/UL (ref 3.6–11)

## 2024-02-28 PROCEDURE — 99284 EMERGENCY DEPT VISIT MOD MDM: CPT

## 2024-02-28 PROCEDURE — 80053 COMPREHEN METABOLIC PANEL: CPT

## 2024-02-28 PROCEDURE — 85652 RBC SED RATE AUTOMATED: CPT

## 2024-02-28 PROCEDURE — 2580000003 HC RX 258: Performed by: EMERGENCY MEDICINE

## 2024-02-28 PROCEDURE — 6360000002 HC RX W HCPCS: Performed by: EMERGENCY MEDICINE

## 2024-02-28 PROCEDURE — 36415 COLL VENOUS BLD VENIPUNCTURE: CPT

## 2024-02-28 PROCEDURE — 85025 COMPLETE CBC W/AUTO DIFF WBC: CPT

## 2024-02-28 PROCEDURE — 96374 THER/PROPH/DIAG INJ IV PUSH: CPT

## 2024-02-28 PROCEDURE — 86140 C-REACTIVE PROTEIN: CPT

## 2024-02-28 PROCEDURE — 84145 PROCALCITONIN (PCT): CPT

## 2024-02-28 PROCEDURE — 83605 ASSAY OF LACTIC ACID: CPT

## 2024-02-28 PROCEDURE — 73630 X-RAY EXAM OF FOOT: CPT

## 2024-02-28 RX ORDER — AMOXICILLIN AND CLAVULANATE POTASSIUM 875; 125 MG/1; MG/1
1 TABLET, FILM COATED ORAL 2 TIMES DAILY
Qty: 20 TABLET | Refills: 0 | Status: ON HOLD | OUTPATIENT
Start: 2024-02-28 | End: 2024-03-09

## 2024-02-28 RX ADMIN — CEFTRIAXONE SODIUM 1000 MG: 1 INJECTION, POWDER, FOR SOLUTION INTRAMUSCULAR; INTRAVENOUS at 14:44

## 2024-02-28 ASSESSMENT — LIFESTYLE VARIABLES
HOW MANY STANDARD DRINKS CONTAINING ALCOHOL DO YOU HAVE ON A TYPICAL DAY: PATIENT DOES NOT DRINK
HOW OFTEN DO YOU HAVE A DRINK CONTAINING ALCOHOL: NEVER

## 2024-02-28 ASSESSMENT — PAIN SCALES - GENERAL: PAINLEVEL_OUTOF10: 0

## 2024-02-28 ASSESSMENT — PAIN - FUNCTIONAL ASSESSMENT: PAIN_FUNCTIONAL_ASSESSMENT: NONE - DENIES PAIN

## 2024-02-28 NOTE — DISCHARGE INSTRUCTIONS
Thank you!  Thank you for allowing me to care for you in the emergency department. It is my goal to provide you with excellent care.  Please fill out the survey that will come to you by mail or email since we listen to your feedback!     Below you will find a list of your tests from today's visit.  Should you have any questions, please do not hesitate to call the emergency department.    Labs  Recent Results (from the past 12 hour(s))   CBC with Auto Differential    Collection Time: 02/28/24 12:52 PM   Result Value Ref Range    WBC 6.7 3.6 - 11.0 K/uL    RBC 4.33 3.80 - 5.20 M/uL    Hemoglobin 12.3 11.5 - 16.0 g/dL    Hematocrit 37.8 35.0 - 47.0 %    MCV 87.3 80.0 - 99.0 FL    MCH 28.4 26.0 - 34.0 PG    MCHC 32.5 30.0 - 36.5 g/dL    RDW 13.5 11.5 - 14.5 %    Platelets 255 150 - 400 K/uL    MPV 10.3 8.9 - 12.9 FL    Nucleated RBCs 0.0 0.0  WBC    nRBC 0.00 0.00 - 0.01 K/uL    Neutrophils % 72 32 - 75 %    Lymphocytes % 15 12 - 49 %    Monocytes % 9 5 - 13 %    Eosinophils % 4 0 - 7 %    Basophils % 0 0 - 1 %    Immature Granulocytes 0 0 - 0.5 %    Neutrophils Absolute 4.9 1.8 - 8.0 K/UL    Lymphocytes Absolute 1.0 0.8 - 3.5 K/UL    Monocytes Absolute 0.6 0.0 - 1.0 K/UL    Eosinophils Absolute 0.3 0.0 - 0.4 K/UL    Basophils Absolute 0.0 0.0 - 0.1 K/UL    Absolute Immature Granulocyte 0.0 0.00 - 0.04 K/UL    Differential Type AUTOMATED     CMP    Collection Time: 02/28/24 12:52 PM   Result Value Ref Range    Sodium 137 136 - 145 mmol/L    Potassium 3.9 3.5 - 5.1 mmol/L    Chloride 104 97 - 108 mmol/L    CO2 30 21 - 32 mmol/L    Anion Gap 3 (L) 5 - 15 mmol/L    Glucose 187 (H) 65 - 100 mg/dL    BUN 23 (H) 6 - 20 mg/dL    Creatinine 0.84 0.55 - 1.02 mg/dL    Bun/Cre Ratio 27 (H) 12 - 20      Est, Glom Filt Rate >60 >60 ml/min/1.73m2    Calcium 9.1 8.5 - 10.1 mg/dL    Total Bilirubin 0.3 0.2 - 1.0 mg/dL    AST 13 (L) 15 - 37 U/L    ALT 18 12 - 78 U/L    Alk Phosphatase 73 45 - 117 U/L    Total Protein 7.3 6.4

## 2024-02-28 NOTE — ED PROVIDER NOTES
[facility] EMERGENCY DEPT  EMERGENCY DEPARTMENT HISTORY AND PHYSICAL EXAM      Date: 2/28/2024  Patient Name: Roxanne Hill  MRN: [id number]  Birthdate 9/20/1923  Date of evaluation: 2/28/2024  Provider: Sheryl Conti MD   Note Started: 2:22 PM EST 2/28/24    HISTORY OF PRESENT ILLNESS     Chief Complaint   Patient presents with    Wound Infection       History Provided By: Patient    HPI: Roxanne Hill is a 89 y.o. female patient recently had gangrenous toes been followed by podiatry had a procedure done sent for possible infection she has had no fevers.    PAST MEDICAL HISTORY   Past Medical History:  Past Medical History:   Diagnosis Date    DM (diabetes mellitus) (HCC)     High cholesterol     History of multiple allergies     HTN (hypertension)     Hypertension     Osteoporosis     Type 2 diabetes mellitus (HCC)        Past Surgical History:  Past Surgical History:   Procedure Laterality Date    APPENDECTOMY      HIP ARTHROSCOPY      HYSTERECTOMY (CERVIX STATUS UNKNOWN)      KNEE ARTHROSCOPY      TOTAL HIP ARTHROPLASTY         Family History:  Family History   Problem Relation Age of Onset    Osteoporosis Sister     Hypertension Mother     Diabetes Mother        Social History:  Social History     Tobacco Use    Smoking status: Never    Smokeless tobacco: Never   Substance Use Topics    Alcohol use: Never    Drug use: Never       Allergies:  No Known Allergies    PCP: Muriel Navarro MD    Current Meds:   Current Facility-Administered Medications   Medication Dose Route Frequency Provider Last Rate Last Admin    cefTRIAXone (ROCEPHIN) 1,000 mg in sterile water 10 mL IV syringe  1,000 mg IntraVENous NOW Sheryl Conti MD         Current Outpatient Medications   Medication Sig Dispense Refill    amoxicillin-clavulanate (AUGMENTIN) 875-125 MG per tablet Take 1 tablet by mouth 2 times daily for 10 days 20 tablet 0    aspirin 81 MG EC tablet Take 1 tablet by mouth daily      esomeprazole Magnesium (NEXIUM) 40 MG

## 2024-02-28 NOTE — ED TRIAGE NOTES
Pt arrives via EMS from [facility]. Per EMS, pt has infection in left foot, had surgery for this infection February 16th, sent here for continued infection

## 2024-02-28 NOTE — ED NOTES
Spoke with Gita, Nurse at [facility], told writer that they thought patient was to be admitted for possible amputation and IV antibiotics. Writer verified with MD, no indications for admission. Gita [facility] nurse, made aware and attempting to find patient ride back to facility before writer attempts to schedule cab ride

## 2024-02-29 ENCOUNTER — HOSPITAL ENCOUNTER (INPATIENT)
Facility: HOSPITAL | Age: 89
LOS: 7 days | Discharge: NURSING, REHAB OR LONG-TERM CARE | DRG: 256 | End: 2024-03-07
Attending: EMERGENCY MEDICINE | Admitting: INTERNAL MEDICINE
Payer: MEDICARE

## 2024-02-29 DIAGNOSIS — L08.9 DIABETIC FOOT INFECTION (HCC): Primary | ICD-10-CM

## 2024-02-29 DIAGNOSIS — E11.628 DIABETIC FOOT INFECTION (HCC): Primary | ICD-10-CM

## 2024-02-29 PROBLEM — L03.116 CELLULITIS OF LEFT FOOT: Status: ACTIVE | Noted: 2024-02-29

## 2024-02-29 LAB
ALBUMIN SERPL-MCNC: 3 G/DL (ref 3.5–5)
ALBUMIN/GLOB SERPL: 0.7 (ref 1.1–2.2)
ALP SERPL-CCNC: 80 U/L (ref 45–117)
ALT SERPL-CCNC: 18 U/L (ref 12–78)
ANION GAP SERPL CALC-SCNC: 5 MMOL/L (ref 5–15)
AST SERPL-CCNC: 10 U/L (ref 15–37)
BASOPHILS # BLD: 0.1 K/UL (ref 0–0.1)
BASOPHILS NFR BLD: 1 % (ref 0–1)
BILIRUB SERPL-MCNC: 0.3 MG/DL (ref 0.2–1)
BUN SERPL-MCNC: 25 MG/DL (ref 6–20)
BUN/CREAT SERPL: 26 (ref 12–20)
CALCIUM SERPL-MCNC: 9.2 MG/DL (ref 8.5–10.1)
CHLORIDE SERPL-SCNC: 103 MMOL/L (ref 97–108)
CO2 SERPL-SCNC: 28 MMOL/L (ref 21–32)
COMMENT:: NORMAL
CREAT SERPL-MCNC: 0.96 MG/DL (ref 0.55–1.02)
CRP SERPL-MCNC: 1.3 MG/DL (ref 0–0.3)
DIFFERENTIAL METHOD BLD: ABNORMAL
EOSINOPHIL # BLD: 0.2 K/UL (ref 0–0.4)
EOSINOPHIL NFR BLD: 2 % (ref 0–7)
ERYTHROCYTE [DISTWIDTH] IN BLOOD BY AUTOMATED COUNT: 13.4 % (ref 11.5–14.5)
ERYTHROCYTE [SEDIMENTATION RATE] IN BLOOD: 69 MM/HR (ref 0–30)
GLOBULIN SER CALC-MCNC: 4.4 G/DL (ref 2–4)
GLUCOSE BLD STRIP.AUTO-MCNC: 165 MG/DL (ref 65–117)
GLUCOSE BLD STRIP.AUTO-MCNC: 191 MG/DL (ref 65–117)
GLUCOSE BLD STRIP.AUTO-MCNC: 362 MG/DL (ref 65–117)
GLUCOSE SERPL-MCNC: 307 MG/DL (ref 65–100)
HCT VFR BLD AUTO: 38.4 % (ref 35–47)
HGB BLD-MCNC: 11.8 G/DL (ref 11.5–16)
IMM GRANULOCYTES # BLD AUTO: 0 K/UL (ref 0–0.04)
IMM GRANULOCYTES NFR BLD AUTO: 0 % (ref 0–0.5)
INR PPP: 1 (ref 0.9–1.1)
LYMPHOCYTES # BLD: 0.9 K/UL (ref 0.8–3.5)
LYMPHOCYTES NFR BLD: 11 % (ref 12–49)
MCH RBC QN AUTO: 27.4 PG (ref 26–34)
MCHC RBC AUTO-ENTMCNC: 30.7 G/DL (ref 30–36.5)
MCV RBC AUTO: 89.1 FL (ref 80–99)
MONOCYTES # BLD: 0.6 K/UL (ref 0–1)
MONOCYTES NFR BLD: 8 % (ref 5–13)
NEUTS SEG # BLD: 5.9 K/UL (ref 1.8–8)
NEUTS SEG NFR BLD: 78 % (ref 32–75)
NRBC # BLD: 0 K/UL (ref 0–0.01)
NRBC BLD-RTO: 0 PER 100 WBC
PLATELET # BLD AUTO: 244 K/UL (ref 150–400)
PMV BLD AUTO: 10.4 FL (ref 8.9–12.9)
POTASSIUM SERPL-SCNC: 3.9 MMOL/L (ref 3.5–5.1)
PROT SERPL-MCNC: 7.4 G/DL (ref 6.4–8.2)
PROTHROMBIN TIME: 10.6 SEC (ref 9–11.1)
RBC # BLD AUTO: 4.31 M/UL (ref 3.8–5.2)
SERVICE CMNT-IMP: ABNORMAL
SODIUM SERPL-SCNC: 136 MMOL/L (ref 136–145)
SPECIMEN HOLD: NORMAL
WBC # BLD AUTO: 7.6 K/UL (ref 3.6–11)

## 2024-02-29 PROCEDURE — 1100000000 HC RM PRIVATE

## 2024-02-29 PROCEDURE — 85610 PROTHROMBIN TIME: CPT

## 2024-02-29 PROCEDURE — 85652 RBC SED RATE AUTOMATED: CPT

## 2024-02-29 PROCEDURE — 85025 COMPLETE CBC W/AUTO DIFF WBC: CPT

## 2024-02-29 PROCEDURE — 6360000002 HC RX W HCPCS: Performed by: INTERNAL MEDICINE

## 2024-02-29 PROCEDURE — 87040 BLOOD CULTURE FOR BACTERIA: CPT

## 2024-02-29 PROCEDURE — 6370000000 HC RX 637 (ALT 250 FOR IP): Performed by: NURSE PRACTITIONER

## 2024-02-29 PROCEDURE — 80053 COMPREHEN METABOLIC PANEL: CPT

## 2024-02-29 PROCEDURE — 2580000003 HC RX 258: Performed by: EMERGENCY MEDICINE

## 2024-02-29 PROCEDURE — 86140 C-REACTIVE PROTEIN: CPT

## 2024-02-29 PROCEDURE — 36415 COLL VENOUS BLD VENIPUNCTURE: CPT

## 2024-02-29 PROCEDURE — 99285 EMERGENCY DEPT VISIT HI MDM: CPT

## 2024-02-29 PROCEDURE — 96365 THER/PROPH/DIAG IV INF INIT: CPT

## 2024-02-29 PROCEDURE — 2580000003 HC RX 258: Performed by: INTERNAL MEDICINE

## 2024-02-29 PROCEDURE — 2580000003 HC RX 258: Performed by: NURSE PRACTITIONER

## 2024-02-29 PROCEDURE — 96367 TX/PROPH/DG ADDL SEQ IV INF: CPT

## 2024-02-29 PROCEDURE — 6360000002 HC RX W HCPCS: Performed by: EMERGENCY MEDICINE

## 2024-02-29 PROCEDURE — 82962 GLUCOSE BLOOD TEST: CPT

## 2024-02-29 RX ORDER — SODIUM CHLORIDE 0.9 % (FLUSH) 0.9 %
5-40 SYRINGE (ML) INJECTION EVERY 12 HOURS SCHEDULED
Status: DISCONTINUED | OUTPATIENT
Start: 2024-02-29 | End: 2024-03-07 | Stop reason: HOSPADM

## 2024-02-29 RX ORDER — ONDANSETRON 2 MG/ML
4 INJECTION INTRAMUSCULAR; INTRAVENOUS EVERY 6 HOURS PRN
Status: DISCONTINUED | OUTPATIENT
Start: 2024-02-29 | End: 2024-03-07 | Stop reason: HOSPADM

## 2024-02-29 RX ORDER — ASPIRIN 81 MG/1
81 TABLET ORAL DAILY
Status: DISCONTINUED | OUTPATIENT
Start: 2024-02-29 | End: 2024-03-07 | Stop reason: HOSPADM

## 2024-02-29 RX ORDER — INSULIN LISPRO 100 [IU]/ML
0-8 INJECTION, SOLUTION INTRAVENOUS; SUBCUTANEOUS
Status: DISCONTINUED | OUTPATIENT
Start: 2024-02-29 | End: 2024-03-07 | Stop reason: HOSPADM

## 2024-02-29 RX ORDER — ACETAMINOPHEN 325 MG/1
650 TABLET ORAL EVERY 6 HOURS PRN
Status: DISCONTINUED | OUTPATIENT
Start: 2024-02-29 | End: 2024-03-01 | Stop reason: SDUPTHER

## 2024-02-29 RX ORDER — ONDANSETRON 4 MG/1
4 TABLET, ORALLY DISINTEGRATING ORAL EVERY 8 HOURS PRN
Status: DISCONTINUED | OUTPATIENT
Start: 2024-02-29 | End: 2024-03-07 | Stop reason: HOSPADM

## 2024-02-29 RX ORDER — LOSARTAN POTASSIUM 25 MG/1
50 TABLET ORAL DAILY
Status: DISCONTINUED | OUTPATIENT
Start: 2024-02-29 | End: 2024-03-07 | Stop reason: HOSPADM

## 2024-02-29 RX ORDER — POLYETHYLENE GLYCOL 3350 17 G/17G
17 POWDER, FOR SOLUTION ORAL DAILY PRN
Status: DISCONTINUED | OUTPATIENT
Start: 2024-02-29 | End: 2024-03-04

## 2024-02-29 RX ORDER — ACETAMINOPHEN 650 MG/1
650 SUPPOSITORY RECTAL EVERY 6 HOURS PRN
Status: DISCONTINUED | OUTPATIENT
Start: 2024-02-29 | End: 2024-03-07 | Stop reason: HOSPADM

## 2024-02-29 RX ORDER — ACETAMINOPHEN 325 MG/1
650 TABLET ORAL EVERY 6 HOURS PRN
Status: DISCONTINUED | OUTPATIENT
Start: 2024-02-29 | End: 2024-03-07 | Stop reason: HOSPADM

## 2024-02-29 RX ORDER — ALOGLIPTIN 6.25 MG/1
6.25 TABLET, FILM COATED ORAL DAILY
Status: DISCONTINUED | OUTPATIENT
Start: 2024-02-29 | End: 2024-03-07 | Stop reason: HOSPADM

## 2024-02-29 RX ORDER — INSULIN LISPRO 100 [IU]/ML
0-4 INJECTION, SOLUTION INTRAVENOUS; SUBCUTANEOUS NIGHTLY
Status: DISCONTINUED | OUTPATIENT
Start: 2024-02-29 | End: 2024-03-07 | Stop reason: HOSPADM

## 2024-02-29 RX ORDER — SODIUM CHLORIDE 0.9 % (FLUSH) 0.9 %
5-40 SYRINGE (ML) INJECTION PRN
Status: DISCONTINUED | OUTPATIENT
Start: 2024-02-29 | End: 2024-03-07 | Stop reason: HOSPADM

## 2024-02-29 RX ORDER — PREGABALIN 25 MG/1
50 CAPSULE ORAL 2 TIMES DAILY
Status: DISCONTINUED | OUTPATIENT
Start: 2024-02-29 | End: 2024-03-07 | Stop reason: HOSPADM

## 2024-02-29 RX ORDER — SODIUM CHLORIDE 9 MG/ML
INJECTION, SOLUTION INTRAVENOUS PRN
Status: DISCONTINUED | OUTPATIENT
Start: 2024-02-29 | End: 2024-03-07 | Stop reason: HOSPADM

## 2024-02-29 RX ORDER — AMLODIPINE BESYLATE 5 MG/1
10 TABLET ORAL DAILY
Status: DISCONTINUED | OUTPATIENT
Start: 2024-02-29 | End: 2024-03-07 | Stop reason: HOSPADM

## 2024-02-29 RX ADMIN — ALOGLIPTIN 6.25 MG: 6.25 TABLET, FILM COATED ORAL at 17:00

## 2024-02-29 RX ADMIN — INSULIN LISPRO 4 UNITS: 100 INJECTION, SOLUTION INTRAVENOUS; SUBCUTANEOUS at 21:51

## 2024-02-29 RX ADMIN — SODIUM CHLORIDE, PRESERVATIVE FREE 10 ML: 5 INJECTION INTRAVENOUS at 21:51

## 2024-02-29 RX ADMIN — VANCOMYCIN HYDROCHLORIDE 1500 MG: 1.5 INJECTION, POWDER, LYOPHILIZED, FOR SOLUTION INTRAVENOUS at 11:15

## 2024-02-29 RX ADMIN — CEFEPIME 1000 MG: 1 INJECTION, POWDER, FOR SOLUTION INTRAMUSCULAR; INTRAVENOUS at 13:56

## 2024-02-29 RX ADMIN — ASPIRIN 81 MG: 81 TABLET, COATED ORAL at 17:00

## 2024-02-29 RX ADMIN — AMPICILLIN AND SULBACTAM 3000 MG: 2; 1 INJECTION, POWDER, FOR SOLUTION INTRAVENOUS at 10:52

## 2024-02-29 RX ADMIN — PREGABALIN 50 MG: 25 CAPSULE ORAL at 21:51

## 2024-02-29 ASSESSMENT — LIFESTYLE VARIABLES
HOW OFTEN DO YOU HAVE A DRINK CONTAINING ALCOHOL: NEVER
HOW MANY STANDARD DRINKS CONTAINING ALCOHOL DO YOU HAVE ON A TYPICAL DAY: PATIENT DOES NOT DRINK

## 2024-02-29 ASSESSMENT — PAIN DESCRIPTION - ORIENTATION: ORIENTATION: LEFT

## 2024-02-29 ASSESSMENT — PAIN DESCRIPTION - DESCRIPTORS: DESCRIPTORS: SORE

## 2024-02-29 ASSESSMENT — PAIN - FUNCTIONAL ASSESSMENT
PAIN_FUNCTIONAL_ASSESSMENT: PREVENTS OR INTERFERES SOME ACTIVE ACTIVITIES AND ADLS
PAIN_FUNCTIONAL_ASSESSMENT: 0-10

## 2024-02-29 ASSESSMENT — PAIN SCALES - GENERAL
PAINLEVEL_OUTOF10: 3
PAINLEVEL_OUTOF10: 0

## 2024-02-29 ASSESSMENT — PAIN DESCRIPTION - LOCATION: LOCATION: FOOT

## 2024-02-29 NOTE — PROGRESS NOTES
Pharmacy Antimicrobial Kinetic Dosing    Indication for Antimicrobials: SSTI (LLE cellulitis/gangrene of left great toe/possible osteomyelitis)    Current Regimen of Each Antimicrobial:  Vancomycin Pharmacy to Dose; Start Date ; Day # 1  Cefepime 1g IV q12h; Start Date ; Day # 1    Previous Antimicrobial Therapy:  -Unasyn    -Ctx       Goal Level: Vancomycin -600    Date Dose & Interval Measured (mcg/mL) Predicted AUC                       Significant Cultures:   N/A    Labs:  Recent Labs     Units 24  1030 24  1252   CREATININE MG/DL 0.96 0.84   BUN MG/DL 25* 23*   PROCAL ng/mL  --  0.08*   WBC K/uL 7.6 6.7     Temp (24hrs), Av.2 °F (36.8 °C), Min:98.2 °F (36.8 °C), Max:98.2 °F (36.8 °C)    Conditions for Dosing Consideration: None    Creatinine Clearance (mL/min): Estimated Creatinine Clearance: 27 mL/min (based on SCr of 0.96 mg/dL).     Impression/Plan:   SCr stable at baseline - will continue with AUC kinetic dosing  Vancomycin 1500mg IV loading dose ordered  Ordered vancomycin 500mg IV q18h for a projected   BMP ordered daily   Antimicrobial stop date 5 days     Pharmacy will follow daily and adjust medications as appropriate for renal function and/or serum levels.    Thank you,  TESSA LIMON [facility]

## 2024-02-29 NOTE — H&P
Sheryl Conti MD   aspirin 81 MG EC tablet Take 1 tablet by mouth daily    Dayron Mitchell MD   esomeprazole Magnesium (NEXIUM) 40 MG PACK Take 1 packet by mouth daily    ProviderDayron MD   LANTUS SOLOSTAR 100 UNIT/ML injection pen 7 units nightly 5/5/23   Dayron Mitchell MD   B-ARBAHAN UF III MINI PEN NEEDLES 31G X 5 MM MISC  3/14/23   ProviderDayron MD   Calcium-Vitamin D (CALTRATE 600 PLUS-VIT D PO) Caltrate 600 plus D    ProviderDayron MD   saline nasal gel (AYR) GEL by Nasal route as needed for Congestion    ProviderDayron MD   sodium chloride (OCEAN, BABY AYR) 0.65 % nasal spray 1 spray by Nasal route as needed for Congestion    ProviderDayron MD   acetaminophen (TYLENOL) 500 MG tablet Take 1 tablet by mouth 3 times daily    Automatic Reconciliation, Ar   amLODIPine (NORVASC) 10 MG tablet Take 1 tablet by mouth daily    Automatic Reconciliation, Ar   calcium carb-cholecalciferol 600-10 MG-MCG TABS per tab Take 1 tablet by mouth daily  Patient not taking: Reported on 6/14/2023    Automatic Reconciliation, Ar   diclofenac sodium (VOLTAREN) 1 % GEL Apply topically as needed    Automatic Reconciliation, Ar   estradiol (ESTRACE) 0.1 MG/GM vaginal cream Place 2 g vaginally    Automatic Reconciliation, Ar   ferrous sulfate (IRON 325) 325 (65 Fe) MG tablet Take 1 tablet by mouth every morning (before breakfast)  Patient not taking: Reported on 6/14/2023    Automatic Reconciliation, Ar   losartan (COZAAR) 50 MG tablet Take 1 tablet by mouth daily    Automatic Reconciliation, Ar   metFORMIN (GLUCOPHAGE) 500 MG tablet Take 1 tablet by mouth 2 times daily (with meals)  Patient not taking: Reported on 6/14/2023    Automatic Reconciliation, Ar   pregabalin (LYRICA) 50 MG capsule Take 1 capsule by mouth 2 times daily.    Automatic Reconciliation, Ar   SITagliptin (JANUVIA) 50 MG tablet Take 1 tablet by mouth daily    Automatic Reconciliation, Ar         Objective:   VITALS:   disease in the distal left SFA. Severe stenosis of the distal left SFA. Popliteal artery is patent. Anterior tibial, posterior tibial, and peroneal arteries demonstrate proximal moderate to severe stenosis, with the left posterior tibial artery occluded in the lower leg and the dorsalis pedis artery patent at the ankle. LIVER: No arterially enhancing mass. BILIARY TREE: Gallbladder is within normal limits. CBD is not dilated. SPLEEN: within normal limits. PANCREAS: No arterially enhancing mass, inflammation, or ductal dilatation. ADRENALS: Unremarkable. KIDNEYS: Left parapelvic renal cysts. Nonobstructive left lower pole renal stone. No hydronephrosis. STOMACH: Moderate sliding hiatal hernia. SMALL BOWEL: No dilatation or wall thickening. COLON: No dilatation or wall thickening. APPENDIX: Not seen PERITONEUM: No ascites or pneumoperitoneum. RETROPERITONEUM: No lymphadenopathy or hemorrhage. REPRODUCTIVE ORGANS: Vaginal pessary in place. URINARY BLADDER: No mass or calculus. BONES: Bilateral hip arthroplasties. Right knee arthroplasty. Circumferential edema of the left calf and ankle. ABDOMINAL WALL: No mass or hernia. ADDITIONAL COMMENTS: N/A     1.  One-vessel runoff to the left foot via the anterior tibial artery. Moderate segmental disease in the proximal left anterior tibial artery. 2.  Long segment occlusion of the left posterior tibial artery. 3.  Moderate to severe stenosis of the distal superficial femoral arteries bilaterally. 4.  No significant aortoiliac disease.      _______________________________________________________________________    TOTAL TIME:  76 Minutes    Critical Care Provided     Minutes non procedure based    Signed: RAJENDRA Barron NP    Procedures: see electronic medical records for all procedures/Xrays and details which were not copied into this note but were reviewed prior to creation of Plan.

## 2024-02-29 NOTE — ED PROVIDER NOTES
Tobacco Use    Smoking status: Never    Smokeless tobacco: Never   Substance Use Topics    Alcohol use: Never    Drug use: Never       Allergies:  No Known Allergies    CURRENT MEDICATIONS      Previous Medications    ACETAMINOPHEN (TYLENOL) 500 MG TABLET    Take 1 tablet by mouth 3 times daily    AMLODIPINE (NORVASC) 10 MG TABLET    Take 1 tablet by mouth daily    AMOXICILLIN-CLAVULANATE (AUGMENTIN) 875-125 MG PER TABLET    Take 1 tablet by mouth 2 times daily for 10 days    ASPIRIN 81 MG EC TABLET    Take 1 tablet by mouth daily    B-D UF III MINI PEN NEEDLES 31G X 5 MM MISC        CALCIUM CARB-CHOLECALCIFEROL 600-10 MG-MCG TABS PER TAB    Take 1 tablet by mouth daily    CALCIUM-VITAMIN D (CALTRATE 600 PLUS-VIT D PO)    Caltrate 600 plus D    DICLOFENAC SODIUM (VOLTAREN) 1 % GEL    Apply topically as needed    ESOMEPRAZOLE MAGNESIUM (NEXIUM) 40 MG PACK    Take 1 packet by mouth daily    ESTRADIOL (ESTRACE) 0.1 MG/GM VAGINAL CREAM    Place 2 g vaginally    FERROUS SULFATE (IRON 325) 325 (65 FE) MG TABLET    Take 1 tablet by mouth every morning (before breakfast)    LANTUS SOLOSTAR 100 UNIT/ML INJECTION PEN    7 units nightly    LOSARTAN (COZAAR) 50 MG TABLET    Take 1 tablet by mouth daily    METFORMIN (GLUCOPHAGE) 500 MG TABLET    Take 1 tablet by mouth 2 times daily (with meals)    PREGABALIN (LYRICA) 50 MG CAPSULE    Take 1 capsule by mouth 2 times daily.    SALINE NASAL GEL (AYR) GEL    by Nasal route as needed for Congestion    SITAGLIPTIN (JANUVIA) 50 MG TABLET    Take 1 tablet by mouth daily    SODIUM CHLORIDE (OCEAN, BABY AYR) 0.65 % NASAL SPRAY    1 spray by Nasal route as needed for Congestion       SCREENINGS               No data recorded         PHYSICAL EXAM      ED Triage Vitals [02/29/24 1007]   Enc Vitals Group      BP (!) 142/76      Pulse 79      Respirations 18      Temp 98.2 °F (36.8 °C)      Temp Source Oral      SpO2 95 %      Weight - Scale 60.4 kg (133 lb 2.5 oz)      Height 1.626 m (5'  chloride 0.9 % 100 mL IVPB (mini-bag) (0 mg IntraVENous Stopped 2/29/24 1115)   vancomycin (VANCOCIN) 1,500 mg in sodium chloride 0.9 % 250 mL IVPB ([id number]) (0 mg IntraVENous Stopped 2/29/24 1257)       Medical Decision Making  Patient not toxic, no SIRS, no signs of sepsis on physical exam.  She appears well and nontoxic.  Will defer to vascular regarding antibiotics and further imaging.  Will consult vascular now.  Patient has known disease and is followed by vascular.    Amount and/or Complexity of Data Reviewed  External Data Reviewed: radiology and notes.     Details: Imaging reviewed CTA of the abdomen and pelvis with runoff from 1/30/2024 showed one-vessel runoff to the left foot and the anterior tibial artery.  Moderate segmental disease in the proximal left anterior tibial artery and a long segment total occlusion of the left posterior tibial artery and moderate to severe stenosis of the distal superficial femoral arteries bilaterally  Labs: ordered.    Risk  OTC drugs.  Decision regarding hospitalization.      1:18 PM  Discussed with Dr. Lindquist. He states patient has been revascularized. She has been on Augmentin without clinical improvement. Plan for admission with medical service for IV abx    While patient does have cellulitis and diabetic foot infection.  Antibiotic was were administered here, blood cultures not obtained secondary to the fact that the patient has been on oral antibiotics and had IV antibiotics yesterday, she has no SIRS criteria.           FINAL IMPRESSION     1. Diabetic foot infection (HCC)          DISPOSITION/PLAN   Roxanne Hill's  results have been reviewed with her.  She has been counseled regarding her diagnosis, treatment, and plan.  She verbally conveys understanding and agreement of the signs, symptoms, diagnosis, treatment and prognosis and additionally agrees to follow up as discussed.  She also agrees with the care-plan and conveys that all of her questions have been

## 2024-02-29 NOTE — ED NOTES
While charting with the patient in the room she started to fall asleep and started to desat, Applied 2lpm of oxygen and informed MD Gill

## 2024-02-29 NOTE — CONSULTS
Received consult will see today   Most likely needs and partial amp of the left first toe   Will plan after discussing with family and vascular

## 2024-02-29 NOTE — PROGRESS NOTES
Seeing patient in the room complaints of left big toe pain  Distal necrotic, Gangrenous ulceration most likely down to the bone, subtle changes on the x-ray, which was reviewed by myself representing Osteo   Educated patient of etiology and advised, distal amputation at the IPJ   Verbally consented patient would like to have surgery done on Saturday   Patient will be NPO on Saturday  Please hold anticoagulants

## 2024-02-29 NOTE — CONSULTS
Vascular Surgery Consult Note  2/29/2024    Subjective:     Roxanne Hill is a very pleasant 89 y.o. female with past medical history significant for diabetes mellitus, hypertension, and hyperlipidemia.  She is a non-smoker.  She is a known patient of the practice for peripheral arterial disease with ulceration of the left great toe.  She is status post left lower extremity revascularization with atherectomy, balloon angioplasty, and stenting of the left SFA/popliteal/tibial arteries on February 16, 2024.  She has a palpable DP pulse.  Her post reperfusion studies show adequate blood flow.  She has been followed closely in the outpatient setting and was seen in the clinic yesterday with progressive gangrene of the left great toe that was refractory to oral Augmentin.  She was instructed to present to [facility] for admission for IV antibiotics.  She did as instructed and received IV Rocephin in the emergency room and was discharged back to her primary living facility at [facility].    She now presents to [facility] with persistent gangrene with associated erythema, pain, and edema.  We have been asked to evaluate.    Past medical history  Peripheral arterial disease  Diabetes mellitus  Hypertension  Hyperlipidemia  Osteoporosis    Past surgical history  Appendectomy  Total hip arthroplasty  Knee arthroscopy  Hysterectomy    Family history  Hypertension: Mother  Diabetes: Mother    Social history  She has never smoked.  She denies alcohol use.    Home medications  Prior to Admission medications    Medication Sig Start Date End Date Taking? Authorizing Provider   amoxicillin-clavulanate (AUGMENTIN) 875-125 MG per tablet Take 1 tablet by mouth 2 times daily for 10 days 2/28/24 3/9/24  Sheryl Conti MD   aspirin 81 MG EC tablet Take 1 tablet by mouth daily    Provider, MD Dayron   esomeprazole Magnesium (NEXIUM) 40 MG PACK Take 1 packet by mouth daily    Provider,

## 2024-03-01 LAB
ANION GAP SERPL CALC-SCNC: 3 MMOL/L (ref 5–15)
BASOPHILS # BLD: 0 K/UL (ref 0–0.1)
BASOPHILS NFR BLD: 1 % (ref 0–1)
BUN SERPL-MCNC: 22 MG/DL (ref 6–20)
BUN/CREAT SERPL: 28 (ref 12–20)
CALCIUM SERPL-MCNC: 8.7 MG/DL (ref 8.5–10.1)
CHLORIDE SERPL-SCNC: 106 MMOL/L (ref 97–108)
CO2 SERPL-SCNC: 26 MMOL/L (ref 21–32)
CREAT SERPL-MCNC: 0.8 MG/DL (ref 0.55–1.02)
DIFFERENTIAL METHOD BLD: ABNORMAL
EOSINOPHIL # BLD: 0.3 K/UL (ref 0–0.4)
EOSINOPHIL NFR BLD: 5 % (ref 0–7)
ERYTHROCYTE [DISTWIDTH] IN BLOOD BY AUTOMATED COUNT: 13.4 % (ref 11.5–14.5)
EST. AVERAGE GLUCOSE BLD GHB EST-MCNC: 206 MG/DL
GLUCOSE BLD STRIP.AUTO-MCNC: 151 MG/DL (ref 65–117)
GLUCOSE BLD STRIP.AUTO-MCNC: 167 MG/DL (ref 65–117)
GLUCOSE BLD STRIP.AUTO-MCNC: 239 MG/DL (ref 65–117)
GLUCOSE BLD STRIP.AUTO-MCNC: 282 MG/DL (ref 65–117)
GLUCOSE SERPL-MCNC: 129 MG/DL (ref 65–100)
HBA1C MFR BLD: 8.8 % (ref 4–5.6)
HCT VFR BLD AUTO: 34.7 % (ref 35–47)
HGB BLD-MCNC: 11 G/DL (ref 11.5–16)
IMM GRANULOCYTES # BLD AUTO: 0 K/UL (ref 0–0.04)
IMM GRANULOCYTES NFR BLD AUTO: 0 % (ref 0–0.5)
LACTATE SERPL-SCNC: 0.9 MMOL/L (ref 0.4–2)
LYMPHOCYTES # BLD: 1 K/UL (ref 0.8–3.5)
LYMPHOCYTES NFR BLD: 18 % (ref 12–49)
MCH RBC QN AUTO: 28.4 PG (ref 26–34)
MCHC RBC AUTO-ENTMCNC: 31.7 G/DL (ref 30–36.5)
MCV RBC AUTO: 89.4 FL (ref 80–99)
MONOCYTES # BLD: 0.6 K/UL (ref 0–1)
MONOCYTES NFR BLD: 10 % (ref 5–13)
NEUTS SEG # BLD: 3.9 K/UL (ref 1.8–8)
NEUTS SEG NFR BLD: 66 % (ref 32–75)
NRBC # BLD: 0 K/UL (ref 0–0.01)
NRBC BLD-RTO: 0 PER 100 WBC
PLATELET # BLD AUTO: 211 K/UL (ref 150–400)
PMV BLD AUTO: 10.3 FL (ref 8.9–12.9)
POTASSIUM SERPL-SCNC: 3.9 MMOL/L (ref 3.5–5.1)
PROCALCITONIN SERPL-MCNC: 0.08 NG/ML
RBC # BLD AUTO: 3.88 M/UL (ref 3.8–5.2)
SERVICE CMNT-IMP: ABNORMAL
SODIUM SERPL-SCNC: 135 MMOL/L (ref 136–145)
WBC # BLD AUTO: 5.9 K/UL (ref 3.6–11)

## 2024-03-01 PROCEDURE — 6370000000 HC RX 637 (ALT 250 FOR IP): Performed by: NURSE PRACTITIONER

## 2024-03-01 PROCEDURE — 2580000003 HC RX 258: Performed by: NURSE PRACTITIONER

## 2024-03-01 PROCEDURE — 1100000000 HC RM PRIVATE

## 2024-03-01 PROCEDURE — 2580000003 HC RX 258: Performed by: STUDENT IN AN ORGANIZED HEALTH CARE EDUCATION/TRAINING PROGRAM

## 2024-03-01 PROCEDURE — 85025 COMPLETE CBC W/AUTO DIFF WBC: CPT

## 2024-03-01 PROCEDURE — 6360000002 HC RX W HCPCS: Performed by: INTERNAL MEDICINE

## 2024-03-01 PROCEDURE — 84145 PROCALCITONIN (PCT): CPT

## 2024-03-01 PROCEDURE — 6360000002 HC RX W HCPCS: Performed by: STUDENT IN AN ORGANIZED HEALTH CARE EDUCATION/TRAINING PROGRAM

## 2024-03-01 PROCEDURE — 80048 BASIC METABOLIC PNL TOTAL CA: CPT

## 2024-03-01 PROCEDURE — 83036 HEMOGLOBIN GLYCOSYLATED A1C: CPT

## 2024-03-01 PROCEDURE — 36415 COLL VENOUS BLD VENIPUNCTURE: CPT

## 2024-03-01 PROCEDURE — 6370000000 HC RX 637 (ALT 250 FOR IP): Performed by: INTERNAL MEDICINE

## 2024-03-01 PROCEDURE — 2580000003 HC RX 258: Performed by: INTERNAL MEDICINE

## 2024-03-01 PROCEDURE — 82962 GLUCOSE BLOOD TEST: CPT

## 2024-03-01 PROCEDURE — 83605 ASSAY OF LACTIC ACID: CPT

## 2024-03-01 RX ADMIN — SODIUM CHLORIDE, PRESERVATIVE FREE 10 ML: 5 INJECTION INTRAVENOUS at 21:07

## 2024-03-01 RX ADMIN — SODIUM CHLORIDE: 9 INJECTION, SOLUTION INTRAVENOUS at 22:17

## 2024-03-01 RX ADMIN — Medication 1 TABLET: at 08:36

## 2024-03-01 RX ADMIN — VANCOMYCIN HYDROCHLORIDE 500 MG: 500 INJECTION, POWDER, LYOPHILIZED, FOR SOLUTION INTRAVENOUS at 22:19

## 2024-03-01 RX ADMIN — PREGABALIN 50 MG: 25 CAPSULE ORAL at 08:36

## 2024-03-01 RX ADMIN — ALOGLIPTIN 6.25 MG: 6.25 TABLET, FILM COATED ORAL at 08:36

## 2024-03-01 RX ADMIN — LOSARTAN POTASSIUM 50 MG: 25 TABLET, FILM COATED ORAL at 08:36

## 2024-03-01 RX ADMIN — INSULIN LISPRO 2 UNITS: 100 INJECTION, SOLUTION INTRAVENOUS; SUBCUTANEOUS at 12:19

## 2024-03-01 RX ADMIN — AMLODIPINE BESYLATE 10 MG: 5 TABLET ORAL at 08:36

## 2024-03-01 RX ADMIN — CEFEPIME 1000 MG: 1 INJECTION, POWDER, FOR SOLUTION INTRAMUSCULAR; INTRAVENOUS at 02:56

## 2024-03-01 RX ADMIN — PREGABALIN 50 MG: 25 CAPSULE ORAL at 21:04

## 2024-03-01 RX ADMIN — VANCOMYCIN HYDROCHLORIDE 500 MG: 500 INJECTION, POWDER, LYOPHILIZED, FOR SOLUTION INTRAVENOUS at 08:35

## 2024-03-01 RX ADMIN — ASPIRIN 81 MG: 81 TABLET, COATED ORAL at 08:36

## 2024-03-01 RX ADMIN — CEFEPIME 2000 MG: 2 INJECTION, POWDER, FOR SOLUTION INTRAVENOUS at 12:32

## 2024-03-01 RX ADMIN — SODIUM CHLORIDE, PRESERVATIVE FREE 10 ML: 5 INJECTION INTRAVENOUS at 08:39

## 2024-03-01 ASSESSMENT — PAIN SCALES - GENERAL
PAINLEVEL_OUTOF10: 0

## 2024-03-01 NOTE — PROGRESS NOTES
this patient on 3/1/2024, as outlined below:  PHYSICAL EXAM:  General: Alert, cooperative  EENT:  EOMI. Anicteric sclerae.  Resp:  CTA bilaterally, no wheezing or rales.  No accessory muscle use  CV:  Regular  rhythm,  No edema  GI:  Soft, Non distended, Non tender.  +Bowel sounds  Neurologic:  Alert and oriented X 3, normal speech,   Psych:   Good insight. Not anxious nor agitated  Skin:  No rashes.  No jaundice    Reviewed most current lab test results and cultures  YES  Reviewed most current radiology test results   YES  Review and summation of old records today    NO  Reviewed patient's current orders and MAR    YES  PMH/SH reviewed - no change compared to H&P    Procedures: see electronic medical records for all procedures/Xrays and details which were not copied into this note but were reviewed prior to creation of Plan.      LABS:  I reviewed today's most current labs and imaging studies.  Pertinent labs include:  Recent Labs     02/28/24  1252 02/29/24  1030 03/01/24  0251   WBC 6.7 7.6 5.9   HGB 12.3 11.8 11.0*   HCT 37.8 38.4 34.7*    244 211     Recent Labs     02/28/24  1252 02/29/24  1030 03/01/24  0251    136 135*   K 3.9 3.9 3.9    103 106   CO2 30 28 26   GLUCOSE 187* 307* 129*   BUN 23* 25* 22*   CREATININE 0.84 0.96 0.80   CALCIUM 9.1 9.2 8.7   LABALBU 3.0* 3.0*  --    BILITOT 0.3 0.3  --    AST 13* 10*  --    ALT 18 18  --    INR  --  1.0  --        Signed: RAJENDRA Barron NP

## 2024-03-01 NOTE — PLAN OF CARE
Problem: Discharge Planning  Goal: Discharge to home or other facility with appropriate resources  3/1/2024 0229 by Adrian Lewis RN  Outcome: Progressing  2/29/2024 1722 by Veronica Wellington RN  Outcome: Progressing     Problem: Pain  Goal: Verbalizes/displays adequate comfort level or baseline comfort level  3/1/2024 0229 by Adrian Lewis RN  Outcome: Progressing  2/29/2024 1722 by Veronica Wellington RN  Outcome: Progressing     Problem: Safety - Adult  Goal: Free from fall injury  Outcome: Progressing

## 2024-03-02 ENCOUNTER — ANESTHESIA EVENT (OUTPATIENT)
Facility: HOSPITAL | Age: 89
End: 2024-03-02
Payer: MEDICARE

## 2024-03-02 ENCOUNTER — ANESTHESIA (OUTPATIENT)
Facility: HOSPITAL | Age: 89
End: 2024-03-02
Payer: MEDICARE

## 2024-03-02 LAB
ANION GAP SERPL CALC-SCNC: 3 MMOL/L (ref 5–15)
BASOPHILS # BLD: 0 K/UL (ref 0–0.1)
BASOPHILS NFR BLD: 1 % (ref 0–1)
BUN SERPL-MCNC: 21 MG/DL (ref 6–20)
BUN/CREAT SERPL: 27 (ref 12–20)
CALCIUM SERPL-MCNC: 8.4 MG/DL (ref 8.5–10.1)
CHLORIDE SERPL-SCNC: 108 MMOL/L (ref 97–108)
CO2 SERPL-SCNC: 26 MMOL/L (ref 21–32)
CREAT SERPL-MCNC: 0.79 MG/DL (ref 0.55–1.02)
DIFFERENTIAL METHOD BLD: ABNORMAL
EOSINOPHIL # BLD: 0.2 K/UL (ref 0–0.4)
EOSINOPHIL NFR BLD: 4 % (ref 0–7)
ERYTHROCYTE [DISTWIDTH] IN BLOOD BY AUTOMATED COUNT: 13.6 % (ref 11.5–14.5)
GLUCOSE BLD STRIP.AUTO-MCNC: 199 MG/DL (ref 65–117)
GLUCOSE BLD STRIP.AUTO-MCNC: 199 MG/DL (ref 65–117)
GLUCOSE BLD STRIP.AUTO-MCNC: 209 MG/DL (ref 65–117)
GLUCOSE BLD STRIP.AUTO-MCNC: 390 MG/DL (ref 65–117)
GLUCOSE BLD STRIP.AUTO-MCNC: 450 MG/DL (ref 65–117)
GLUCOSE SERPL-MCNC: 253 MG/DL (ref 65–100)
HCT VFR BLD AUTO: 34.7 % (ref 35–47)
HGB BLD-MCNC: 10.6 G/DL (ref 11.5–16)
IMM GRANULOCYTES # BLD AUTO: 0 K/UL (ref 0–0.04)
IMM GRANULOCYTES NFR BLD AUTO: 1 % (ref 0–0.5)
LYMPHOCYTES # BLD: 1 K/UL (ref 0.8–3.5)
LYMPHOCYTES NFR BLD: 18 % (ref 12–49)
MCH RBC QN AUTO: 27.6 PG (ref 26–34)
MCHC RBC AUTO-ENTMCNC: 30.5 G/DL (ref 30–36.5)
MCV RBC AUTO: 90.4 FL (ref 80–99)
MONOCYTES # BLD: 0.6 K/UL (ref 0–1)
MONOCYTES NFR BLD: 12 % (ref 5–13)
NEUTS SEG # BLD: 3.4 K/UL (ref 1.8–8)
NEUTS SEG NFR BLD: 64 % (ref 32–75)
NRBC # BLD: 0 K/UL (ref 0–0.01)
NRBC BLD-RTO: 0 PER 100 WBC
PLATELET # BLD AUTO: 214 K/UL (ref 150–400)
PMV BLD AUTO: 10.3 FL (ref 8.9–12.9)
POTASSIUM SERPL-SCNC: 3.6 MMOL/L (ref 3.5–5.1)
RBC # BLD AUTO: 3.84 M/UL (ref 3.8–5.2)
SERVICE CMNT-IMP: ABNORMAL
SODIUM SERPL-SCNC: 137 MMOL/L (ref 136–145)
VANCOMYCIN SERPL-MCNC: 11.2 UG/ML
WBC # BLD AUTO: 5.2 K/UL (ref 3.6–11)

## 2024-03-02 PROCEDURE — 2580000003 HC RX 258: Performed by: INTERNAL MEDICINE

## 2024-03-02 PROCEDURE — 3600000002 HC SURGERY LEVEL 2 BASE: Performed by: PODIATRIST

## 2024-03-02 PROCEDURE — 0Y6Q0Z0 DETACHMENT AT LEFT 1ST TOE, COMPLETE, OPEN APPROACH: ICD-10-PCS | Performed by: PODIATRIST

## 2024-03-02 PROCEDURE — 6360000002 HC RX W HCPCS: Performed by: STUDENT IN AN ORGANIZED HEALTH CARE EDUCATION/TRAINING PROGRAM

## 2024-03-02 PROCEDURE — 85025 COMPLETE CBC W/AUTO DIFF WBC: CPT

## 2024-03-02 PROCEDURE — 6370000000 HC RX 637 (ALT 250 FOR IP): Performed by: NURSE PRACTITIONER

## 2024-03-02 PROCEDURE — 7100000000 HC PACU RECOVERY - FIRST 15 MIN: Performed by: PODIATRIST

## 2024-03-02 PROCEDURE — 80202 ASSAY OF VANCOMYCIN: CPT

## 2024-03-02 PROCEDURE — 82962 GLUCOSE BLOOD TEST: CPT

## 2024-03-02 PROCEDURE — 0JBR0ZZ EXCISION OF LEFT FOOT SUBCUTANEOUS TISSUE AND FASCIA, OPEN APPROACH: ICD-10-PCS | Performed by: PODIATRIST

## 2024-03-02 PROCEDURE — 6370000000 HC RX 637 (ALT 250 FOR IP): Performed by: INTERNAL MEDICINE

## 2024-03-02 PROCEDURE — 3600000012 HC SURGERY LEVEL 2 ADDTL 15MIN: Performed by: PODIATRIST

## 2024-03-02 PROCEDURE — 88311 DECALCIFY TISSUE: CPT

## 2024-03-02 PROCEDURE — 36415 COLL VENOUS BLD VENIPUNCTURE: CPT

## 2024-03-02 PROCEDURE — 87070 CULTURE OTHR SPECIMN AEROBIC: CPT

## 2024-03-02 PROCEDURE — 6360000002 HC RX W HCPCS: Performed by: INTERNAL MEDICINE

## 2024-03-02 PROCEDURE — 6360000002 HC RX W HCPCS: Performed by: NURSE ANESTHETIST, CERTIFIED REGISTERED

## 2024-03-02 PROCEDURE — 2500000003 HC RX 250 WO HCPCS: Performed by: NURSE PRACTITIONER

## 2024-03-02 PROCEDURE — 2700000000 HC OXYGEN THERAPY PER DAY

## 2024-03-02 PROCEDURE — 2709999900 HC NON-CHARGEABLE SUPPLY: Performed by: PODIATRIST

## 2024-03-02 PROCEDURE — 80048 BASIC METABOLIC PNL TOTAL CA: CPT

## 2024-03-02 PROCEDURE — 2580000003 HC RX 258: Performed by: STUDENT IN AN ORGANIZED HEALTH CARE EDUCATION/TRAINING PROGRAM

## 2024-03-02 PROCEDURE — 3700000001 HC ADD 15 MINUTES (ANESTHESIA): Performed by: PODIATRIST

## 2024-03-02 PROCEDURE — 7100000001 HC PACU RECOVERY - ADDTL 15 MIN: Performed by: PODIATRIST

## 2024-03-02 PROCEDURE — 6360000002 HC RX W HCPCS: Performed by: PODIATRIST

## 2024-03-02 PROCEDURE — 3700000000 HC ANESTHESIA ATTENDED CARE: Performed by: PODIATRIST

## 2024-03-02 PROCEDURE — 2580000003 HC RX 258: Performed by: NURSE PRACTITIONER

## 2024-03-02 PROCEDURE — 88305 TISSUE EXAM BY PATHOLOGIST: CPT

## 2024-03-02 PROCEDURE — 2580000003 HC RX 258: Performed by: NURSE ANESTHETIST, CERTIFIED REGISTERED

## 2024-03-02 PROCEDURE — 94760 N-INVAS EAR/PLS OXIMETRY 1: CPT

## 2024-03-02 PROCEDURE — 87075 CULTR BACTERIA EXCEPT BLOOD: CPT

## 2024-03-02 PROCEDURE — 87205 SMEAR GRAM STAIN: CPT

## 2024-03-02 PROCEDURE — 1100000000 HC RM PRIVATE

## 2024-03-02 PROCEDURE — 2500000003 HC RX 250 WO HCPCS: Performed by: NURSE ANESTHETIST, CERTIFIED REGISTERED

## 2024-03-02 RX ORDER — SODIUM CHLORIDE 0.9 % (FLUSH) 0.9 %
5-40 SYRINGE (ML) INJECTION EVERY 12 HOURS SCHEDULED
Status: DISCONTINUED | OUTPATIENT
Start: 2024-03-02 | End: 2024-03-02 | Stop reason: HOSPADM

## 2024-03-02 RX ORDER — HYDROMORPHONE HYDROCHLORIDE 1 MG/ML
0.25 INJECTION, SOLUTION INTRAMUSCULAR; INTRAVENOUS; SUBCUTANEOUS EVERY 6 HOURS PRN
Status: DISCONTINUED | OUTPATIENT
Start: 2024-03-02 | End: 2024-03-05

## 2024-03-02 RX ORDER — SODIUM CHLORIDE, SODIUM LACTATE, POTASSIUM CHLORIDE, CALCIUM CHLORIDE 600; 310; 30; 20 MG/100ML; MG/100ML; MG/100ML; MG/100ML
INJECTION, SOLUTION INTRAVENOUS CONTINUOUS PRN
Status: DISCONTINUED | OUTPATIENT
Start: 2024-03-02 | End: 2024-03-02 | Stop reason: SDUPTHER

## 2024-03-02 RX ORDER — PROCHLORPERAZINE EDISYLATE 5 MG/ML
5 INJECTION INTRAMUSCULAR; INTRAVENOUS
Status: DISCONTINUED | OUTPATIENT
Start: 2024-03-02 | End: 2024-03-02 | Stop reason: HOSPADM

## 2024-03-02 RX ORDER — LIDOCAINE HYDROCHLORIDE 20 MG/ML
INJECTION, SOLUTION EPIDURAL; INFILTRATION; INTRACAUDAL; PERINEURAL PRN
Status: DISCONTINUED | OUTPATIENT
Start: 2024-03-02 | End: 2024-03-02 | Stop reason: SDUPTHER

## 2024-03-02 RX ORDER — HYDROMORPHONE HYDROCHLORIDE 1 MG/ML
0.5 INJECTION, SOLUTION INTRAMUSCULAR; INTRAVENOUS; SUBCUTANEOUS EVERY 6 HOURS PRN
Status: DISCONTINUED | OUTPATIENT
Start: 2024-03-02 | End: 2024-03-02

## 2024-03-02 RX ORDER — SODIUM CHLORIDE 0.9 % (FLUSH) 0.9 %
5-40 SYRINGE (ML) INJECTION PRN
Status: DISCONTINUED | OUTPATIENT
Start: 2024-03-02 | End: 2024-03-02 | Stop reason: HOSPADM

## 2024-03-02 RX ORDER — INSULIN LISPRO 100 [IU]/ML
7 INJECTION, SOLUTION INTRAVENOUS; SUBCUTANEOUS ONCE
Status: COMPLETED | OUTPATIENT
Start: 2024-03-02 | End: 2024-03-02

## 2024-03-02 RX ORDER — SODIUM CHLORIDE 9 MG/ML
INJECTION, SOLUTION INTRAVENOUS PRN
Status: DISCONTINUED | OUTPATIENT
Start: 2024-03-02 | End: 2024-03-02 | Stop reason: HOSPADM

## 2024-03-02 RX ORDER — FENTANYL CITRATE 50 UG/ML
50 INJECTION, SOLUTION INTRAMUSCULAR; INTRAVENOUS EVERY 5 MIN PRN
Status: DISCONTINUED | OUTPATIENT
Start: 2024-03-02 | End: 2024-03-02 | Stop reason: HOSPADM

## 2024-03-02 RX ORDER — OXYCODONE HYDROCHLORIDE 5 MG/1
5 TABLET ORAL EVERY 4 HOURS PRN
Status: DISCONTINUED | OUTPATIENT
Start: 2024-03-02 | End: 2024-03-07 | Stop reason: HOSPADM

## 2024-03-02 RX ORDER — FENTANYL CITRATE 50 UG/ML
INJECTION, SOLUTION INTRAMUSCULAR; INTRAVENOUS PRN
Status: DISCONTINUED | OUTPATIENT
Start: 2024-03-02 | End: 2024-03-02 | Stop reason: SDUPTHER

## 2024-03-02 RX ORDER — HYDROMORPHONE HYDROCHLORIDE 1 MG/ML
0.25 INJECTION, SOLUTION INTRAMUSCULAR; INTRAVENOUS; SUBCUTANEOUS EVERY 5 MIN PRN
Status: DISCONTINUED | OUTPATIENT
Start: 2024-03-02 | End: 2024-03-02 | Stop reason: HOSPADM

## 2024-03-02 RX ORDER — DEXAMETHASONE SODIUM PHOSPHATE 4 MG/ML
INJECTION, SOLUTION INTRA-ARTICULAR; INTRALESIONAL; INTRAMUSCULAR; INTRAVENOUS; SOFT TISSUE PRN
Status: DISCONTINUED | OUTPATIENT
Start: 2024-03-02 | End: 2024-03-02 | Stop reason: SDUPTHER

## 2024-03-02 RX ORDER — PROPOFOL 10 MG/ML
INJECTION, EMULSION INTRAVENOUS PRN
Status: DISCONTINUED | OUTPATIENT
Start: 2024-03-02 | End: 2024-03-02 | Stop reason: SDUPTHER

## 2024-03-02 RX ORDER — BUPIVACAINE HYDROCHLORIDE 5 MG/ML
INJECTION, SOLUTION PERINEURAL PRN
Status: DISCONTINUED | OUTPATIENT
Start: 2024-03-02 | End: 2024-03-02 | Stop reason: ALTCHOICE

## 2024-03-02 RX ORDER — ONDANSETRON 2 MG/ML
INJECTION INTRAMUSCULAR; INTRAVENOUS PRN
Status: DISCONTINUED | OUTPATIENT
Start: 2024-03-02 | End: 2024-03-02 | Stop reason: SDUPTHER

## 2024-03-02 RX ADMIN — FENTANYL CITRATE 5 MCG: 50 INJECTION, SOLUTION INTRAMUSCULAR; INTRAVENOUS at 11:30

## 2024-03-02 RX ADMIN — CEFEPIME 2000 MG: 2 INJECTION, POWDER, FOR SOLUTION INTRAVENOUS at 00:38

## 2024-03-02 RX ADMIN — VANCOMYCIN HYDROCHLORIDE 500 MG: 500 INJECTION, POWDER, LYOPHILIZED, FOR SOLUTION INTRAVENOUS at 17:46

## 2024-03-02 RX ADMIN — PREGABALIN 50 MG: 25 CAPSULE ORAL at 20:14

## 2024-03-02 RX ADMIN — Medication 1 TABLET: at 08:06

## 2024-03-02 RX ADMIN — FENTANYL CITRATE 10 MCG: 50 INJECTION, SOLUTION INTRAMUSCULAR; INTRAVENOUS at 11:25

## 2024-03-02 RX ADMIN — PROPOFOL 10 MG: 10 INJECTION, EMULSION INTRAVENOUS at 11:48

## 2024-03-02 RX ADMIN — HYDROMORPHONE HYDROCHLORIDE 0.25 MG: 1 INJECTION, SOLUTION INTRAMUSCULAR; INTRAVENOUS; SUBCUTANEOUS at 15:16

## 2024-03-02 RX ADMIN — FENTANYL CITRATE 5 MCG: 50 INJECTION, SOLUTION INTRAMUSCULAR; INTRAVENOUS at 11:51

## 2024-03-02 RX ADMIN — HYDROMORPHONE HYDROCHLORIDE 0.25 MG: 1 INJECTION, SOLUTION INTRAMUSCULAR; INTRAVENOUS; SUBCUTANEOUS at 21:34

## 2024-03-02 RX ADMIN — AMLODIPINE BESYLATE 10 MG: 5 TABLET ORAL at 08:06

## 2024-03-02 RX ADMIN — FENTANYL CITRATE 5 MCG: 50 INJECTION, SOLUTION INTRAMUSCULAR; INTRAVENOUS at 11:47

## 2024-03-02 RX ADMIN — INSULIN LISPRO 7 UNITS: 100 INJECTION, SOLUTION INTRAVENOUS; SUBCUTANEOUS at 21:35

## 2024-03-02 RX ADMIN — DEXAMETHASONE SODIUM PHOSPHATE 4 MG: 4 INJECTION, SOLUTION INTRAMUSCULAR; INTRAVENOUS at 11:35

## 2024-03-02 RX ADMIN — PROPOFOL 20 MCG/KG/MIN: 10 INJECTION, EMULSION INTRAVENOUS at 11:25

## 2024-03-02 RX ADMIN — SODIUM CHLORIDE, PRESERVATIVE FREE 10 ML: 5 INJECTION INTRAVENOUS at 20:18

## 2024-03-02 RX ADMIN — LOSARTAN POTASSIUM 50 MG: 25 TABLET, FILM COATED ORAL at 08:06

## 2024-03-02 RX ADMIN — PREGABALIN 50 MG: 25 CAPSULE ORAL at 08:06

## 2024-03-02 RX ADMIN — PROPOFOL 10 MG: 10 INJECTION, EMULSION INTRAVENOUS at 11:24

## 2024-03-02 RX ADMIN — FENTANYL CITRATE 5 MCG: 50 INJECTION, SOLUTION INTRAMUSCULAR; INTRAVENOUS at 11:33

## 2024-03-02 RX ADMIN — LIDOCAINE HYDROCHLORIDE 50 MG: 20 INJECTION, SOLUTION EPIDURAL; INFILTRATION; INTRACAUDAL; PERINEURAL at 11:26

## 2024-03-02 RX ADMIN — ASPIRIN 81 MG: 81 TABLET, COATED ORAL at 08:06

## 2024-03-02 RX ADMIN — INSULIN LISPRO 8 UNITS: 100 INJECTION, SOLUTION INTRAVENOUS; SUBCUTANEOUS at 17:26

## 2024-03-02 RX ADMIN — ALOGLIPTIN 6.25 MG: 6.25 TABLET, FILM COATED ORAL at 08:06

## 2024-03-02 RX ADMIN — ONDANSETRON 4 MG: 2 INJECTION INTRAMUSCULAR; INTRAVENOUS at 11:35

## 2024-03-02 RX ADMIN — SODIUM CHLORIDE, POTASSIUM CHLORIDE, SODIUM LACTATE AND CALCIUM CHLORIDE: 600; 310; 30; 20 INJECTION, SOLUTION INTRAVENOUS at 11:21

## 2024-03-02 RX ADMIN — PROPOFOL 10 MG: 10 INJECTION, EMULSION INTRAVENOUS at 11:27

## 2024-03-02 RX ADMIN — SODIUM CHLORIDE, PRESERVATIVE FREE 10 ML: 5 INJECTION INTRAVENOUS at 08:06

## 2024-03-02 RX ADMIN — CEFEPIME 2000 MG: 2 INJECTION, POWDER, FOR SOLUTION INTRAVENOUS at 13:13

## 2024-03-02 ASSESSMENT — PAIN DESCRIPTION - LOCATION
LOCATION: TOE (COMMENT WHICH ONE)

## 2024-03-02 ASSESSMENT — PAIN - FUNCTIONAL ASSESSMENT: PAIN_FUNCTIONAL_ASSESSMENT: PREVENTS OR INTERFERES SOME ACTIVE ACTIVITIES AND ADLS

## 2024-03-02 ASSESSMENT — PAIN DESCRIPTION - DESCRIPTORS
DESCRIPTORS: ACHING;THROBBING
DESCRIPTORS: ACHING
DESCRIPTORS: ACHING;THROBBING

## 2024-03-02 ASSESSMENT — PAIN DESCRIPTION - ORIENTATION
ORIENTATION: LEFT

## 2024-03-02 ASSESSMENT — PAIN SCALES - GENERAL
PAINLEVEL_OUTOF10: 4
PAINLEVEL_OUTOF10: 10
PAINLEVEL_OUTOF10: 5
PAINLEVEL_OUTOF10: 0

## 2024-03-02 NOTE — ANESTHESIA POSTPROCEDURE EVALUATION
Department of Anesthesiology  Postprocedure Note    Patient: Roxanne Hill  MRN: [id number]  YOB: 1923  Date of evaluation: 3/2/2024    Procedure Summary       Date: 03/02/24 Room / Location: [facility] MAIN OR M2 / [facility] MAIN OR    Anesthesia Start: 1121 Anesthesia Stop: 1203    Procedure: LEFT GREAT TOE AMPUTATION (Left: Foot) Diagnosis:       Osteomyelitis of left foot, unspecified type (HCC)      (Osteomyelitis of left foot, unspecified type (HCC) [M86.9])    Providers: Bib Alexander DPM Responsible Provider: Hector Jewell MD    Anesthesia Type: MAC ASA Status: 3            Anesthesia Type: MAC    Bj Phase I: Bj Score: 9    Bj Phase II:      Anesthesia Post Evaluation    Patient location during evaluation: PACU  Patient participation: complete - patient participated  Level of consciousness: sleepy but conscious and responsive to verbal stimuli  Airway patency: patent  Nausea & Vomiting: no vomiting and no nausea  Cardiovascular status: blood pressure returned to baseline and hemodynamically stable  Respiratory status: acceptable  Hydration status: stable    No notable events documented.

## 2024-03-02 NOTE — PROGRESS NOTES
Bedside shift change report given to RENATA Gonzalez (oncoming nurse) by RENATA Pastrana (offgoing nurse). Report included the following information Nurse Handoff Report, Index, ED Encounter Summary, ED SBAR, Adult Overview, Intake/Output, MAR, Recent Results, Med Rec Status, Cardiac Rhythm NSR, Alarm Parameters, Quality Measures, and Neuro Assessment. Patient is currently resting in her assigned room. Patient is awake, alert, and oriented.

## 2024-03-02 NOTE — PROGRESS NOTES
Hospitalist Progress Note    NAME:   Roxanne Hill   : 1923   MRN: [id number]     Date/Time: 3/2/2024 2:22 PM  Patient PCP: Fabi Courtney APRN - NP    Estimated discharge date: 3/6  Barriers: cultures, symptom improvement, Podiatry clearance      Assessment / Plan:    Infected foot wound, left great toe  PAD  L foot Xray : No acute abnormality   BC : NGTD  Lactic: 0.9, Procalcitonin: 0.08  - s/p recent LLE revascularization with artherectomy/balloon angioplasty and stenting (24)  - given Unasyn and Vancomycin in ED  - recently placed on antibiotic regimen with Augmentin  - Vascular surgery consultation, greatly appreciate expertise  - Podiatry following, S/P L great toe amputation to IPJ, L heel excisional debridement 3/02/24  - continue Vancomycin and Cefepime for antibiotic coverage  - pain management     DM  A1C: 8.8  - BG check with Lispro for SSI coverage  - continue Lantus, Januvia  - hold Metformin for now     Hypertension  - on Losartan, Amlodipine     Hyperlipidemia  - diet controlled     GERD  - daily PPI     Osteoporosis  - continue Calcium with Vitamin D      Medical Decision Making:   I personally reviewed labs: BMP, CBC  I personally reviewed imaging: none  I personally reviewed EKG: NSR  Toxic drug monitoring:none  Discussed case with:  attending, consult        Code Status: Full  DVT Prophylaxis: SCD  GI Prophylaxis: none    Subjective:     Chief Complaint / Reason for Physician Visit  \"My foot hurts\".      Seen patient s/p surgical procedure. Awake and complaining of pain, Oral and IV PRN pain medication ordered. L foot surgical dressing D/I.    Discussed with RN events overnight.     HPI:  Roxanne Hill is a 89 y.o.  female with PMHx significant for hypertension, diet-controlled hyperlipidemia, DM, PAD with L great toe ulceration, osteoporosis and GERD with CC of L great toe wound and pain. Patient recently seen at [facility] was started on IV Unasyn and  normal speech,   Psych:   Good insight. Not anxious nor agitated  Skin:  No rashes.  No jaundice    Reviewed most current lab test results and cultures  YES  Reviewed most current radiology test results   YES  Review and summation of old records today    NO  Reviewed patient's current orders and MAR    YES  PMH/SH reviewed - no change compared to H&P    Procedures: see electronic medical records for all procedures/Xrays and details which were not copied into this note but were reviewed prior to creation of Plan.      LABS:  I reviewed today's most current labs and imaging studies.  Pertinent labs include:  Recent Labs     02/29/24  1030 03/01/24  0251 03/02/24  0522   WBC 7.6 5.9 5.2   HGB 11.8 11.0* 10.6*   HCT 38.4 34.7* 34.7*    211 214     Recent Labs     02/29/24  1030 03/01/24  0251 03/02/24  0522    135* 137   K 3.9 3.9 3.6    106 108   CO2 28 26 26   GLUCOSE 307* 129* 253*   BUN 25* 22* 21*   CREATININE 0.96 0.80 0.79   CALCIUM 9.2 8.7 8.4*   LABALBU 3.0*  --   --    BILITOT 0.3  --   --    AST 10*  --   --    ALT 18  --   --    INR 1.0  --   --        Signed: RAJENDRA Barron NP

## 2024-03-02 NOTE — PROGRESS NOTES
End of Shift Note    Bedside shift change report given to Deniz (oncoming nurse) by Lisa Garcia RN (offgoing nurse).  Report included the following information SBAR, Kardex, and MAR    Shift worked:  7p-7a     Shift summary and any significant changes:     Humalog was held due to the patients blood glucose level, see MAR.  Scheduled medications were given, see MAR.  A new IV was established.  Patient is up with the assistance of one with a walker.  Patient has been NPO since midnight.  Patient teaching and routine rounding has been done.     Concerns for physician to address:       Zone phone for oncoming shift:          Activity:     Number times ambulated in hallways past shift: 0  Number of times OOB to chair past shift: 0    Cardiac:   Cardiac Monitoring: Yes           Access:  Current line(s): PIV     Genitourinary:   Urinary status: voiding    Respiratory:      Chronic home O2 use?: NO  Incentive spirometer at bedside: NO       GI:     Current diet:  Diet NPO  Passing flatus: YES  Tolerating current diet: YES       Pain Management:   Patient states pain is manageable on current regimen: YES    Skin:     Interventions: increase time out of bed    Patient Safety:  Fall Score:    Interventions: bed/chair alarm, assistive device (walker, cane. etc), gripper socks, and pt to call before getting OOB       Length of Stay:  Expected LOS: 6  Actual LOS: 2      Lisa Garcia RN

## 2024-03-02 NOTE — PROGRESS NOTES
Pharmacy Antimicrobial Kinetic Dosing    Indication for Antimicrobials: SSTI (LLE cellulitis/gangrene of left great toe/possible osteomyelitis)    Current Regimen of Each Antimicrobial:  Vancomycin Pharmacy to Dose; Start Date ; Day # 3  Cefepime 1g IV q12h; Start Date ; Day # 3    Previous Antimicrobial Therapy:  -Unasyn    -Ctx       Goal Level: Vancomycin -600    Date Dose & Interval Measured (mcg/mL) Predicted AUC   3/2 @ 0522 500mg q 18h 11.2 331                 Significant Cultures:    Blood- NGTD, prelim     Labs:  Recent Labs     Units 24  0522 24  0251 24  1030 24  1252   CREATININE MG/DL 0.79 0.80 0.96 0.84   BUN MG/DL 21* 22* 25* 23*   PROCAL ng/mL  --  0.08  --  0.08*   WBC K/uL 5.2 5.9 7.6 6.7     Temp (24hrs), Av.8 °F (37.1 °C), Min:98.1 °F (36.7 °C), Max:99.1 °F (37.3 °C)    Conditions for Dosing Consideration: None    Creatinine Clearance (mL/min): Estimated Creatinine Clearance: 33 mL/min (based on SCr of 0.79 mg/dL).     Impression/Plan:   vancomycin 500mg IV q18h subtherapeutic for , increase dose to 1000mg q 18h for auc 577.   Continue cefepime to 2g IV q12hr for crcl > 32 for poss bone infection  BMP ordered daily   Antimicrobial stop date 5 days     Pharmacy will follow daily and adjust medications as appropriate for renal function and/or serum levels.    Thank you,  Nash Callejas [facility]

## 2024-03-02 NOTE — BRIEF OP NOTE
Brief Postoperative Note      Patient: Roxanne Hill  YOB: 1923  MRN: [id number]    Date of Procedure: 3/2/2024    Pre-Op Diagnosis Codes:     * Osteomyelitis of left foot, unspecified type ([facility]) [M86.9] left heel Decubitus grade 2     Post-Op Diagnosis: Same       Procedure(s):  LEFT GREAT TOE AMPUTATION to Inter Phalangeal joint, Left heel Excisional Debridement in to Sub Q level size approx. 6 sq cm    Surgeon(s):  Bib Alexander DPM    Assistant:  * No surgical staff found *    Anesthesia: Monitor Anesthesia Care    Hemostasis: Ankle Tourniquet at 250 mmHg    Estimated Blood Loss (mL): Minimal    Complications: None    Specimens:   ID Type Source Tests Collected by Time Destination   1 : LEFT FOOT WOUND Swab Foot CULTURE, ANAEROBIC, CULTURE, WOUND Bib Alexander DPM 3/2/2024 1109    2 : LEFT GREAT TOE Tissue Foot SURGICAL PATHOLOGY Bib Alexander DPM 3/2/2024 1146        Implants:  * No implants in log *      Drains: * No LDAs found *    Findings: viable bone and soft tissue margins      Electronically signed by Bib Alexander DPM on 3/2/2024 at 12:03 PM

## 2024-03-02 NOTE — ANESTHESIA PRE PROCEDURE
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   • Diabetes mellitus ([facility]) E11.9   • Fracture of pelvis ([facility]) S32.9XXA   • Impacted cerumen of right ear H61.21   • Cellulitis of foot L03.119   • Metatarsalgia M77.40   • 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   • Deviated nasal septum J34.2   • Epistaxis R04.0   • Hip fracture ([facility]) S72.009A   • Adult idiopathic generalized osteoporosis M81.8   • History of hemiarthroplasty of left hip Z96.642   • 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   • Hypercholesterolemia E78.00   • Hypertension I10   • Nail dystrophy L60.3   • Osteoarthritis of left knee M17.12   • Pes anserinus bursitis of left knee M70.52   • Trochanteric bursitis, left hip M70.62   • Type 2 diabetes mellitus without complications ([facility]) E11.9   • Senile osteoporosis M81.0   • Cellulitis of left foot L03.116       Past Medical History:        Diagnosis Date   • DM (diabetes mellitus) ([facility])    • High cholesterol    • History of multiple allergies    • HTN (hypertension)    • Hypertension    • Osteoporosis    • Type 2 diabetes mellitus ([facility])        Past Surgical History:        Procedure Laterality Date   • APPENDECTOMY     • HIP ARTHROSCOPY     • HYSTERECTOMY (CERVIX STATUS UNKNOWN)     • KNEE ARTHROSCOPY     • TOTAL HIP ARTHROPLASTY         Social History:    Social History     Tobacco Use   • Smoking status: Never   • Smokeless tobacco: Never   Substance Use Topics   • Alcohol use: Never                                Counseling given: Not Answered      Vital Signs (Current):   Vitals:

## 2024-03-03 LAB
ANION GAP SERPL CALC-SCNC: 4 MMOL/L (ref 5–15)
BASOPHILS # BLD: 0 K/UL (ref 0–0.1)
BASOPHILS NFR BLD: 1 % (ref 0–1)
BUN SERPL-MCNC: 26 MG/DL (ref 6–20)
BUN/CREAT SERPL: 31 (ref 12–20)
CALCIUM SERPL-MCNC: 8.5 MG/DL (ref 8.5–10.1)
CHLORIDE SERPL-SCNC: 107 MMOL/L (ref 97–108)
CO2 SERPL-SCNC: 25 MMOL/L (ref 21–32)
CREAT SERPL-MCNC: 0.83 MG/DL (ref 0.55–1.02)
DIFFERENTIAL METHOD BLD: ABNORMAL
EOSINOPHIL # BLD: 0 K/UL (ref 0–0.4)
EOSINOPHIL NFR BLD: 0 % (ref 0–7)
ERYTHROCYTE [DISTWIDTH] IN BLOOD BY AUTOMATED COUNT: 13.3 % (ref 11.5–14.5)
GLUCOSE BLD STRIP.AUTO-MCNC: 212 MG/DL (ref 65–117)
GLUCOSE BLD STRIP.AUTO-MCNC: 245 MG/DL (ref 65–117)
GLUCOSE BLD STRIP.AUTO-MCNC: 292 MG/DL (ref 65–117)
GLUCOSE BLD STRIP.AUTO-MCNC: 314 MG/DL (ref 65–117)
GLUCOSE SERPL-MCNC: 309 MG/DL (ref 65–100)
HCT VFR BLD AUTO: 36.5 % (ref 35–47)
HGB BLD-MCNC: 11.1 G/DL (ref 11.5–16)
IMM GRANULOCYTES # BLD AUTO: 0 K/UL (ref 0–0.04)
IMM GRANULOCYTES NFR BLD AUTO: 1 % (ref 0–0.5)
LYMPHOCYTES # BLD: 1 K/UL (ref 0.8–3.5)
LYMPHOCYTES NFR BLD: 13 % (ref 12–49)
MCH RBC QN AUTO: 27.5 PG (ref 26–34)
MCHC RBC AUTO-ENTMCNC: 30.4 G/DL (ref 30–36.5)
MCV RBC AUTO: 90.3 FL (ref 80–99)
MONOCYTES # BLD: 0.5 K/UL (ref 0–1)
MONOCYTES NFR BLD: 7 % (ref 5–13)
NEUTS SEG # BLD: 5.9 K/UL (ref 1.8–8)
NEUTS SEG NFR BLD: 78 % (ref 32–75)
NRBC # BLD: 0 K/UL (ref 0–0.01)
NRBC BLD-RTO: 0 PER 100 WBC
PLATELET # BLD AUTO: 240 K/UL (ref 150–400)
PMV BLD AUTO: 10.3 FL (ref 8.9–12.9)
POTASSIUM SERPL-SCNC: 4.1 MMOL/L (ref 3.5–5.1)
RBC # BLD AUTO: 4.04 M/UL (ref 3.8–5.2)
SERVICE CMNT-IMP: ABNORMAL
SODIUM SERPL-SCNC: 136 MMOL/L (ref 136–145)
WBC # BLD AUTO: 7.5 K/UL (ref 3.6–11)

## 2024-03-03 PROCEDURE — 80048 BASIC METABOLIC PNL TOTAL CA: CPT

## 2024-03-03 PROCEDURE — 6360000002 HC RX W HCPCS: Performed by: STUDENT IN AN ORGANIZED HEALTH CARE EDUCATION/TRAINING PROGRAM

## 2024-03-03 PROCEDURE — 36415 COLL VENOUS BLD VENIPUNCTURE: CPT

## 2024-03-03 PROCEDURE — 2500000003 HC RX 250 WO HCPCS: Performed by: NURSE PRACTITIONER

## 2024-03-03 PROCEDURE — 6370000000 HC RX 637 (ALT 250 FOR IP): Performed by: NURSE PRACTITIONER

## 2024-03-03 PROCEDURE — 82962 GLUCOSE BLOOD TEST: CPT

## 2024-03-03 PROCEDURE — 2700000000 HC OXYGEN THERAPY PER DAY

## 2024-03-03 PROCEDURE — 2580000003 HC RX 258: Performed by: NURSE PRACTITIONER

## 2024-03-03 PROCEDURE — 6370000000 HC RX 637 (ALT 250 FOR IP): Performed by: INTERNAL MEDICINE

## 2024-03-03 PROCEDURE — 94760 N-INVAS EAR/PLS OXIMETRY 1: CPT

## 2024-03-03 PROCEDURE — 85025 COMPLETE CBC W/AUTO DIFF WBC: CPT

## 2024-03-03 PROCEDURE — 2580000003 HC RX 258: Performed by: STUDENT IN AN ORGANIZED HEALTH CARE EDUCATION/TRAINING PROGRAM

## 2024-03-03 PROCEDURE — 1100000000 HC RM PRIVATE

## 2024-03-03 RX ORDER — INSULIN GLARGINE 100 [IU]/ML
14 INJECTION, SOLUTION SUBCUTANEOUS NIGHTLY
Status: DISCONTINUED | OUTPATIENT
Start: 2024-03-03 | End: 2024-03-07 | Stop reason: HOSPADM

## 2024-03-03 RX ORDER — GLUCAGON 1 MG/ML
1 KIT INJECTION PRN
Status: DISCONTINUED | OUTPATIENT
Start: 2024-03-03 | End: 2024-03-07 | Stop reason: HOSPADM

## 2024-03-03 RX ORDER — DEXTROSE MONOHYDRATE 100 MG/ML
INJECTION, SOLUTION INTRAVENOUS CONTINUOUS PRN
Status: DISCONTINUED | OUTPATIENT
Start: 2024-03-03 | End: 2024-03-07 | Stop reason: HOSPADM

## 2024-03-03 RX ADMIN — AMLODIPINE BESYLATE 10 MG: 5 TABLET ORAL at 08:13

## 2024-03-03 RX ADMIN — INSULIN GLARGINE 14 UNITS: 100 INJECTION, SOLUTION SUBCUTANEOUS at 21:29

## 2024-03-03 RX ADMIN — INSULIN LISPRO 2 UNITS: 100 INJECTION, SOLUTION INTRAVENOUS; SUBCUTANEOUS at 17:34

## 2024-03-03 RX ADMIN — PREGABALIN 50 MG: 25 CAPSULE ORAL at 21:29

## 2024-03-03 RX ADMIN — OXYCODONE 5 MG: 5 TABLET ORAL at 10:22

## 2024-03-03 RX ADMIN — SODIUM CHLORIDE, PRESERVATIVE FREE 10 ML: 5 INJECTION INTRAVENOUS at 21:33

## 2024-03-03 RX ADMIN — HYDROMORPHONE HYDROCHLORIDE 0.25 MG: 1 INJECTION, SOLUTION INTRAMUSCULAR; INTRAVENOUS; SUBCUTANEOUS at 09:30

## 2024-03-03 RX ADMIN — VANCOMYCIN HYDROCHLORIDE 1000 MG: 1 INJECTION, POWDER, LYOPHILIZED, FOR SOLUTION INTRAVENOUS at 09:35

## 2024-03-03 RX ADMIN — INSULIN LISPRO 6 UNITS: 100 INJECTION, SOLUTION INTRAVENOUS; SUBCUTANEOUS at 11:29

## 2024-03-03 RX ADMIN — Medication 1 TABLET: at 08:14

## 2024-03-03 RX ADMIN — CEFEPIME 2000 MG: 2 INJECTION, POWDER, FOR SOLUTION INTRAVENOUS at 02:39

## 2024-03-03 RX ADMIN — ALOGLIPTIN 6.25 MG: 6.25 TABLET, FILM COATED ORAL at 08:13

## 2024-03-03 RX ADMIN — HYDROMORPHONE HYDROCHLORIDE 0.25 MG: 1 INJECTION, SOLUTION INTRAMUSCULAR; INTRAVENOUS; SUBCUTANEOUS at 21:27

## 2024-03-03 RX ADMIN — LOSARTAN POTASSIUM 50 MG: 25 TABLET, FILM COATED ORAL at 08:14

## 2024-03-03 RX ADMIN — SODIUM CHLORIDE, PRESERVATIVE FREE 10 ML: 5 INJECTION INTRAVENOUS at 08:14

## 2024-03-03 RX ADMIN — ASPIRIN 81 MG: 81 TABLET, COATED ORAL at 08:14

## 2024-03-03 RX ADMIN — PREGABALIN 50 MG: 25 CAPSULE ORAL at 08:14

## 2024-03-03 RX ADMIN — POLYETHYLENE GLYCOL 3350 17 G: 17 POWDER, FOR SOLUTION ORAL at 11:29

## 2024-03-03 RX ADMIN — INSULIN LISPRO 4 UNITS: 100 INJECTION, SOLUTION INTRAVENOUS; SUBCUTANEOUS at 08:14

## 2024-03-03 RX ADMIN — CEFEPIME 2000 MG: 2 INJECTION, POWDER, FOR SOLUTION INTRAVENOUS at 13:10

## 2024-03-03 ASSESSMENT — PAIN DESCRIPTION - LOCATION
LOCATION: FOOT
LOCATION: FOOT;TOE (COMMENT WHICH ONE)
LOCATION: FOOT

## 2024-03-03 ASSESSMENT — PAIN DESCRIPTION - DESCRIPTORS
DESCRIPTORS: ACHING;THROBBING
DESCRIPTORS: ACHING
DESCRIPTORS: ACHING

## 2024-03-03 ASSESSMENT — PAIN DESCRIPTION - ORIENTATION
ORIENTATION: LEFT

## 2024-03-03 ASSESSMENT — PAIN SCALES - GENERAL
PAINLEVEL_OUTOF10: 0
PAINLEVEL_OUTOF10: 7
PAINLEVEL_OUTOF10: 7

## 2024-03-03 NOTE — PROGRESS NOTES
Hospitalist Progress Note    NAME:   Roxanne Hill   : 1923   MRN: [id number]     Date/Time: 3/3/2024 10:04 AM  Patient PCP: Fabi Courtney APRN - [facility]    Estimated discharge date: 3/6  Barriers: cultures, symptom improvement, Podiatry clearance       Assessment / Plan:    Infected foot wound, left great toe  S/P L great toe amputation to IPJ, L heel excisional debridement 3/02/24  PAD  L foot Xray : No acute abnormality   BC : NGTD  Anaerobic culture 3/2: in process  WC 3/2: No organism seen  Lactic: 0.9, Procalcitonin: 0.08  - s/p recent LLE revascularization with artherectomy/balloon angioplasty and stenting (24)  - given Unasyn and Vancomycin in ED  - recently placed on antibiotic regimen with Augmentin  - Vascular surgery consultation, greatly appreciate expertise  - Podiatry following,   - continue Vancomycin and Cefepime for antibiotic coverage  - pain management     DM  A1C: 8.8  - BG check with Lispro for SSI coverage  - continue Lantus, Januvia  - hold Metformin for now     Hypertension  - on Losartan, Amlodipine     Hyperlipidemia  - diet controlled     GERD  - daily PPI     Osteoporosis  - continue Calcium with Vitamin D        Medical Decision Making:   I personally reviewed labs: BMP, CBC  I personally reviewed imaging: none  I personally reviewed EKG: NSR  Toxic drug monitoring: BG, Vanc level  Discussed case with: attending, consult        Code Status: Full  DVT Prophylaxis: SCD  GI Prophylaxis: none    Subjective:     Chief Complaint / Reason for Physician Visit    Noted elevated BG overnight, scheduled q hs Lantus resumed.    \"I feel better\".      Patient reporting better pain control on surgical site. Remained afebrile, denies SOB. Remained on NC 2 L, ordered IS q 4 while awake. Encouraged deep breathing at regular interval.    Discussed with RN events overnight.     HPI:  Roxanne Hill is a 89 y.o.  female with PMHx significant for hypertension, diet-controlled

## 2024-03-03 NOTE — PROGRESS NOTES
Nursing contacted Nocturnist/cross cover provider and notified patient accucheck glucose 450. No other concerns reported. VSS. Ordered ordered 7 units total ssi x1 now. Patient denies any further complaints or concerns. No acute distress reported. Nursing to notify Hospitalist for further/continued concerns. Will remain available overnight for further concerns if nursing/patient needs. Will defer further evaluation/management to the day shift primary care team.    Non-billable note.

## 2024-03-04 ENCOUNTER — APPOINTMENT (OUTPATIENT)
Facility: HOSPITAL | Age: 89
DRG: 256 | End: 2024-03-04
Payer: MEDICARE

## 2024-03-04 LAB
ANION GAP SERPL CALC-SCNC: 5 MMOL/L (ref 5–15)
BACTERIA SPEC CULT: NORMAL
BACTERIA SPEC CULT: NORMAL
BUN SERPL-MCNC: 29 MG/DL (ref 6–20)
BUN/CREAT SERPL: 41 (ref 12–20)
CALCIUM SERPL-MCNC: 8.4 MG/DL (ref 8.5–10.1)
CHLORIDE SERPL-SCNC: 109 MMOL/L (ref 97–108)
CO2 SERPL-SCNC: 24 MMOL/L (ref 21–32)
CREAT SERPL-MCNC: 0.71 MG/DL (ref 0.55–1.02)
GLUCOSE BLD STRIP.AUTO-MCNC: 138 MG/DL (ref 65–117)
GLUCOSE BLD STRIP.AUTO-MCNC: 186 MG/DL (ref 65–117)
GLUCOSE BLD STRIP.AUTO-MCNC: 217 MG/DL (ref 65–117)
GLUCOSE BLD STRIP.AUTO-MCNC: 287 MG/DL (ref 65–117)
GLUCOSE SERPL-MCNC: 133 MG/DL (ref 65–100)
GRAM STN SPEC: NORMAL
GRAM STN SPEC: NORMAL
POTASSIUM SERPL-SCNC: 4.2 MMOL/L (ref 3.5–5.1)
SERVICE CMNT-IMP: ABNORMAL
SERVICE CMNT-IMP: NORMAL
SERVICE CMNT-IMP: NORMAL
SODIUM SERPL-SCNC: 138 MMOL/L (ref 136–145)

## 2024-03-04 PROCEDURE — 82962 GLUCOSE BLOOD TEST: CPT

## 2024-03-04 PROCEDURE — 80048 BASIC METABOLIC PNL TOTAL CA: CPT

## 2024-03-04 PROCEDURE — 71045 X-RAY EXAM CHEST 1 VIEW: CPT

## 2024-03-04 PROCEDURE — 2700000000 HC OXYGEN THERAPY PER DAY

## 2024-03-04 PROCEDURE — 94760 N-INVAS EAR/PLS OXIMETRY 1: CPT

## 2024-03-04 PROCEDURE — 2580000003 HC RX 258: Performed by: NURSE PRACTITIONER

## 2024-03-04 PROCEDURE — 6370000000 HC RX 637 (ALT 250 FOR IP): Performed by: NURSE PRACTITIONER

## 2024-03-04 PROCEDURE — 2500000003 HC RX 250 WO HCPCS: Performed by: NURSE PRACTITIONER

## 2024-03-04 PROCEDURE — 6360000002 HC RX W HCPCS: Performed by: STUDENT IN AN ORGANIZED HEALTH CARE EDUCATION/TRAINING PROGRAM

## 2024-03-04 PROCEDURE — 36415 COLL VENOUS BLD VENIPUNCTURE: CPT

## 2024-03-04 PROCEDURE — 2580000003 HC RX 258: Performed by: STUDENT IN AN ORGANIZED HEALTH CARE EDUCATION/TRAINING PROGRAM

## 2024-03-04 PROCEDURE — 1100000000 HC RM PRIVATE

## 2024-03-04 PROCEDURE — 6370000000 HC RX 637 (ALT 250 FOR IP): Performed by: INTERNAL MEDICINE

## 2024-03-04 RX ORDER — IPRATROPIUM BROMIDE AND ALBUTEROL SULFATE 2.5; .5 MG/3ML; MG/3ML
1 SOLUTION RESPIRATORY (INHALATION) EVERY 4 HOURS PRN
Status: DISCONTINUED | OUTPATIENT
Start: 2024-03-04 | End: 2024-03-07 | Stop reason: HOSPADM

## 2024-03-04 RX ORDER — ENOXAPARIN SODIUM 100 MG/ML
30 INJECTION SUBCUTANEOUS DAILY
Status: DISCONTINUED | OUTPATIENT
Start: 2024-03-05 | End: 2024-03-07 | Stop reason: HOSPADM

## 2024-03-04 RX ORDER — POLYETHYLENE GLYCOL 3350 17 G/17G
17 POWDER, FOR SOLUTION ORAL DAILY
Status: DISCONTINUED | OUTPATIENT
Start: 2024-03-05 | End: 2024-03-05

## 2024-03-04 RX ORDER — SENNA AND DOCUSATE SODIUM 50; 8.6 MG/1; MG/1
2 TABLET, FILM COATED ORAL DAILY
Status: DISCONTINUED | OUTPATIENT
Start: 2024-03-04 | End: 2024-03-07 | Stop reason: HOSPADM

## 2024-03-04 RX ADMIN — CEFEPIME 2000 MG: 2 INJECTION, POWDER, FOR SOLUTION INTRAVENOUS at 02:30

## 2024-03-04 RX ADMIN — VANCOMYCIN HYDROCHLORIDE 1000 MG: 1 INJECTION, POWDER, LYOPHILIZED, FOR SOLUTION INTRAVENOUS at 22:33

## 2024-03-04 RX ADMIN — POLYETHYLENE GLYCOL 3350 17 G: 17 POWDER, FOR SOLUTION ORAL at 14:29

## 2024-03-04 RX ADMIN — ASPIRIN 81 MG: 81 TABLET, COATED ORAL at 08:51

## 2024-03-04 RX ADMIN — SODIUM CHLORIDE, PRESERVATIVE FREE 10 ML: 5 INJECTION INTRAVENOUS at 20:23

## 2024-03-04 RX ADMIN — SODIUM CHLORIDE: 9 INJECTION, SOLUTION INTRAVENOUS at 02:28

## 2024-03-04 RX ADMIN — PREGABALIN 50 MG: 25 CAPSULE ORAL at 08:52

## 2024-03-04 RX ADMIN — CEFEPIME 2000 MG: 2 INJECTION, POWDER, FOR SOLUTION INTRAVENOUS at 14:36

## 2024-03-04 RX ADMIN — ALOGLIPTIN 6.25 MG: 6.25 TABLET, FILM COATED ORAL at 08:51

## 2024-03-04 RX ADMIN — SODIUM CHLORIDE, PRESERVATIVE FREE 10 ML: 5 INJECTION INTRAVENOUS at 08:52

## 2024-03-04 RX ADMIN — Medication 1 TABLET: at 08:51

## 2024-03-04 RX ADMIN — LOSARTAN POTASSIUM 50 MG: 25 TABLET, FILM COATED ORAL at 08:51

## 2024-03-04 RX ADMIN — DOCUSATE SODIUM 50MG AND SENNOSIDES 8.6MG 2 TABLET: 8.6; 5 TABLET, FILM COATED ORAL at 14:30

## 2024-03-04 RX ADMIN — VANCOMYCIN HYDROCHLORIDE 1000 MG: 1 INJECTION, POWDER, LYOPHILIZED, FOR SOLUTION INTRAVENOUS at 04:42

## 2024-03-04 RX ADMIN — HYDROMORPHONE HYDROCHLORIDE 0.25 MG: 1 INJECTION, SOLUTION INTRAMUSCULAR; INTRAVENOUS; SUBCUTANEOUS at 20:11

## 2024-03-04 RX ADMIN — AMLODIPINE BESYLATE 10 MG: 5 TABLET ORAL at 08:52

## 2024-03-04 RX ADMIN — INSULIN LISPRO 4 UNITS: 100 INJECTION, SOLUTION INTRAVENOUS; SUBCUTANEOUS at 14:30

## 2024-03-04 RX ADMIN — PREGABALIN 50 MG: 25 CAPSULE ORAL at 20:10

## 2024-03-04 RX ADMIN — INSULIN GLARGINE 14 UNITS: 100 INJECTION, SOLUTION SUBCUTANEOUS at 20:21

## 2024-03-04 ASSESSMENT — PAIN DESCRIPTION - DESCRIPTORS: DESCRIPTORS: ACHING;STABBING;SHARP

## 2024-03-04 ASSESSMENT — PAIN SCALES - GENERAL: PAINLEVEL_OUTOF10: 10

## 2024-03-04 ASSESSMENT — PAIN DESCRIPTION - ORIENTATION: ORIENTATION: LEFT

## 2024-03-04 ASSESSMENT — PAIN DESCRIPTION - LOCATION: LOCATION: LEG;TOE (COMMENT WHICH ONE);FOOT

## 2024-03-04 NOTE — PROGRESS NOTES
Physician Progress Note      PATIENT:               MARIXA MEZA  CSN #:                  [id number]  :                       1923  ADMIT DATE:       2024 9:51 AM  DISCH DATE:  RESPONDING  PROVIDER #:        Bib Alexander DPM          QUERY TEXT:    Patient admitted with Infected foot wound, left great toe. Per Op note dated   2024 documentation of debridement. To accurately reflect the procedure   performed please document if debridement was excisional or nonexcisional and   the deepest depth of tissue removed as down to and including:    Risk Factors: PAD    Clinical Indicators:  3/3 Op note: Interphalangeal joint and left heel excisional debridement to   subcutaneous level, size approximately 6 cm2.  \"plantar heel was examined and debrided sharply with a #15 sterile blade and   the 1 small area was then deeper than other part, which was thoroughly   debrided down to subcutaneous tissue\"    Treatment: S/P L great toe amputation to IPJ, L heel excisional debridement   3/02/24      Thank you,  Roslyn Alford RN, CDI, CRCR  [email or web address] or via [employer]  Options provided:  -- Nonexcisional debridement of skin  -- Excisional debridement of skin  -- Nonexcisional debridement of subcutaneous tissue  -- Excisional debridement of subcutaneous tissue  -- Other - I will add my own diagnosis  -- Disagree - Not applicable / Not valid  -- Disagree - Clinically unable to determine / Unknown  -- Refer to Clinical Documentation Reviewer    PROVIDER RESPONSE TEXT:    Excisional debridement of subcutaneous tissue of left heel was performed   during procedure on 2024.    Query created by: ROSLYN ALFORD on 3/4/2024 1:53 PM      Electronically signed by:  Bib Alexander DPM 3/4/2024 2:18 PM

## 2024-03-04 NOTE — PROGRESS NOTES
Hospitalist Progress Note    NAME:   Roxanne Hill   : 1923   MRN: [id number]     Date/Time: 3/4/2024 12:45 PM  Patient PCP: Fabi Courtney APRN - NP    Estimated discharge date: greater than 24 hours  Barriers: Clinical stability. Podiatry clearance.Awaiting final pathology report. Weaning of oxygen. PT/OT evaluation. CM to follow for discharge planning.      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       History of osteoporosis  - preliminary blood cultures on 24 shows NGTD  - wound culture on 3/2/24 shows few mixed skin luz isolated  - anaerobic culture on 3/4/24 shows no anaerobes isolated  - chest xray pending  - left foot xray on 24 shows:  No acute abnormality  - podiatry awaiting pathology report  - continue current pain medications  - continue IV cefepime and vancomycin  - continue calcium with vitamin D  - dressing changed by podiatry on 3/4/24  - WBAT to left foot with post op shoe  - elevate left foot while at rest  - keep 02 sat greater than 92%  - incentive spirometry  - turn, cough and deep breathe  - prn nebulizer treatments  - wean 02  - podiatry following    2. Diabetes Mellitus  - Hemoglobin A1c 8.8  - accuchecks  - continue lantus and januvia  - continue sliding scale  - hold metformin  - diabetic diet  - hypoglycemia protocol    3. HTN      Hyperlipidemia  - continue aspirin, norvasc, losartan, amlodipine    4. GERD  - continue PPI    PT/OT      Medical Decision Making:   I personally reviewed labs: yes  I personally reviewed imaging: yes  I personally reviewed EKG: yes  Toxic drug monitoring: lovenox, H/H  Discussed case with: patient, nursing, CM, Dr. Peacock        Code Status: Full Code  DVT Prophylaxis: Lovenox  GI prophylaxis: protonix    Subjective:     Chief Complaint / Reason for Physician Visit  \" I'm doing ok\"    Roxanne Hill is a 89 y.o.  female with PMHx

## 2024-03-04 NOTE — PROGRESS NOTES
End of Shift Note    Bedside shift change report given to Tiffany YANEZ (oncoming nurse) by Ger Carmona RN (offgoing nurse).  Report included the following information SBAR, Kardex, MAR, Recent Results, and Cardiac Rhythm AFib    Shift worked:  7p-7a     Shift summary and any significant changes:     Patient tolerated care over shift. Scheduled meds given and PRN dilaudid x1. Pt was able to rest over shift. Labs drawn. Caring rounds provide.        Expected LOS: 6  Actual LOS: 4      Ger Carmona RN

## 2024-03-04 NOTE — CARE COORDINATION
Care Management Initial Assessment       RUR: 15%  Readmission? No  1st IM letter given? Yes - 2/29/24  1st  letter given: No          CM introduce self, explain role and confirmed demographics with pt.    Pt lives at the [facility] in an independent apartment with no steps to enter.    Pt has a hx using the home health services provided by the [facility].    Pt also has a hx of [facility].    No hx of inpatient rehab.    Pt uses [facility] [employer] in [place].    At the time of d/c pt will need transportation.    CM will follow and assist with d/c planning.    Point of contact at [facility] is either Jeri Hurd or Donna and the number to reach them is [phone].   03/04/24 0938   Service Assessment   Patient Orientation Alert and Oriented   Cognition Alert   History Provided By Patient   Primary Caregiver Self   Support Systems Family Members;Friends/Neighbors;/  (Staff at the [facility])   Patient's Healthcare Decision Maker is: Named in Scanned ACP Document   PCP Verified by CM Yes   Last Visit to PCP   (2 weeks ago)   Prior Functional Level Assistance with the following:;Cooking;Housework;Shopping;Mobility   Current Functional Level Assistance with the following:;Cooking;Housework;Shopping;Mobility   Can patient return to prior living arrangement Yes   Family able to assist with home care needs: No   Would you like for me to discuss the discharge plan with any other family members/significant others, and if so, who? Yes  (Disha [phone]-[phone])   Financial Resources Medicare;Other (Comment)  (BCBS)   Community Resources None   Social/Functional History   Lives With Alone   Type of Home Senior housing apartment   Home Equipment Walker, rolling;Grab bars  (Built in shower seat)   Active  No   Patient's  Info [facility]/Family/Friends   Discharge Planning   Type of Residence Apartment   Current Services Prior To Admission None   Patient expects

## 2024-03-04 NOTE — OP NOTE
[facility]              [address] [place]  [address]                            OPERATIVE REPORT      PATIENT NAME: MARIXA MEZA                : 1923  MED REC NO: [id number]                       ROOM: 1118  ACCOUNT NO: [id number]                       ADMIT DATE: 2024  PROVIDER: Bib Alexander DPM    DATE OF SERVICE:  2024    PREOPERATIVE DIAGNOSES:       1. Osteomyelitis of the left foot and a left heel decubitus grade 2 ulceration left big toe.     2. Osteomyelitis of left great toe along with gangrene and left heel decubitus grade 2 ulceration.    POSTOPERATIVE DIAGNOSES:       1. Osteomyelitis of the left foot and a left heel decubitus grade 2 ulceration left big toe.     2. Osteomyelitis of left great toe along with gangrene and left heel decubitus grade 2 ulceration.    PROCEDURES PERFORMED:       1. Left great toe amputation.     2. Interphalangeal joint and left heel excisional debridement to subcutaneous level, size approximately 6 cm2.    SURGEON:  Bib Alexander DPM    ASSISTANT:  None.    ANESTHESIA:  MAC.    ESTIMATED BLOOD LOSS:  Minimal.    SPECIMENS REMOVED:  Distal toe was sent to pathology and deep cultures were taken at the proximal end.    INTRAOPERATIVE FINDINGS:  see below     COMPLICATIONS:  None.    IMPLANTS:  None.    INDICATIONS:  This patient has come in today and has come in for a nonhealing nearly painful left great toe with a distal scab and patient has had vascular evaluation done and the patient has been getting IV antibiotics.  The patient is ambulatory even though assisted with a walker.  The patient is 89 years old in a full code status and was given a chance for this to heal, but the wound was not healing and significantly inhibiting her ambulation status.  The patient was given options to remove the distal part of the toe.    DESCRIPTION OF PROCEDURE:  The patient was brought into OR and left on  the patient's bed in supine position.  IV sedation performed as per CRNA.  Local infiltration with 0.5% Marcaine plain was injected for a digital block and the left foot was prepped and draped in the usual sterile manner with left distal block as well as posterior heel block.  The left foot was prepped and draped in a sterile manner.  The tourniquet was inflated as previously mentioned and attention was directed to the left first toe as the first procedure.  After anesthesia check, an incision was made on the distal to the interphalangeal joint using #15 sterile blade.  The incision was deepened into the deeper soft tissue level, removing all the soft tissue with nonviable distal part into the interphalangeal joint level.  The distal plantar flap was designed to close the entire wound after removal of the nonviable tissue.  The proximal phalanx was then dislocated at the interphalangeal joint level and removed along with the nonviable distal part of the toe and the part of the toe was removed and sent for pathology.  Deep cultures were taken at this point.  Further debridement was done to remove any excessive fat and nonviable tissue margins and the area was then copiously irrigated with Irrisept followed with normal saline after the cultures had been taken and the wound edges were closed with a 2-0 locking suture material.    At this point, the plantar heel was examined and debrided sharply with a #15 sterile blade and the 1 small area was then deeper than other part, which was thoroughly debrided down to subcutaneous tissue, and this was also irrigated with Irrisept followed with normal saline and both wounds were covered with a mild compression bandage and nonadherent dressing and after releasing the tourniquet, noting hyperemia and minimal hemorrhage noted at incision site.  The patient tolerated the procedure and anesthesia well without complications and sent back to medical floor for medical management and

## 2024-03-04 NOTE — PROGRESS NOTES
Please contact the POA Rohini Frances [phone] she would like to know the plan when she is discharged.

## 2024-03-04 NOTE — PROGRESS NOTES
Seeing patient for a postop bandage change two day postop  Well co-apt incision mild edema, erythema no dehicense    Continue IV antibiotics  Can ambulate as tolerated with a postop shoe  Bandage change on Wednesday with opticcell and dry gauze. Loosely applied Nitza.  Will wait for the pathology report to plan further not ready for discharge yet

## 2024-03-05 LAB
ANION GAP SERPL CALC-SCNC: 0 MMOL/L (ref 5–15)
BASOPHILS # BLD: 0.1 K/UL (ref 0–0.1)
BASOPHILS NFR BLD: 1 % (ref 0–1)
BUN SERPL-MCNC: 22 MG/DL (ref 6–20)
BUN/CREAT SERPL: 31 (ref 12–20)
CALCIUM SERPL-MCNC: 8.4 MG/DL (ref 8.5–10.1)
CHLORIDE SERPL-SCNC: 107 MMOL/L (ref 97–108)
CO2 SERPL-SCNC: 30 MMOL/L (ref 21–32)
CREAT SERPL-MCNC: 0.72 MG/DL (ref 0.55–1.02)
DIFFERENTIAL METHOD BLD: ABNORMAL
EOSINOPHIL # BLD: 0.4 K/UL (ref 0–0.4)
EOSINOPHIL NFR BLD: 5 % (ref 0–7)
ERYTHROCYTE [DISTWIDTH] IN BLOOD BY AUTOMATED COUNT: 13.6 % (ref 11.5–14.5)
GLUCOSE BLD STRIP.AUTO-MCNC: 139 MG/DL (ref 65–117)
GLUCOSE BLD STRIP.AUTO-MCNC: 202 MG/DL (ref 65–117)
GLUCOSE BLD STRIP.AUTO-MCNC: 213 MG/DL (ref 65–117)
GLUCOSE BLD STRIP.AUTO-MCNC: 216 MG/DL (ref 65–117)
GLUCOSE SERPL-MCNC: 194 MG/DL (ref 65–100)
HCT VFR BLD AUTO: 36.7 % (ref 35–47)
HGB BLD-MCNC: 11.2 G/DL (ref 11.5–16)
IMM GRANULOCYTES # BLD AUTO: 0.1 K/UL (ref 0–0.04)
IMM GRANULOCYTES NFR BLD AUTO: 1 % (ref 0–0.5)
LYMPHOCYTES # BLD: 1.3 K/UL (ref 0.8–3.5)
LYMPHOCYTES NFR BLD: 15 % (ref 12–49)
MCH RBC QN AUTO: 28.1 PG (ref 26–34)
MCHC RBC AUTO-ENTMCNC: 30.5 G/DL (ref 30–36.5)
MCV RBC AUTO: 92.2 FL (ref 80–99)
MONOCYTES # BLD: 0.7 K/UL (ref 0–1)
MONOCYTES NFR BLD: 8 % (ref 5–13)
NEUTS SEG # BLD: 6.1 K/UL (ref 1.8–8)
NEUTS SEG NFR BLD: 70 % (ref 32–75)
NRBC # BLD: 0 K/UL (ref 0–0.01)
NRBC BLD-RTO: 0 PER 100 WBC
PLATELET # BLD AUTO: 226 K/UL (ref 150–400)
PMV BLD AUTO: 10.7 FL (ref 8.9–12.9)
POTASSIUM SERPL-SCNC: 4.2 MMOL/L (ref 3.5–5.1)
RBC # BLD AUTO: 3.98 M/UL (ref 3.8–5.2)
SERVICE CMNT-IMP: ABNORMAL
SODIUM SERPL-SCNC: 137 MMOL/L (ref 136–145)
VANCOMYCIN SERPL-MCNC: 13.7 UG/ML
WBC # BLD AUTO: 8.6 K/UL (ref 3.6–11)

## 2024-03-05 PROCEDURE — 2580000003 HC RX 258: Performed by: NURSE PRACTITIONER

## 2024-03-05 PROCEDURE — 80048 BASIC METABOLIC PNL TOTAL CA: CPT

## 2024-03-05 PROCEDURE — C9113 INJ PANTOPRAZOLE SODIUM, VIA: HCPCS | Performed by: NURSE PRACTITIONER

## 2024-03-05 PROCEDURE — 80202 ASSAY OF VANCOMYCIN: CPT

## 2024-03-05 PROCEDURE — 97162 PT EVAL MOD COMPLEX 30 MIN: CPT

## 2024-03-05 PROCEDURE — 82962 GLUCOSE BLOOD TEST: CPT

## 2024-03-05 PROCEDURE — 6360000002 HC RX W HCPCS: Performed by: STUDENT IN AN ORGANIZED HEALTH CARE EDUCATION/TRAINING PROGRAM

## 2024-03-05 PROCEDURE — 6370000000 HC RX 637 (ALT 250 FOR IP): Performed by: NURSE PRACTITIONER

## 2024-03-05 PROCEDURE — 97110 THERAPEUTIC EXERCISES: CPT

## 2024-03-05 PROCEDURE — 6370000000 HC RX 637 (ALT 250 FOR IP): Performed by: INTERNAL MEDICINE

## 2024-03-05 PROCEDURE — 2580000003 HC RX 258: Performed by: STUDENT IN AN ORGANIZED HEALTH CARE EDUCATION/TRAINING PROGRAM

## 2024-03-05 PROCEDURE — 6360000002 HC RX W HCPCS: Performed by: NURSE PRACTITIONER

## 2024-03-05 PROCEDURE — 36415 COLL VENOUS BLD VENIPUNCTURE: CPT

## 2024-03-05 PROCEDURE — 97165 OT EVAL LOW COMPLEX 30 MIN: CPT

## 2024-03-05 PROCEDURE — 2700000000 HC OXYGEN THERAPY PER DAY

## 2024-03-05 PROCEDURE — 94760 N-INVAS EAR/PLS OXIMETRY 1: CPT

## 2024-03-05 PROCEDURE — A4216 STERILE WATER/SALINE, 10 ML: HCPCS | Performed by: NURSE PRACTITIONER

## 2024-03-05 PROCEDURE — 1100000000 HC RM PRIVATE

## 2024-03-05 PROCEDURE — 85025 COMPLETE CBC W/AUTO DIFF WBC: CPT

## 2024-03-05 PROCEDURE — 97116 GAIT TRAINING THERAPY: CPT

## 2024-03-05 RX ORDER — POLYETHYLENE GLYCOL 3350 17 G/17G
17 POWDER, FOR SOLUTION ORAL DAILY PRN
Status: DISCONTINUED | OUTPATIENT
Start: 2024-03-05 | End: 2024-03-07 | Stop reason: HOSPADM

## 2024-03-05 RX ADMIN — CEFEPIME 2000 MG: 2 INJECTION, POWDER, FOR SOLUTION INTRAVENOUS at 02:04

## 2024-03-05 RX ADMIN — LOSARTAN POTASSIUM 50 MG: 25 TABLET, FILM COATED ORAL at 08:34

## 2024-03-05 RX ADMIN — PREGABALIN 50 MG: 25 CAPSULE ORAL at 21:13

## 2024-03-05 RX ADMIN — SODIUM CHLORIDE, PRESERVATIVE FREE 5 ML: 5 INJECTION INTRAVENOUS at 21:13

## 2024-03-05 RX ADMIN — INSULIN GLARGINE 14 UNITS: 100 INJECTION, SOLUTION SUBCUTANEOUS at 21:13

## 2024-03-05 RX ADMIN — CEFEPIME 2000 MG: 2 INJECTION, POWDER, FOR SOLUTION INTRAVENOUS at 14:13

## 2024-03-05 RX ADMIN — ALOGLIPTIN 6.25 MG: 6.25 TABLET, FILM COATED ORAL at 08:34

## 2024-03-05 RX ADMIN — INSULIN LISPRO 2 UNITS: 100 INJECTION, SOLUTION INTRAVENOUS; SUBCUTANEOUS at 17:48

## 2024-03-05 RX ADMIN — AMLODIPINE BESYLATE 10 MG: 5 TABLET ORAL at 08:34

## 2024-03-05 RX ADMIN — PREGABALIN 50 MG: 25 CAPSULE ORAL at 08:34

## 2024-03-05 RX ADMIN — PANTOPRAZOLE SODIUM 40 MG: 40 INJECTION, POWDER, FOR SOLUTION INTRAVENOUS at 08:35

## 2024-03-05 RX ADMIN — DOCUSATE SODIUM 50MG AND SENNOSIDES 8.6MG 2 TABLET: 8.6; 5 TABLET, FILM COATED ORAL at 08:34

## 2024-03-05 RX ADMIN — INSULIN LISPRO 2 UNITS: 100 INJECTION, SOLUTION INTRAVENOUS; SUBCUTANEOUS at 11:56

## 2024-03-05 RX ADMIN — VANCOMYCIN HYDROCHLORIDE 1000 MG: 1 INJECTION, POWDER, LYOPHILIZED, FOR SOLUTION INTRAVENOUS at 15:47

## 2024-03-05 RX ADMIN — ASPIRIN 81 MG: 81 TABLET, COATED ORAL at 08:34

## 2024-03-05 RX ADMIN — SODIUM CHLORIDE, PRESERVATIVE FREE 10 ML: 5 INJECTION INTRAVENOUS at 08:36

## 2024-03-05 RX ADMIN — POLYETHYLENE GLYCOL 3350 17 G: 17 POWDER, FOR SOLUTION ORAL at 08:34

## 2024-03-05 RX ADMIN — Medication 1 TABLET: at 08:34

## 2024-03-05 RX ADMIN — OXYCODONE 5 MG: 5 TABLET ORAL at 15:42

## 2024-03-05 RX ADMIN — ENOXAPARIN SODIUM 30 MG: 100 INJECTION SUBCUTANEOUS at 08:34

## 2024-03-05 RX ADMIN — ACETAMINOPHEN 650 MG: 325 TABLET ORAL at 02:14

## 2024-03-05 RX ADMIN — SODIUM CHLORIDE: 9 INJECTION, SOLUTION INTRAVENOUS at 02:03

## 2024-03-05 ASSESSMENT — PAIN SCALES - GENERAL
PAINLEVEL_OUTOF10: 0
PAINLEVEL_OUTOF10: 5

## 2024-03-05 ASSESSMENT — PAIN DESCRIPTION - DESCRIPTORS: DESCRIPTORS: ACHING

## 2024-03-05 ASSESSMENT — PAIN DESCRIPTION - LOCATION: LOCATION: GENERALIZED

## 2024-03-05 NOTE — PROGRESS NOTES
Orders received, chart reviewed and patient evaluated by occupational therapy. Pending progression with skilled acute occupational therapy, recommend:  Therapy up to 5 days/week in Skilled nursing facility or increased assistance at [facility] 24/7 if available.    Recommend with nursing patient to complete as able in order to maintain strength, endurance and independence: OOB to chair 3x/day, ADLs with supervision/setup and mobilizing to the bathroom for toileting with mod A x 1 to BSC assist. Thank you for your assistance.     Full evaluation to follow.    eJssy Veras OTR/L

## 2024-03-05 NOTE — PLAN OF CARE
Problem: Physical Therapy - Adult  Goal: By Discharge: Performs mobility at highest level of function for planned discharge setting.  See evaluation for individualized goals.  Description: FUNCTIONAL STATUS PRIOR TO ADMISSION: Patient was modified independent using a rollator for functional mobility.    HOME SUPPORT PRIOR TO ADMISSION: The patient lived alone with unknown level of support at [facility] to provide assistance.    Physical Therapy Goals  Initiated 3/5/2024  1.  Patient will move from supine to sit and sit to supine in bed with supervision/set-up within 7 day(s).    2.  Patient will perform sit to stand with supervision/set-up within 7 day(s).  3.  Patient will transfer from bed to chair and chair to bed with supervision/set-up using the least restrictive device within 7 day(s).  4.  Patient will ambulate with supervision/set-up for 200 feet with the least restrictive device within 7 day(s).   5.  Patient will ascend/descend 3-5 stairs with R/L handrail(s) with supervision/set-up within 7 day(s).    Outcome: Progressing   PHYSICAL THERAPY EVALUATION    Patient: Roxanne Hill (89 y.o. female)  Date: 3/5/2024  Primary Diagnosis: Cellulitis of left foot [L03.116]  Diabetic foot infection (HCC) [E11.628, L08.9]  Procedure(s) (LRB):  LEFT GREAT TOE AMPUTATION (Left) 3 Days Post-Op   Precautions: Restrictions/Precautions:  (falls, LLE WBAT with post op shoe, vascular insufficiency)                      ASSESSMENT :   DEFICITS/IMPAIRMENTS:   Pt tolerated PT services well. Pt confirms Abril with rollator at baseline and resides alone in an ILF. Most tasks performed with Faisal/modA including multiple functional sit <-> stand transfers, bed mobility, ambulation. Good static/dynamic sit balance observed to don sock and assist with post op shoe placement for OOB/GT activity. When ready, Pt ambulated with an unsteady step to gait with decreased step/stride, foot clearance. Therapist facilitated with steadying assist, AD  brian)  Number of Stairs Trained: 0                                                                                                                                                                                                                                                          [facility] AM-PAC®      Basic Mobility Inpatient Short Form (6-Clicks) Version 2  How much HELP from another person do you currently need... (If the patient hasn't done an activity recently, how much help from another person do you think they would need if they tried?) Total A Lot A Little None   1.  Turning from your back to your side while in a flat bed without using bedrails? []  1 []  2 [x]  3  []  4   2.  Moving from lying on your back to sitting on the side of a flat bed without using bedrails? []  1 []  2 [x]  3  []  4   3.  Moving to and from a bed to a chair (including a wheelchair)? []  1 [x]  2 []  3  []  4   4. Standing up from a chair using your arms (e.g. wheelchair or bedside chair)? []  1 [x]  2 []  3  []  4   5.  Walking in hospital room? []  1 [x]  2 []  3  []  4   6.  Climbing 3-5 steps with a railing? []  1 [x]  2 []  3  []  4     Raw Score: 14/24                            Cutoff score ?171,2,3 had higher odds of discharging home with home health or need of SNF/IPR.    1. Cari Byers, Karlene Byrne, Demarco White, Brittanie Valenzuela, Vineet Pendleton, Zachery Byers.  Validity of the AM-PAC “6-Clicks” Inpatient Daily Activity and Basic Mobility Short Forms. Physical Therapy Mar 2014, 94 3) 379-391; DOI: 10.2522/ptj.81966386  2. Porter JACKSON, Lolita CAMPUZANO, Kareem CAMPUZANO, Praveena CAMPUZANO. Association of AM-PAC \"6-Clicks\" Basic Mobility and Daily Activity Scores With Discharge Destination. Phys Ther. 2021 Apr 4;101(4):rxdl333. doi: 10.1093/ptj/jwwi437. PMID: 70029432.  3. Regina CAMPUZANO, Roman D, Mary S, Sampson K, Flex S. Activity Measure for Post-Acute Care \"6-Clicks\" Basic Mobility Scores Predict Discharge

## 2024-03-05 NOTE — PROGRESS NOTES
Hospitalist Progress Note    NAME:   Roxanne Hill   : 1923   MRN: [id number]     Date/Time: 3/5/2024 1:09 PM  Patient PCP: Fabi Courtney APRN - NP    Estimated discharge date: greater than 48 hours  Barriers: Awaiting final antibiotic and dressing change recommendations per podiatry. PT/OT recommend SNF at discharge. CM to follow for discharge planning.      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       History of osteoporosis  - preliminary blood cultures on 24 shows no growth at 5 days  - wound culture on 3/2/24 shows few mixed skin luz isolated  - anaerobic culture on 3/4/24 shows no anaerobes isolated  - surgical pathology shows :  Toe with cutaneous ulceration and underlying acute osteomyelitis  Surgical margin viable  - chest xray on 3/4/24 shows:  Shows no acute cardiopulmonary process  - left foot xray on 24 shows:  No acute abnormality  - continue current pain medications  - continue IV cefepime and vancomycin  - continue calcium with vitamin D  - dressing changed by podiatry on 3/4/24  - WBAT to left foot with post op shoe  - elevate left foot while at rest  - keep 02 sat greater than 92%  - incentive spirometry  - turn, cough and deep breathe  - prn nebulizer treatments  - awaiting final recommendations from podiatry regarding antibiotics  - podiatry following    2. Diabetes Mellitus  - Hemoglobin A1c 8.8  - accuchecks  - continue lantus and januvia  - continue sliding scale  - hold metformin  - diabetic diet  - hypoglycemia protocol     3. HTN      Hyperlipidemia  - continue aspirin, norvasc, losartan, amlodipine     4. GERD  - continue PPI    PT/OT    Medical Decision Making:   I personally reviewed labs: yes  I personally reviewed imaging: yes  I personally reviewed EKG: yes  Toxic drug monitoring: Lovenox, H/H  Discussed case with: patient, nursing, CM, Dr. Ayush Guzmán  Status:  Full Code  DVT Prophylaxis:  Lovenox  GI Prophylaxis:protonix    Subjective:     Chief Complaint / Reason for Physician Visit  \" I'm doing ok\"    Roxanne Hill is a 89 y.o.  female with PMHx significant for hypertension, diet-controlled hyperlipidemia, DM, PAD with L great toe ulceration, osteoporosis and GERD with CC of L great toe wound and pain. Patient recently seen at [facility] was started on IV Unasyn and discharge to complete 10 days of antibiotic treatment. Patient denies fever, chills, dizziness, appetite changes, headache, SOB, ADAN, CP, nausea, vomiting, dysuria, constipation and new onset weakness. Patient also recently underwent left lower extremity revascularization with atherectomy, balloon angioplasty, and stenting of the left SFA/popliteal/tibial arteries on February 16, 2024 with vascular surgery. Patient is from independent living facility and was sent here for further medical management . L foot Xray showed no acute abnormality. Patient receive a dose of Unasyn and Vancomycin in ED on 2/29/24 with Vascular surgery and podiatry consultation. Discussed with RN events overnight.       Objective:     VITALS:   Last 24hrs VS reviewed since prior progress note. Most recent are:  Patient Vitals for the past 24 hrs:   BP Temp Temp src Pulse Resp SpO2   03/05/24 0831 (!) 133/45 98.1 °F (36.7 °C) -- 73 18 97 %   03/05/24 0345 135/65 -- Oral 68 14 91 %   03/04/24 2011 -- -- -- -- 18 --   [birth year]/1935 (!) 152/60 98.1 °F (36.7 °C) Oral 88 16 95 %         Intake/Output Summary (Last 24 hours) at 3/5/2024 1309  Last data filed at 3/5/2024 0835  Gross per 24 hour   Intake 250 ml   Output 800 ml   Net -550 ml        I had a face to face encounter and independently examined this patient on 3/5/2024, as outlined below: Patient observed resting in chair with eyes opened. Denies complaints. No acute distress noted.  PHYSICAL EXAM:  General:          [place], Alert, cooperative  EENT:              EOMI.

## 2024-03-05 NOTE — PROGRESS NOTES
Reviewed the path report surgical margins are viable  Patient can be discharged with following instructions and recommendations    Follow up in my office in 2 weeks  Patient to be discharged with skilled wound care and PT/OT for rehab and ambulation assist and training   Home health to change bandage two times a week with Opticell Ag or similar and dry gauze loosely applied  Can be sen home with Oral Doxycycline 100 mg bid for 10 days  As tolerated weight bearing on the left foot with a post op shoe    If need further assistance from me please reach out to me on perfect serve or my cell [phone]

## 2024-03-05 NOTE — PLAN OF CARE
Problem: Occupational Therapy - Adult  Goal: By Discharge: Performs self-care activities at highest level of function for planned discharge setting.  See evaluation for individualized goals.  Description: FUNCTIONAL STATUS PRIOR TO ADMISSION:    Receives Help From:  (friends, neighbors; [facility]- 3 meals daily), ADL Assistance: Independent,  ,  ,  ,  ,  ,  , Ambulation Assistance: Independent (RW), Transfer Assistance: Independent, Active : No     HOME SUPPORT: [place] [facility]; takes all meals in dining so. Uses RW at all times; has walk in shower with bench in the shower with grab bars; toilet has a riser and grab bars; takes all meals in dining so \"not far away\" manages her own meds and finances PTA    Occupational Therapy Goals:  Initiated 3/5/2024  1.  Patient will perform grooming S in standing VSS with Minimal Assist within 7 day(s).  2.  Patient will perform upper body dressing with Stand by Assist within 7 day(s).  3.  Patient will perform lower body dressing with Contact Guard Assist within 7 day(s).  4.  Patient will perform toilet transfers with Contact Guard Assist  within 7 day(s).  5.  Patient will perform all aspects of toileting with Contact Guard Assist within 7 day(s).  6.  Patient will participate in upper extremity therapeutic exercise/activities with Supervision for 5 minutes within 7 day(s).    7.  Patient will utilize energy conservation techniques during functional activities with verbal cues within 7 day(s).   Outcome: Progressing   OCCUPATIONAL THERAPY EVALUATION    Patient: Roxanne Hill (89 y.o. female)  Date: 3/5/2024  Primary Diagnosis: Cellulitis of left foot [L03.116]  Diabetic foot infection (HCC) [E11.628, L08.9]  Procedure(s) (LRB):  LEFT GREAT TOE AMPUTATION (Left) 3 Days Post-Op     Precautions: Bed Alarm, General Precautions, Fall Risk (up with Post op shoe L LE WBAT)   pressure wound/debridement L heel               ASSESSMENT :  The patient  help, but does at least half of task within reasonable time  Bowel Control: Occasional accidents or needs help with device  Bladder Control: Cannot perform activity  Toilet Transfers: Needs help for balance, handling clothes or toilet paper  Chair/Bed Trannsfers: Able to sit, but needs maximum assistance to transfer  Ambulation: Cannot perform activity  Stairs: Cannot perform activity  Total Barthel Index Score: 30       The Barthel ADL Index: Guidelines  1. The index should be used as a record of what a patient does, not as a record of what a patient could do.  2. The main aim is to establish degree of independence from any help, physical or verbal, however minor and for whatever reason.  3. The need for supervision renders the patient not independent.  4. A patient's performance should be established using the best available evidence. Asking the patient, friends/relatives and nurses are the usual sources, but direct observation and common sense are also important. However direct testing is not needed.  5. Usually the patient's performance over the preceding 24-48 hours is important, but occasionally longer periods will be relevant.  6. Middle categories imply that the patient supplies over 50 per cent of the effort.  7. Use of aids to be independent is allowed.    Score Interpretation (from Hermelindo 1997)    Independent   60-79 Minimally independent   40-59 Partially dependent   20-39 Very dependent   <20 Totally dependent     -Allen Tay., Barthel, D.W. (1965). Functional evaluation: the Barthel Index. Md [facility] Med J 142.  -CAMMY Casarez, ARSLAN Pal (1997). The Barthel activities of daily living index: self-reporting versus actual performance in the old (> or = 75 years). Journal of American Geriatric Society 45(7), 832-836.   -ANGELIA Guallpa, AIDEN Knowles., Manas, JCandeA., Little, A.J. (1999). Measuring the change in disability after inpatient rehabilitation; comparison of the responsiveness of the

## 2024-03-05 NOTE — PROGRESS NOTES
Pharmacy Antimicrobial Kinetic Dosing    Indication for Antimicrobials: SSTI (LLE cellulitis/gangrene of left great toe/possible osteomyelitis)  S/P L great toe amputation to IPJ, L heel excisional debridement 3/02/24     Current Regimen of Each Antimicrobial:  Vancomycin Pharmacy to Dose; Start Date ; Day # 6  Cefepime 2g IV q12h; Start Date ; Day # 6    Previous Antimicrobial Therapy:  -Unasyn   -Ctx     Goal Level: Vancomycin -600    Date Dose & Interval Measured (mcg/mL) Predicted AUC   3/2 @ 0522 500 mg q 18h 11.2 331   3/5 @ 1401 1000 mg q18h 13.7 598           Significant Cultures:    Blood- NGTD, prelim     Labs:  Recent Labs     Units 24  0325 24  0315 24  0522   CREATININE MG/DL 0.71 0.83 0.79   BUN MG/DL 29* 26* 21*   WBC K/uL  --  7.5 5.2     Temp (24hrs), Av.8 °F (36.6 °C), Min:97.5 °F (36.4 °C), Max:97.9 °F (36.6 °C)    Conditions for Dosing Consideration: None    Creatinine Clearance (mL/min): Estimated Creatinine Clearance: 36 mL/min (based on SCr of 0.71 mg/dL).     Impression/Plan:   Vancomycin , continue current regimen  Cefepime dosing is okay  BMP daily  Antimicrobial stop date pending     Pharmacy will follow daily and adjust medications as appropriate for renal function and/or serum levels.    Thank you,  López Fuentes, [facility], located within [facility]

## 2024-03-05 NOTE — PROGRESS NOTES
End of Shift Note    Bedside shift change report given to Tiffany YANEZ (oncoming nurse) by Ger Carmona RN (offgoing nurse).  Report included the following information SBAR, Kardex, Intake/Output, MAR, Recent Results, and Cardiac Rhythm AFib    Shift worked:  7p-7a     Shift summary and any significant changes:     Patient tolerated care. Scheduled meds given and PRN Dilaudid and tylenol given for pain. CHG bath and incontinent care provided. Hair care also provided. Caring round. Labs drawn.       Length of Stay:  Expected LOS: 6  Actual LOS: 5      Ger Carmona RN

## 2024-03-05 NOTE — CARE COORDINATION
Transition of Care Plan:    RUR: 15%  Prior Level of Functioning: Independent   Disposition: [facility] Vs. SNF  If SNF or IPR: Date FOC offered:   Date FOC received:   Accepting facility:   Date authorization started with reference number:   Date authorization received and expires:   Follow up appointments: Pending on recommendation regarding if pt needs SNF placement   DME needed: Pending on recommendation regarding if pt needs SNF placement   Transportation at discharge: Pt will need transportation   IM/IMM Medicare/ letter given: Needs to be given   Is patient a  and connected with VA? No   If yes, was [place] transfer form completed and VA notified? No  Caregiver Contact: Disha Grover  Discharge Caregiver contacted prior to discharge? Pt   Care Conference needed? No  Barriers to discharge: Pod clearance, Medically stable        CM left a message for Jeri Hurd at [facility] ([phone]) regarding pt's care.    Pt may have the potential of needing IV ABX pending final pathology report, and regarding the wound care.    CM will follow and assist with d/c planning.     Gelnna Iverson

## 2024-03-06 LAB
BACTERIA SPEC CULT: NORMAL
BACTERIA SPEC CULT: NORMAL
GLUCOSE BLD STRIP.AUTO-MCNC: 131 MG/DL (ref 65–117)
GLUCOSE BLD STRIP.AUTO-MCNC: 179 MG/DL (ref 65–117)
GLUCOSE BLD STRIP.AUTO-MCNC: 180 MG/DL (ref 65–117)
GLUCOSE BLD STRIP.AUTO-MCNC: 253 MG/DL (ref 65–117)
SERVICE CMNT-IMP: ABNORMAL
SERVICE CMNT-IMP: NORMAL
SERVICE CMNT-IMP: NORMAL

## 2024-03-06 PROCEDURE — 82962 GLUCOSE BLOOD TEST: CPT

## 2024-03-06 PROCEDURE — 2580000003 HC RX 258: Performed by: NURSE PRACTITIONER

## 2024-03-06 PROCEDURE — 97110 THERAPEUTIC EXERCISES: CPT

## 2024-03-06 PROCEDURE — 6370000000 HC RX 637 (ALT 250 FOR IP): Performed by: NURSE PRACTITIONER

## 2024-03-06 PROCEDURE — 97535 SELF CARE MNGMENT TRAINING: CPT

## 2024-03-06 PROCEDURE — 1100000000 HC RM PRIVATE

## 2024-03-06 PROCEDURE — 2580000003 HC RX 258: Performed by: STUDENT IN AN ORGANIZED HEALTH CARE EDUCATION/TRAINING PROGRAM

## 2024-03-06 PROCEDURE — 6360000002 HC RX W HCPCS: Performed by: NURSE PRACTITIONER

## 2024-03-06 PROCEDURE — 6370000000 HC RX 637 (ALT 250 FOR IP): Performed by: INTERNAL MEDICINE

## 2024-03-06 PROCEDURE — C9113 INJ PANTOPRAZOLE SODIUM, VIA: HCPCS | Performed by: NURSE PRACTITIONER

## 2024-03-06 PROCEDURE — A4216 STERILE WATER/SALINE, 10 ML: HCPCS | Performed by: NURSE PRACTITIONER

## 2024-03-06 PROCEDURE — 6360000002 HC RX W HCPCS: Performed by: STUDENT IN AN ORGANIZED HEALTH CARE EDUCATION/TRAINING PROGRAM

## 2024-03-06 PROCEDURE — 97116 GAIT TRAINING THERAPY: CPT

## 2024-03-06 RX ORDER — DOXYCYCLINE HYCLATE 100 MG
100 TABLET ORAL EVERY 12 HOURS SCHEDULED
Status: DISCONTINUED | OUTPATIENT
Start: 2024-03-07 | End: 2024-03-07 | Stop reason: HOSPADM

## 2024-03-06 RX ORDER — DOXYCYCLINE HYCLATE 100 MG
100 TABLET ORAL 2 TIMES DAILY
Qty: 20 TABLET | Refills: 0 | Status: SHIPPED | OUTPATIENT
Start: 2024-03-06 | End: 2024-03-16

## 2024-03-06 RX ORDER — INSULIN LISPRO 100 [IU]/ML
4 INJECTION, SOLUTION INTRAVENOUS; SUBCUTANEOUS
Status: DISCONTINUED | OUTPATIENT
Start: 2024-03-06 | End: 2024-03-07 | Stop reason: HOSPADM

## 2024-03-06 RX ADMIN — LOSARTAN POTASSIUM 50 MG: 25 TABLET, FILM COATED ORAL at 10:00

## 2024-03-06 RX ADMIN — ASPIRIN 81 MG: 81 TABLET, COATED ORAL at 10:00

## 2024-03-06 RX ADMIN — SODIUM CHLORIDE, PRESERVATIVE FREE 10 ML: 5 INJECTION INTRAVENOUS at 10:03

## 2024-03-06 RX ADMIN — INSULIN GLARGINE 14 UNITS: 100 INJECTION, SOLUTION SUBCUTANEOUS at 21:08

## 2024-03-06 RX ADMIN — SODIUM CHLORIDE, PRESERVATIVE FREE 5 ML: 5 INJECTION INTRAVENOUS at 21:08

## 2024-03-06 RX ADMIN — SODIUM CHLORIDE 30 ML: 9 INJECTION, SOLUTION INTRAVENOUS at 16:00

## 2024-03-06 RX ADMIN — ALOGLIPTIN 6.25 MG: 6.25 TABLET, FILM COATED ORAL at 10:00

## 2024-03-06 RX ADMIN — Medication 1 TABLET: at 10:00

## 2024-03-06 RX ADMIN — CEFEPIME 2000 MG: 2 INJECTION, POWDER, FOR SOLUTION INTRAVENOUS at 02:31

## 2024-03-06 RX ADMIN — PREGABALIN 50 MG: 25 CAPSULE ORAL at 10:00

## 2024-03-06 RX ADMIN — AMLODIPINE BESYLATE 10 MG: 5 TABLET ORAL at 10:00

## 2024-03-06 RX ADMIN — VANCOMYCIN HYDROCHLORIDE 1000 MG: 1 INJECTION, POWDER, LYOPHILIZED, FOR SOLUTION INTRAVENOUS at 10:22

## 2024-03-06 RX ADMIN — CEFEPIME 2000 MG: 2 INJECTION, POWDER, FOR SOLUTION INTRAVENOUS at 16:02

## 2024-03-06 RX ADMIN — DOCUSATE SODIUM 50MG AND SENNOSIDES 8.6MG 2 TABLET: 8.6; 5 TABLET, FILM COATED ORAL at 10:00

## 2024-03-06 RX ADMIN — ENOXAPARIN SODIUM 30 MG: 100 INJECTION SUBCUTANEOUS at 10:01

## 2024-03-06 RX ADMIN — INSULIN LISPRO 4 UNITS: 100 INJECTION, SOLUTION INTRAVENOUS; SUBCUTANEOUS at 11:24

## 2024-03-06 RX ADMIN — PANTOPRAZOLE SODIUM 40 MG: 40 INJECTION, POWDER, FOR SOLUTION INTRAVENOUS at 10:00

## 2024-03-06 RX ADMIN — PREGABALIN 50 MG: 25 CAPSULE ORAL at 21:08

## 2024-03-06 RX ADMIN — SODIUM CHLORIDE: 9 INJECTION, SOLUTION INTRAVENOUS at 10:22

## 2024-03-06 RX ADMIN — INSULIN LISPRO 4 UNITS: 100 INJECTION, SOLUTION INTRAVENOUS; SUBCUTANEOUS at 16:37

## 2024-03-06 ASSESSMENT — PAIN SCALES - GENERAL
PAINLEVEL_OUTOF10: 0

## 2024-03-06 NOTE — PLAN OF CARE
Problem: Occupational Therapy - Adult  Goal: By Discharge: Performs self-care activities at highest level of function for planned discharge setting.  See evaluation for individualized goals.  Description: FUNCTIONAL STATUS PRIOR TO ADMISSION:    Receives Help From:  (friends, neighbors; [facility]- 3 meals daily), ADL Assistance: Independent,  ,  ,  ,  ,  ,  , Ambulation Assistance: Independent (RW), Transfer Assistance: Independent, Active : No     HOME SUPPORT: [place] [facility]; takes all meals in dining so.    Occupational Therapy Goals:  Initiated 3/5/2024  1.  Patient will perform grooming S in standing VSS with Minimal Assist within 7 day(s).  2.  Patient will perform upper body dressing with Stand by Assist within 7 day(s).  3.  Patient will perform lower body dressing with Contact Guard Assist within 7 day(s).  4.  Patient will perform toilet transfers with Contact Guard Assist  within 7 day(s).  5.  Patient will perform all aspects of toileting with Contact Guard Assist within 7 day(s).  6.  Patient will participate in upper extremity therapeutic exercise/activities with Supervision for 5 minutes within 7 day(s).    7.  Patient will utilize energy conservation techniques during functional activities with verbal cues within 7 day(s).   Outcome: Progressing   OCCUPATIONAL THERAPY TREATMENT  Patient: Roxanne Hill (89 y.o. female)  Date: 3/6/2024  Primary Diagnosis: Cellulitis of left foot [L03.116]  Diabetic foot infection (HCC) [E11.628, L08.9]  Procedure(s) (LRB):  LEFT GREAT TOE AMPUTATION (Left) 4 Days Post-Op   Precautions: Fall Risk, General Precautions, Bed Alarm (LLE WBAT with post op shoe; impaired safety awareness!)                Chart, occupational therapy assessment, plan of care, and goals were reviewed.    ASSESSMENT  Patient continues to benefit from skilled OT services and is progressing towards goals. Foul smelling urine noted with toileting; incontinent  awareness of errors;Assistance required to identify errors made;Assistance required to generate solutions;Assistance required to implement solutions;Assistance required to correct errors made  Insights: Decreased awareness of deficits  Initiation: Requires cues for some  Sequencing: Requires cues for some  Cognition Comment: needs cog screen; may have UTI (foul smelling urine) May be time to advance from ILF to higher level of care if post op cognition does not improve; patient does not recognize deficits at ALL    Functional Mobility and Transfers for ADLs:  Bed Mobility:  Bed Mobility Training  Bed Mobility Training: Yes  Overall Level of Assistance: Minimum assistance;Additional time  Interventions: Demonstration;Manual cues;Safety awareness training;Tactile cues;Verbal cues  Rolling: Adaptive equipment;Additional time  Supine to Sit: Minimum assistance;Additional time;Adaptive equipment  Sit to Supine: Minimum assistance;Adaptive equipment;Additional time  Scooting: Minimum assistance;Adaptive equipment;Additional time     Transfers:   Transfer Training  Transfer Training: Yes  Overall Level of Assistance: Moderate assistance;Adaptive equipment;Additional time  Interventions: Manual cues;Demonstration;Safety awareness training;Tactile cues;Verbal cues;Visual cues  Sit to Stand: Moderate assistance;Adaptive equipment;Additional time;Assist X1  Stand to Sit: Moderate assistance;Assist X1;Adaptive equipment;Additional time  Bed to Chair: Moderate assistance;Assist X1;Adaptive equipment;Additional time  Toilet Transfer: Moderate assistance;Maximum assistance;Additional time;Adaptive equipment (skills declined with focus on need for BM)           Balance:  Standing: Impaired  Balance  Sitting: Impaired  Sitting - Static:  (good minus)  Sitting - Dynamic:  (fair plus/good minus)  Standing: Impaired  Standing - Static: Constant support;Fair  Standing - Dynamic: Constant support;Fair;Poor (unable to manage clothing in

## 2024-03-06 NOTE — CARE COORDINATION
Transition of Care Plan:     RUR: 15%  Prior Level of Functioning: Independent   Disposition: SNF-referral sent   If SNF or IPR: Date FOC offered:   Date FOC received:   Accepting facility:   Date authorization started with reference number:   Date authorization received and expires:   Follow up appointments: Pending on recommendation regarding if pt needs SNF placement   DME needed: Pending on recommendation regarding if pt needs SNF placement   Transportation at discharge: BLS   IM/IMM Medicare/ letter given: provided  Is patient a [place] and connected with VA? No              If yes, was  transfer form completed and VA notified? No  Caregiver Contact: Disha Grover  Discharge Caregiver contacted prior to discharge? Pt   Care Conference needed? No  Barriers to discharge: Pod clearance, Medically stable    4:16 p.m. CM followed up with pt to see if she had a chance to review the SNF list.  Pt reported she wants referral sent to [facility]  CM will send.     1:38 p.m. Pt's OT approached CM stating pt's needs have drastically increased.  CM called Jeri to verify they can accommodate pt's needs at the [facility] and after speaking with the OT directly, Jeri stated pt needs to go to SNF prior to returning.  CM met with pt to inform and provide list for SNF.  CM canceled transportation.     10:55 a.m. CM arranged transportation through [facility] for 3 p.m.   PCS on chart.  [facility]  CM placed on AVS.  CM made follow up appt with Dr. Alexander.     10:48 a.m. CM met with pt at bedside to discuss d/c.  2IM reviewed, signed and copy placed on chart.  CM offered to call pt's family and pt declined.     10:38 a.m. HAVEN spoke with Jeri of the [facility] who can accept pt back today.  HAVEN made Jeri aware of dressing changes.  Jeri stated they typically use [facility] for HH.  Pt in agreement with HH with no preference.  CM will send order.   Jeri requested a 3 p.m. transport time.      CM met with pt at bedside to discuss therapy's recommendation of SNF at d/c.  Pt desires to return home at the [facility] with HH.  CM  left a message for Jeri Zaidi at [facility] ([phone]).       03/06/24 1056   Services At/After Discharge   Transition of Care Consult (CM Consult) Discharge Planning   Services At/After Discharge Assisted living  ([facility])   Mode of Transport at Discharge BLS  (3 p.m. [facility])   Condition of Participation: Discharge Planning   The Patient and/or Patient Representative was provided with a Choice of Provider? Patient   The Patient and/Or Patient Representative agree with the Discharge Plan? Yes   Freedom of Choice list was provided with basic dialogue that supports the patient's individualized plan of care/goals, treatment preferences, and shares the quality data associated with the providers?  Yes         Yulissa Babin LMSW  Supervisee in Social Work  Care Management, [facility]  [phone]

## 2024-03-06 NOTE — PLAN OF CARE
Problem: Physical Therapy - Adult  Goal: By Discharge: Performs mobility at highest level of function for planned discharge setting.  See evaluation for individualized goals.  Description: FUNCTIONAL STATUS PRIOR TO ADMISSION: Patient was modified independent using a rollator for functional mobility.    HOME SUPPORT PRIOR TO ADMISSION: The patient lived alone with unknown level of support at [facility] to provide assistance.    Physical Therapy Goals  Initiated 3/5/2024  1.  Patient will move from supine to sit and sit to supine in bed with supervision/set-up within 7 day(s).    2.  Patient will perform sit to stand with supervision/set-up within 7 day(s).  3.  Patient will transfer from bed to chair and chair to bed with supervision/set-up using the least restrictive device within 7 day(s).  4.  Patient will ambulate with supervision/set-up for 200 feet with the least restrictive device within 7 day(s).   5.  Patient will ascend/descend 3-5 stairs with R/L handrail(s) with supervision/set-up within 7 day(s).    Outcome: Progressing   PHYSICAL THERAPY TREATMENT    Patient: Roxanne Hill (89 y.o. female)  Date: 3/6/2024  Diagnosis: Cellulitis of left foot [L03.116]  Diabetic foot infection (HCC) [E11.628, L08.9] Cellulitis of left foot  Procedure(s) (LRB):  LEFT GREAT TOE AMPUTATION (Left) 4 Days Post-Op  Precautions:  (LLE WBAT with post op shoe)                      ASSESSMENT:  Pt tolerated PT services well. Therapist reintroduced self/role from the day prior and Pt amenable to therapy services. Increased modA hands on assist required t/o session with noted tendency for posteriorly directed lean once in stand. Repeat tactile/vcs for erect stand/posture with limited carryover. TotalA to don post op shoe prior to OOB activity. When ready, Pt ambulated in the room with an unsteady step to gait. Therapist facilitated with steadying assist, AD advance and cues for safety. Seated rest prior to consecutive sit <-> stand

## 2024-03-06 NOTE — PROGRESS NOTES
End of Shift Note    Bedside shift change report given to RENATA Nieto (oncoming nurse) by Tish Turner RN (offgoing nurse).  Report included the following information SBAR, Kardex, Intake/Output, and MAR    Shift worked:  [id number]     Shift summary and any significant changes:     Patient tolerated care fairly well, no complaints of pain.  All scheduled meds incontinent care, wound care, and frequent rounding provided.  Patient transferred to recliner in the afternoon with heavy 1 assist and walker.  Pt transferred to [facility] [place] with 2 assist.     Concerns for physician to address:       Zone phone for oncoming shift:              Tish Turner RN

## 2024-03-06 NOTE — DISCHARGE INSTRUCTIONS
Please call 911 for chest pain, shortness of breath, dizziness or distress.  HH SN for wound care to left foot:  Change bandage to left foot 2 times a week on Tuesday and Thursday. Clean left foot with NS and pat dry. Apply opticell AG or similar and dry non-adherent rolled- gauze loosely applied.  May ambulate as tolerated with left post op shoe and assistance. Keep left foot elevated while at rest.

## 2024-03-07 VITALS
OXYGEN SATURATION: 94 % | BODY MASS INDEX: 22.73 KG/M2 | RESPIRATION RATE: 16 BRPM | HEART RATE: 69 BPM | WEIGHT: 133.16 LBS | TEMPERATURE: 98.1 F | SYSTOLIC BLOOD PRESSURE: 154 MMHG | HEIGHT: 64 IN | DIASTOLIC BLOOD PRESSURE: 55 MMHG

## 2024-03-07 LAB
ANION GAP SERPL CALC-SCNC: 2 MMOL/L (ref 5–15)
BASOPHILS # BLD: 0.1 K/UL (ref 0–0.1)
BASOPHILS NFR BLD: 1 % (ref 0–1)
BUN SERPL-MCNC: 20 MG/DL (ref 6–20)
BUN/CREAT SERPL: 27 (ref 12–20)
CALCIUM SERPL-MCNC: 8.7 MG/DL (ref 8.5–10.1)
CHLORIDE SERPL-SCNC: 112 MMOL/L (ref 97–108)
CO2 SERPL-SCNC: 24 MMOL/L (ref 21–32)
CREAT SERPL-MCNC: 0.73 MG/DL (ref 0.55–1.02)
DIFFERENTIAL METHOD BLD: ABNORMAL
EOSINOPHIL # BLD: 0.5 K/UL (ref 0–0.4)
EOSINOPHIL NFR BLD: 8 % (ref 0–7)
ERYTHROCYTE [DISTWIDTH] IN BLOOD BY AUTOMATED COUNT: 13.8 % (ref 11.5–14.5)
GLUCOSE BLD STRIP.AUTO-MCNC: 109 MG/DL (ref 65–117)
GLUCOSE BLD STRIP.AUTO-MCNC: 212 MG/DL (ref 65–117)
GLUCOSE SERPL-MCNC: 103 MG/DL (ref 65–100)
HCT VFR BLD AUTO: 40.8 % (ref 35–47)
HGB BLD-MCNC: 12 G/DL (ref 11.5–16)
IMM GRANULOCYTES # BLD AUTO: 0.1 K/UL (ref 0–0.04)
IMM GRANULOCYTES NFR BLD AUTO: 1 % (ref 0–0.5)
LYMPHOCYTES # BLD: 1.3 K/UL (ref 0.8–3.5)
LYMPHOCYTES NFR BLD: 20 % (ref 12–49)
MCH RBC QN AUTO: 27.4 PG (ref 26–34)
MCHC RBC AUTO-ENTMCNC: 29.4 G/DL (ref 30–36.5)
MCV RBC AUTO: 93.2 FL (ref 80–99)
MONOCYTES # BLD: 0.6 K/UL (ref 0–1)
MONOCYTES NFR BLD: 9 % (ref 5–13)
NEUTS SEG # BLD: 4.2 K/UL (ref 1.8–8)
NEUTS SEG NFR BLD: 61 % (ref 32–75)
NRBC # BLD: 0 K/UL (ref 0–0.01)
NRBC BLD-RTO: 0 PER 100 WBC
PLATELET # BLD AUTO: 208 K/UL (ref 150–400)
PMV BLD AUTO: 10.7 FL (ref 8.9–12.9)
POTASSIUM SERPL-SCNC: 4.3 MMOL/L (ref 3.5–5.1)
RBC # BLD AUTO: 4.38 M/UL (ref 3.8–5.2)
SERVICE CMNT-IMP: ABNORMAL
SERVICE CMNT-IMP: NORMAL
SODIUM SERPL-SCNC: 138 MMOL/L (ref 136–145)
WBC # BLD AUTO: 6.8 K/UL (ref 3.6–11)

## 2024-03-07 PROCEDURE — 6360000002 HC RX W HCPCS: Performed by: NURSE PRACTITIONER

## 2024-03-07 PROCEDURE — 36415 COLL VENOUS BLD VENIPUNCTURE: CPT

## 2024-03-07 PROCEDURE — 97535 SELF CARE MNGMENT TRAINING: CPT

## 2024-03-07 PROCEDURE — 97110 THERAPEUTIC EXERCISES: CPT

## 2024-03-07 PROCEDURE — 6370000000 HC RX 637 (ALT 250 FOR IP): Performed by: INTERNAL MEDICINE

## 2024-03-07 PROCEDURE — A4216 STERILE WATER/SALINE, 10 ML: HCPCS | Performed by: NURSE PRACTITIONER

## 2024-03-07 PROCEDURE — 82962 GLUCOSE BLOOD TEST: CPT

## 2024-03-07 PROCEDURE — 85025 COMPLETE CBC W/AUTO DIFF WBC: CPT

## 2024-03-07 PROCEDURE — 80048 BASIC METABOLIC PNL TOTAL CA: CPT

## 2024-03-07 PROCEDURE — 97116 GAIT TRAINING THERAPY: CPT

## 2024-03-07 PROCEDURE — 6370000000 HC RX 637 (ALT 250 FOR IP): Performed by: NURSE PRACTITIONER

## 2024-03-07 PROCEDURE — C9113 INJ PANTOPRAZOLE SODIUM, VIA: HCPCS | Performed by: NURSE PRACTITIONER

## 2024-03-07 PROCEDURE — 2580000003 HC RX 258: Performed by: NURSE PRACTITIONER

## 2024-03-07 RX ADMIN — SODIUM CHLORIDE, PRESERVATIVE FREE 10 ML: 5 INJECTION INTRAVENOUS at 10:16

## 2024-03-07 RX ADMIN — Medication 1 TABLET: at 10:15

## 2024-03-07 RX ADMIN — PREGABALIN 50 MG: 25 CAPSULE ORAL at 10:14

## 2024-03-07 RX ADMIN — AMLODIPINE BESYLATE 10 MG: 5 TABLET ORAL at 10:14

## 2024-03-07 RX ADMIN — INSULIN LISPRO 2 UNITS: 100 INJECTION, SOLUTION INTRAVENOUS; SUBCUTANEOUS at 11:43

## 2024-03-07 RX ADMIN — ENOXAPARIN SODIUM 30 MG: 100 INJECTION SUBCUTANEOUS at 10:15

## 2024-03-07 RX ADMIN — DOXYCYCLINE HYCLATE 100 MG: 100 TABLET, COATED ORAL at 10:14

## 2024-03-07 RX ADMIN — DOCUSATE SODIUM 50MG AND SENNOSIDES 8.6MG 2 TABLET: 8.6; 5 TABLET, FILM COATED ORAL at 10:14

## 2024-03-07 RX ADMIN — LOSARTAN POTASSIUM 50 MG: 25 TABLET, FILM COATED ORAL at 10:15

## 2024-03-07 RX ADMIN — ASPIRIN 81 MG: 81 TABLET, COATED ORAL at 10:14

## 2024-03-07 RX ADMIN — PANTOPRAZOLE SODIUM 40 MG: 40 INJECTION, POWDER, FOR SOLUTION INTRAVENOUS at 10:15

## 2024-03-07 RX ADMIN — ALOGLIPTIN 6.25 MG: 6.25 TABLET, FILM COATED ORAL at 10:14

## 2024-03-07 RX ADMIN — INSULIN LISPRO 4 UNITS: 100 INJECTION, SOLUTION INTRAVENOUS; SUBCUTANEOUS at 11:43

## 2024-03-07 ASSESSMENT — PAIN SCALES - GENERAL: PAINLEVEL_OUTOF10: 0

## 2024-03-07 NOTE — CARE COORDINATION
Pt is clear from CM standpoint for d/c.    Report number to [facility] is [phone]    Going to room 201 A.    [facility] to transport at 2 pm.    Transition of Care Plan:     RUR: 14%  Prior Level of Functioning: Independent   Disposition: SNF-[facility]   If SNF or IPR: Date FOC offered: 3/7/24  Date FOC received: 3/8/24  Accepting facility: [facility]   Date authorization started with reference number:   Date authorization received and expires:   Follow up appointments: Defer to facility   DME needed: Defer to facility   Transportation at discharge: [facility] at 2 pm  IM/IMM Medicare/ letter given: provided on 3/6/24  Is patient a [place] and connected with VA? No              If yes, was [place] transfer form completed and VA notified? No  Caregiver Contact: Disha Grover  Discharge Caregiver contacted prior to discharge? Pt was contacted   Care Conference needed? No  Barriers to discharge: None          03/07/24 1039   Services At/After Discharge   Transition of Care Consult (CM Consult) SNF   Services At/After Discharge Skilled Nursing Facility (SNF)    Resource Information Provided? No   Mode of Transport at Discharge BLS   Confirm Follow Up Transport Self   Condition of Participation: Discharge Planning   The Plan for Transition of Care is related to the following treatment goals: Goal is for pt to be d/c to [facility] for skilled services before returning to [facility]   The Patient and/or Patient Representative was provided with a Choice of Provider? Patient   The Patient and/Or Patient Representative agree with the Discharge Plan? Yes   Freedom of Choice list was provided with basic dialogue that supports the patient's individualized plan of care/goals, treatment preferences, and shares the quality data associated with the providers?  Yes     Transition of Care Plan to SNF/Rehab    Communication to Patient/Family:  Met with patient and family and they are agreeable to the

## 2024-03-07 NOTE — PLAN OF CARE
Problem: Physical Therapy - Adult  Goal: By Discharge: Performs mobility at highest level of function for planned discharge setting.  See evaluation for individualized goals.  Description: FUNCTIONAL STATUS PRIOR TO ADMISSION: Patient was modified independent using a rollator for functional mobility.    HOME SUPPORT PRIOR TO ADMISSION: The patient lived alone with unknown level of support at [facility] to provide assistance.    Physical Therapy Goals  Initiated 3/5/2024  1.  Patient will move from supine to sit and sit to supine in bed with supervision/set-up within 7 day(s).    2.  Patient will perform sit to stand with supervision/set-up within 7 day(s).  3.  Patient will transfer from bed to chair and chair to bed with supervision/set-up using the least restrictive device within 7 day(s).  4.  Patient will ambulate with supervision/set-up for 200 feet with the least restrictive device within 7 day(s).   5.  Patient will ascend/descend 3-5 stairs with R/L handrail(s) with supervision/set-up within 7 day(s).    Outcome: Progressing   PHYSICAL THERAPY TREATMENT    Patient: Roxanne Hill (89 y.o. female)  Date: 3/7/2024  Diagnosis: Cellulitis of left foot [L03.116]  Diabetic foot infection (HCC) [E11.628, L08.9] Cellulitis of left foot  Procedure(s) (LRB):  LEFT GREAT TOE AMPUTATION (Left) 5 Days Post-Op  Precautions:  (falls, LLE WBAT with post op shoe)                      ASSESSMENT:  Pt tolerated PT services well and continues to demo good progress toward PT POC goals. Pt was received in bedside chair s/p lunch. Therapist reintroduced self/role and Pt amenable to therapy services. Pt confirms pending dc to rehab later, but indicated goal to try to walk a little. With increased time/effort, most tasks performed with Faisal including multiple functional transfers, ambulation ~50ft with RW. Improved safety with push off/reach back prior to sit. Deferral of additional attempted activity per Pt report of fatigue/pain onset.  acceptable to the patient    Activity Tolerance:   Good    After treatment:   Patient left in no apparent distress sitting up in chair, Call bell within reach, Bed/ chair alarm activated, and Spiritual Team present providing services when therapist departed.  BLE elevated for comfort and pain/edema reduction.      COMMUNICATION/EDUCATION:   The patient's plan of care was discussed with: registered nurse    Patient Education  Education Given To: Patient  Education Provided: Transfer Training;Role of Therapy;Equipment;Plan of Care;Energy Conservation;Fall Prevention Strategies;Orientation;Precautions  Education Method: Demonstration;Verbal  Barriers to Learning: None  Education Outcome: Verbalized understanding;Demonstrated understanding;Continued education needed      Reyna Monk, PT  Minutes: 31

## 2024-03-07 NOTE — DISCHARGE SUMMARY
Discharge Summary    Name: oRxanne Hill  [id number]  YOB: 1923 (Age: 89 y.o.)   Date of Admission: 2/29/2024  Date of Discharge: 3/7/2024  Attending Physician: Waldemar Claire MD    Discharge Diagnosis: Infection of left heel and left great toe wound, PAD, Status post left great toe amputation to [facility] 3/2/24.    Other subsequent diagnosis: History of osteoporosis, diabetes mellitus, HTN, hyperlipidemia, and GERD.    Consultations:  IP CONSULT TO VASCULAR SURGERY  IP CONSULT TO PODIATRY  IP CONSULT TO CASE MANAGEMENT      Brief Admission History/Reason for Admission  Brief Hospital Course by Main Problems:    Ms. Roxanne Hill is a 89 y.o.  female with past medical history significant for hypertension, diet-controlled hyperlipidemia, DM, PAD with left great toe ulceration, osteoporosis and GERD with complaints of L great toe wound and pain. Patient recently seen at [facility] was started on IV Unasyn and discharge to complete 10 days of antibiotic treatment.     Patient also recently underwent left lower extremity revascularization with atherectomy, balloon angioplasty, and stenting of the left SFA/popliteal/tibial arteries on February 16, 2024 with vascular surgery. Patient is from independent living facility and was sent here for further medical management on 2/29/24. L foot Xray showed no acute abnormality. Patient receive a dose of Unasyn and Vancomycin in ED on 2/29/24 with Vascular surgery and podiatry consultation.     Final blood cultures on 2/29/24 shows no growth. Wound culture on 3/2/24 shows few mixed skin luz isolated. Anaerobic culture on 3/4/24 shows no anaerobes isolated. Surgical pathology shows toe with cutaneous ulceration and underlying osteomyelitis. Surgical margin viable.    Chest xray on 3/4/24 shows no acute cardiopulmonary process. Left foot xray on 2/28/24 shows no acute abnormality.  Dressing changed by nursing on 3/6/24  and podiatry on 3/4/24. No drainage noted. WBAT to left foot with post op shoe. Elevate left foot while at rest.     Change bandage two times a week as instructed, last changed on 3/6/24. Patient to be discharged on doxycycline for a total of 10 days of therapy. Patient is in stable condition on today and cleared for discharge from podiatry and vascular surgery standpoint with outpatient follow up.    Discharge Exam:  Patient seen and examined by me on discharge day.  Pertinent Findings:  Patient Vitals for the past 24 hrs:   BP Temp Temp src Pulse Resp SpO2   03/07/24 0748 (!) 154/55 98.1 °F (36.7 °C) Oral 69 -- 94 %   03/06/24 2100 (!) 146/54 98.1 °F (36.7 °C) Oral 73 16 95 %   03/06/24 1145 -- -- -- -- -- 98 %       Gen:    Not in distress  Chest: Clear lungs  CVS:   Regular rhythm.  No edema  Abd:  Soft, not distended, not tender. Last bowel movement on 3/6/24  Neuro: awake, moving all exts  Skin: Ecchymosis present to bilateral upper and lower extremities. Dressing to left foot is clean, dry and intact.     Discharge/Recent Laboratory Results:  Recent Labs     03/07/24  0621      K 4.3   *   CO2 24   BUN 20   CREATININE 0.73   GLUCOSE 103*   CALCIUM 8.7     Recent Labs     03/07/24  0621   HGB 12.0   HCT 40.8   WBC 6.8          Discharge Medications:     Medication List        START taking these medications      doxycycline hyclate 100 MG tablet  Commonly known as: VIBRA-TABS  Take 1 tablet by mouth 2 times daily for 10 days            CONTINUE taking these medications      acetaminophen 500 MG tablet  Commonly known as: TYLENOL     amLODIPine 10 MG tablet  Commonly known as: NORVASC     aspirin 81 MG EC tablet     B-D UF III MINI PEN NEEDLES 31G X 5 MM Misc  Generic drug: Insulin Pen Needle     CALTRATE 600 PLUS-VIT D PO     esomeprazole Magnesium 40 MG Pack  Commonly known as: NEXIUM     Lantus SoloStar 100 UNIT/ML injection pen  Generic drug: insulin glargine     losartan 50 MG

## 2024-03-07 NOTE — PROGRESS NOTES
I have reviewed discharge instructions with the patient. The patient verbalized understanding. Discharge medications reviewed with patient and appropriate educational materials and side effects teaching were provided. Follow-up appointments reviewed. Opportunity for questions and clarification was provided.  Venous access removed without difficulty.  Patient's belongings gathered and sent with patient. Patient is ready for discharge.     [facility] transferred patient at 14:30 to [facility], verbal report was given to RENATA Brooks, the following information was included SBAR, Kardex, Intake/Output, and MAR.      Tish Turner RN

## 2024-03-07 NOTE — PLAN OF CARE
Problem: Occupational Therapy - Adult  Goal: By Discharge: Performs self-care activities at highest level of function for planned discharge setting.  See evaluation for individualized goals.  Description: FUNCTIONAL STATUS PRIOR TO ADMISSION:    Receives Help From:  (friends, neighbors; [facility]- 3 meals daily), ADL Assistance: Independent,  ,  ,  ,  ,  ,  , Ambulation Assistance: Independent (RW), Transfer Assistance: Independent, Active : No     HOME SUPPORT: [place] lived [place] [facility]; takes all meals in dining so.    Occupational Therapy Goals:  Initiated 3/5/2024  1.  Patient will perform grooming S in standing VSS with Minimal Assist within 7 day(s).  2.  Patient will perform upper body dressing with Stand by Assist within 7 day(s).  3.  Patient will perform lower body dressing with Contact Guard Assist within 7 day(s).  4.  Patient will perform toilet transfers with Contact Guard Assist  within 7 day(s).  5.  Patient will perform all aspects of toileting with Contact Guard Assist within 7 day(s).  6.  Patient will participate in upper extremity therapeutic exercise/activities with Supervision for 5 minutes within 7 day(s).    7.  Patient will utilize energy conservation techniques during functional activities with verbal cues within 7 day(s).   Outcome: Progressing   OCCUPATIONAL THERAPY TREATMENT  Patient: Roxanne Hill (89 y.o. female)  Date: 3/7/2024  Primary Diagnosis: Cellulitis of left foot [L03.116]  Diabetic foot infection (HCC) [E11.628, L08.9]  Procedure(s) (LRB):  LEFT GREAT TOE AMPUTATION (Left) 5 Days Post-Op   Precautions: Fall Risk, General Precautions, Bed Alarm (LLE WBAT with post op shoe; impaired safety awareness!)                Chart, occupational therapy assessment, plan of care, and goals were reviewed.    ASSESSMENT  Patient continues to benefit from skilled OT services and is slowly progressing towards goals. More cognitively clear today and able to verbalize  understanding of need for rehab; she reports that she was quite concerned and did not understand, but with training today, able to verbalize understand of need for rehab.              PLAN :  Patient continues to benefit from skilled intervention to address the above impairments.  Continue treatment per established plan of care to address goals.    Recommend with staff: up to chair, BSC    Recommend next OT session: up to bathroom with RW, A x 2 PRN for this distance    Recommendation for discharge: (in order for the patient to meet his/her long term goals): Therapy up to 5 days/week in Skilled nursing facility    Other factors to consider for discharge: available support system works or is unable to provide adequate supervision and the patient would be alone, impaired cognition, high risk for falls, not safe to be alone, concern for safely navigating or managing the home environment, and surgical wounds great toe and heel on L foot    IF patient discharges home will need the following DME: except for w/c patient owns DME required for discharge       SUBJECTIVE:   Patient stated “I see, you are right, I would not be able to do those things right now.”     OBJECTIVE DATA SUMMARY:   Cognitive/Behavioral Status:  Orientation  Overall Orientation Status: Within Functional Limits  Orientation Level: Oriented X4  Cognition  Overall Cognitive Status: WFL (improving, more aware of cog changes since surgery; able to verbalize understanding that she needs assist with meds, finances, wound care etc post this recent surgery; yesterday still not aware of need for help)  Arousal/Alertness: Appropriate responses to stimuli  Following Commands: Follows one step commands with increased time;Follows one step commands consistently  Attention Span: Difficulty dividing attention  Memory: Decreased recall of precautions;Decreased short term memory (improved today compared to yesterday)  Safety Judgement: Decreased awareness of need for

## 2024-03-07 NOTE — PROGRESS NOTES
Spiritual Care Partner Volunteer visited patient at [facility] in [facility] on 3/7/2024   Documented by:    VERONICA Gayle  [facility]   Paging Service [phone])

## 2024-03-27 ENCOUNTER — OFFICE VISIT (OUTPATIENT)
Age: 89
End: 2024-03-27
Payer: MEDICARE

## 2024-03-27 VITALS
OXYGEN SATURATION: 96 % | HEIGHT: 64 IN | DIASTOLIC BLOOD PRESSURE: 72 MMHG | HEART RATE: 64 BPM | WEIGHT: 130 LBS | RESPIRATION RATE: 18 BRPM | SYSTOLIC BLOOD PRESSURE: 130 MMHG | BODY MASS INDEX: 22.2 KG/M2

## 2024-03-27 DIAGNOSIS — H61.23 BILATERAL IMPACTED CERUMEN: ICD-10-CM

## 2024-03-27 DIAGNOSIS — R04.0 EPISTAXIS: ICD-10-CM

## 2024-03-27 DIAGNOSIS — H90.3 SENSORINEURAL HEARING LOSS, BILATERAL: Primary | ICD-10-CM

## 2024-03-27 PROCEDURE — 69210 REMOVE IMPACTED EAR WAX UNI: CPT | Performed by: OTOLARYNGOLOGY

## 2024-03-27 PROCEDURE — 1090F PRES/ABSN URINE INCON ASSESS: CPT | Performed by: OTOLARYNGOLOGY

## 2024-03-27 PROCEDURE — 99213 OFFICE O/P EST LOW 20 MIN: CPT | Performed by: OTOLARYNGOLOGY

## 2024-03-27 PROCEDURE — 1123F ACP DISCUSS/DSCN MKR DOCD: CPT | Performed by: OTOLARYNGOLOGY

## 2024-03-27 PROCEDURE — G8427 DOCREV CUR MEDS BY ELIG CLIN: HCPCS | Performed by: OTOLARYNGOLOGY

## 2024-03-27 PROCEDURE — 1036F TOBACCO NON-USER: CPT | Performed by: OTOLARYNGOLOGY

## 2024-03-27 PROCEDURE — 1111F DSCHRG MED/CURRENT MED MERGE: CPT | Performed by: OTOLARYNGOLOGY

## 2024-03-27 PROCEDURE — G8420 CALC BMI NORM PARAMETERS: HCPCS | Performed by: OTOLARYNGOLOGY

## 2024-03-27 PROCEDURE — G8484 FLU IMMUNIZE NO ADMIN: HCPCS | Performed by: OTOLARYNGOLOGY

## 2024-05-31 ENCOUNTER — APPOINTMENT (OUTPATIENT)
Facility: HOSPITAL | Age: 89
End: 2024-05-31
Payer: MEDICARE

## 2024-05-31 ENCOUNTER — HOSPITAL ENCOUNTER (EMERGENCY)
Facility: HOSPITAL | Age: 89
Discharge: HOME | End: 2024-05-31
Attending: FAMILY MEDICINE
Payer: MEDICARE

## 2024-05-31 VITALS
OXYGEN SATURATION: 98 % | HEART RATE: 71 BPM | DIASTOLIC BLOOD PRESSURE: 72 MMHG | SYSTOLIC BLOOD PRESSURE: 151 MMHG | HEIGHT: 64 IN | TEMPERATURE: 98.2 F | BODY MASS INDEX: 22.2 KG/M2 | WEIGHT: 130 LBS | RESPIRATION RATE: 16 BRPM

## 2024-05-31 DIAGNOSIS — S61.411A LACERATION OF RIGHT HAND WITHOUT FOREIGN BODY, INITIAL ENCOUNTER: Primary | ICD-10-CM

## 2024-05-31 PROCEDURE — 99284 EMERGENCY DEPT VISIT MOD MDM: CPT

## 2024-05-31 PROCEDURE — 70450 CT HEAD/BRAIN W/O DYE: CPT

## 2024-05-31 PROCEDURE — 72125 CT NECK SPINE W/O DYE: CPT

## 2024-05-31 PROCEDURE — 73130 X-RAY EXAM OF HAND: CPT

## 2024-05-31 PROCEDURE — 73522 X-RAY EXAM HIPS BI 3-4 VIEWS: CPT

## 2024-05-31 PROCEDURE — 6370000000 HC RX 637 (ALT 250 FOR IP): Performed by: PHYSICIAN ASSISTANT

## 2024-05-31 PROCEDURE — 2500000003 HC RX 250 WO HCPCS: Performed by: PHYSICIAN ASSISTANT

## 2024-05-31 PROCEDURE — 12002 RPR S/N/AX/GEN/TRNK2.6-7.5CM: CPT

## 2024-05-31 RX ORDER — LIDOCAINE HYDROCHLORIDE 10 MG/ML
10 INJECTION, SOLUTION INFILTRATION; PERINEURAL
Status: COMPLETED | OUTPATIENT
Start: 2024-05-31 | End: 2024-05-31

## 2024-05-31 RX ORDER — SENNOSIDES 8.6 MG
650 CAPSULE ORAL EVERY 8 HOURS PRN
Qty: 12 TABLET | Refills: 0 | Status: SHIPPED | OUTPATIENT
Start: 2024-05-31 | End: 2024-06-04

## 2024-05-31 RX ORDER — GINSENG 100 MG
CAPSULE ORAL
Status: COMPLETED | OUTPATIENT
Start: 2024-05-31 | End: 2024-05-31

## 2024-05-31 RX ADMIN — LIDOCAINE HYDROCHLORIDE 10 ML: 10 INJECTION, SOLUTION INFILTRATION; PERINEURAL at 15:10

## 2024-05-31 RX ADMIN — BACITRACIN: 500 OINTMENT TOPICAL at 18:02

## 2024-05-31 ASSESSMENT — PAIN SCALES - GENERAL: PAINLEVEL_OUTOF10: 4

## 2024-05-31 ASSESSMENT — PAIN - FUNCTIONAL ASSESSMENT: PAIN_FUNCTIONAL_ASSESSMENT: 0-10

## 2024-05-31 NOTE — DISCHARGE INSTRUCTIONS
Thank you for choosing our Emergency Department for your care.  It is our privilege to care for you in your time of need.  In the next several days, you may receive a survey via email or mailed to your home about your experience with our team.  We would greatly appreciate you taking a few minutes to complete the survey, as we use this information to learn what we have done well and what we could be doing better. Thank you for trusting us with your care!    Below you will find a list of your tests from today's visit.   Labs  No results found for this or any previous visit (from the past 12 hour(s)).    Radiologic Studies  CT CERVICAL SPINE WO CONTRAST   Final Result   1.  No acute fractures.   2.  There are multilevel degenerative changes of the spine as described above.            CT HEAD WO CONTRAST   Final Result   1.  No acute intracranial abnormalities.   2.  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         XR HIP 3-4 VW W PELVIS BILATERAL   Final Result   No acute bony abnormalities.            XR HAND RIGHT (MIN 3 VIEWS)   Final Result   No fracture or foreign body.        ------------------------------------------------------------------------------------------------------------  The evaluation and treatment you received in the Emergency Department were for an urgent problem. It is important that you follow-up with a doctor, nurse practitioner, or physician assistant to:  (1) confirm your diagnosis,  (2) re-evaluation of changes in your illness and treatment, and (3) for ongoing care. Please take your discharge instructions with you when you go to your follow-up appointment.     If you have any problem arranging a follow-up appointment, contact us!  If your symptoms become worse or you do not improve as expected, please return to us. We are available 24 hours a day.     If a prescription has been provided, please fill it as soon as possible to prevent a delay in treatment. If you have any questions or reservations about

## 2024-05-31 NOTE — ED PROVIDER NOTES
PROCEDURE NOTE    I was asked by the attending physician, Kapil Paredes DO, to evaluate this hand laceration.    Procedures  Procedure Note - Laceration Repair:  6:10   Procedure by Kapil Medeiros PA-C   Complexity: intermediate (contamination, extensive cleaning, layered closure)  4.5 cm irregular curved laceration to right palm was irrigated copiously with NS under jet lavage, prepped with Chlorprep and draped in a sterile fashion.  The area was anesthetized via local infiltration of 6 mL Lidocaine 1%.  The wound was explored with the following results: No foreign bodies found, No tendon laceration seen.  The wound was repaired with One layer suture closure: Skin Layer:  10 sutures placed, stitch type:simple interrupted, suture: 4-0 nylon..  The wound was closed with good hemostasis and approximation.  Sterile dressing applied.  Estimated blood loss: minimal  The procedure took 31-45 minutes, and pt tolerated well.       Kapil Medeiros PA-C  05/31/24 0224

## 2024-06-06 NOTE — ED PROVIDER NOTES
EMERGENCY DEPARTMENT HISTORY AND PHYSICAL EXAM      Date: 5/31/2024  Patient Name: Roxanne Hill    History of Presenting Illness     Chief Complaint   Patient presents with    Fall       History Provided By:     HPI: Roxanne Hill, is a very pleasant 89 y.o. female presenting to the ED with a chief complaint of fall, hand injury.  She resides at the [facility] where she sustained a ground-level fall after tripping.  States she fell forward onto her right hand.  Does not believe she hit her head but is not entirely sure.  She complains only of right hand pain and bleeding.  No headache or vision changes.  No neck or back pain.  No hip pain.  She has been ambulatory since.    Denies any other symptoms at this time.    PCP: Fabi Courtney APRN - NP    No current facility-administered medications on file prior to encounter.     Current Outpatient Medications on File Prior to Encounter   Medication Sig Dispense Refill    aspirin 81 MG EC tablet Take 1 tablet by mouth daily      esomeprazole Magnesium (NEXIUM) 40 MG PACK Take 1 packet by mouth daily      LANTUS SOLOSTAR 100 UNIT/ML injection pen Inject 14 Units into the skin nightly Increases to 14 units nightly recently      B-D UF III MINI PEN NEEDLES 31G X 5 MM MISC       Calcium-Vitamin D (CALTRATE 600 PLUS-VIT D PO) Caltrate 600 plus D      sodium chloride (OCEAN, BABY AYR) 0.65 % nasal spray 1 spray by Nasal route as needed for Congestion      amLODIPine (NORVASC) 10 MG tablet Take 1 tablet by mouth daily      losartan (COZAAR) 50 MG tablet Take 1 tablet by mouth daily      pregabalin (LYRICA) 50 MG capsule Take 1 capsule by mouth 2 times daily.      SITagliptin (JANUVIA) 50 MG tablet Take 1 tablet by mouth daily         Past History     Past Medical History:  Past Medical History:   Diagnosis Date    DM (diabetes mellitus) (HCC)     High cholesterol     History of multiple allergies     HTN (hypertension)     Hypertension     Osteoporosis     Type 2  rash.      Comments: Stellate laceration right palm   Neurological:      General: No focal deficit present.      Mental Status: She is alert and oriented to person, place, and time.   Psychiatric:         Mood and Affect: Mood normal.         Behavior: Behavior normal.         Lab and Diagnostic Study Results     Labs -   No results found for this or any previous visit (from the past 12 hour(s)).    Radiology:  Interpretation per the Radiologist below, if available at the time of this note:  CT CERVICAL SPINE WO CONTRAST   Final Result   1.  No acute fractures.   2.  There are multilevel degenerative changes of the spine as described above.            CT HEAD WO CONTRAST   Final Result   1.  No acute intracranial abnormalities.   2.  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         XR HIP 3-4 VW W PELVIS BILATERAL   Final Result   No acute bony abnormalities.            XR HAND RIGHT (MIN 3 VIEWS)   Final Result   No fracture or foreign body.             Medical Decision Making   - I am the primary provider for this patient Kapil Paredes DO  - I reviewed the vital signs, available nursing notes, past medical history, past surgical history, family history and social history.  - Initial assessment performed. The patients presenting problems have been discussed, and they are in agreement with the care plan formulated and outlined with them.  I have encouraged them to ask questions as they arise throughout their visit.    Vital Signs-Reviewed the patient's vital signs.  Vitals:    05/31/24 1339   BP: (!) 151/72   Pulse: 71   Resp: 16   Temp: 98.2 °F (36.8 °C)   SpO2: 98%   Weight: 59 kg (130 lb)   Height: 1.626 m (5' 4\")       CONSULTS: (Who and What was discussed)  None      Chronic Conditions: Reviewed per above     Social Determinants affecting Dx or Tx: None     Records Reviewed (source and summary of external notes): Nursing notes           ED Course/ Provider Notes (Medical Decision Making):     Patient presented to the

## 2024-06-13 ENCOUNTER — APPOINTMENT (OUTPATIENT)
Facility: HOSPITAL | Age: 89
End: 2024-06-13
Payer: MEDICARE

## 2024-06-13 ENCOUNTER — HOSPITAL ENCOUNTER (EMERGENCY)
Facility: HOSPITAL | Age: 89
Discharge: HOME | End: 2024-06-14
Attending: EMERGENCY MEDICINE
Payer: MEDICARE

## 2024-06-13 DIAGNOSIS — S32.9XXA CLOSED NONDISPLACED FRACTURE OF PELVIS, UNSPECIFIED PART OF PELVIS, INITIAL ENCOUNTER (HCC): ICD-10-CM

## 2024-06-13 DIAGNOSIS — S09.90XA INJURY OF HEAD, INITIAL ENCOUNTER: Primary | ICD-10-CM

## 2024-06-13 DIAGNOSIS — S51.011A LACERATION OF RIGHT ELBOW, INITIAL ENCOUNTER: ICD-10-CM

## 2024-06-13 PROCEDURE — 6370000000 HC RX 637 (ALT 250 FOR IP): Performed by: EMERGENCY MEDICINE

## 2024-06-13 PROCEDURE — 70450 CT HEAD/BRAIN W/O DYE: CPT

## 2024-06-13 PROCEDURE — 99284 EMERGENCY DEPT VISIT MOD MDM: CPT

## 2024-06-13 PROCEDURE — 12001 RPR S/N/AX/GEN/TRNK 2.5CM/<: CPT

## 2024-06-13 PROCEDURE — 72192 CT PELVIS W/O DYE: CPT

## 2024-06-13 RX ORDER — GINSENG 100 MG
CAPSULE ORAL
Status: COMPLETED | OUTPATIENT
Start: 2024-06-13 | End: 2024-06-13

## 2024-06-13 RX ADMIN — BACITRACIN: 500 OINTMENT TOPICAL at 23:26

## 2024-06-13 ASSESSMENT — PAIN DESCRIPTION - LOCATION: LOCATION: ARM;HIP

## 2024-06-13 ASSESSMENT — PAIN - FUNCTIONAL ASSESSMENT: PAIN_FUNCTIONAL_ASSESSMENT: 0-10

## 2024-06-13 ASSESSMENT — PAIN DESCRIPTION - ORIENTATION: ORIENTATION: RIGHT;LEFT

## 2024-06-13 ASSESSMENT — PAIN SCALES - GENERAL: PAINLEVEL_OUTOF10: 6

## 2024-06-13 ASSESSMENT — PAIN DESCRIPTION - PAIN TYPE: TYPE: ACUTE PAIN

## 2024-06-14 VITALS
SYSTOLIC BLOOD PRESSURE: 125 MMHG | TEMPERATURE: 97.4 F | OXYGEN SATURATION: 94 % | HEART RATE: 85 BPM | BODY MASS INDEX: 21.33 KG/M2 | RESPIRATION RATE: 18 BRPM | DIASTOLIC BLOOD PRESSURE: 73 MMHG | WEIGHT: 128 LBS | HEIGHT: 65 IN

## 2024-06-14 NOTE — ED PROVIDER NOTES
MEDICATIONS:  Current Discharge Medication List          I am the Primary Clinician of Record. Hudosn friedman MD (electronically signed)    (Please note that parts of this dictation were completed with voice recognition software. Quite often unanticipated grammatical, syntax, homophones, and other interpretive errors are inadvertently transcribed by the computer software. Please disregards these errors. Please excuse any errors that have escaped final proofreading.)     Hudson Friedman MD  06/14/24 0159

## 2024-06-14 NOTE — ED TRIAGE NOTES
Patient had fall in assisted living about 9pm after tripping on her shoe. Hit back right of head, has laceration on right arm above elbow and new left hip pain. Is not on blood thinners and has not taken aspirin for the last week.

## 2024-06-15 ENCOUNTER — HOSPITAL ENCOUNTER (EMERGENCY)
Facility: HOSPITAL | Age: 89
Discharge: HOME | End: 2024-06-15
Payer: MEDICARE

## 2024-06-15 VITALS
HEART RATE: 77 BPM | TEMPERATURE: 97.6 F | DIASTOLIC BLOOD PRESSURE: 46 MMHG | WEIGHT: 128.09 LBS | HEIGHT: 65 IN | OXYGEN SATURATION: 95 % | SYSTOLIC BLOOD PRESSURE: 129 MMHG | RESPIRATION RATE: 17 BRPM | BODY MASS INDEX: 21.34 KG/M2

## 2024-06-15 DIAGNOSIS — T81.33XA DISRUPTION OF TRAUMATIC INJURY WOUND REPAIR, INITIAL ENCOUNTER: Primary | ICD-10-CM

## 2024-06-15 PROCEDURE — 99283 EMERGENCY DEPT VISIT LOW MDM: CPT

## 2024-06-15 RX ORDER — CEPHALEXIN 500 MG/1
500 CAPSULE ORAL 4 TIMES DAILY
Qty: 28 CAPSULE | Refills: 0 | Status: SHIPPED | OUTPATIENT
Start: 2024-06-15 | End: 2024-06-22

## 2024-06-15 ASSESSMENT — PAIN - FUNCTIONAL ASSESSMENT: PAIN_FUNCTIONAL_ASSESSMENT: NONE - DENIES PAIN

## 2024-06-15 NOTE — ED TRIAGE NOTES
Pt arrival to ED via EMS. Pt seen here 6/13/24 sutures placed to right elbow, reported that sutures have torn, so brought to ED for further eval.

## 2024-06-15 NOTE — ED PROVIDER NOTES
[facility] EMERGENCY DEPT  EMERGENCY DEPARTMENT HISTORY AND PHYSICAL EXAM      Date: 6/15/2024  Patient Name: Roxanne Hill  MRN: [id number]  YOB: 1923  Date of evaluation: 6/15/2024  Provider: RAJENDRA Renae NP   Note Started: 11:35 AM EDT 6/15/24    HISTORY OF PRESENT ILLNESS     Chief Complaint   Patient presents with    Wound Check       History Provided By: Patient    HPI: Roxanne Hill is a 89 y.o. female with past medical history as listed below presents to the ER for wound check.  Patient had Steri-Strips placed on her right elbow 2 days ago.  Patient comes in for evaluation.    PAST MEDICAL HISTORY   Past Medical History:  Past Medical History:   Diagnosis Date    DM (diabetes mellitus) (HCC)     High cholesterol     History of multiple allergies     HTN (hypertension)     Hypertension     Osteoporosis     Type 2 diabetes mellitus (HCC)        Past Surgical History:  Past Surgical History:   Procedure Laterality Date    APPENDECTOMY      HIP ARTHROSCOPY      HYSTERECTOMY (CERVIX STATUS UNKNOWN)      KNEE ARTHROSCOPY      TOE AMPUTATION Left 3/2/2024    LEFT GREAT TOE AMPUTATION performed by Bib Alexander DPM at [facility] MAIN OR    TOTAL HIP ARTHROPLASTY         Family History:  Family History   Problem Relation Age of Onset    Osteoporosis Sister     Hypertension Mother     Diabetes Mother        Social History:  Social History     Tobacco Use    Smoking status: Never    Smokeless tobacco: Never   Vaping Use    Vaping Use: Never used   Substance Use Topics    Alcohol use: Never    Drug use: Never       Allergies:  No Known Allergies    PCP: Fabi Courtney APRN - NP    Current Meds:   No current facility-administered medications for this encounter.     Current Outpatient Medications   Medication Sig Dispense Refill    cephALEXin (KEFLEX) 500 MG capsule Take 1 capsule by mouth 4 times daily for 7 days 28 capsule 0    acetaminophen (TYLENOL 8 HOUR) 650 MG extended release tablet Take 1 tablet by  mouth daily      LANTUS SOLOSTAR 100 UNIT/ML injection pen Inject 14 Units into the skin nightly Increases to 14 units nightly recently      B-D UF III MINI PEN NEEDLES 31G X 5 MM MISC       Calcium-Vitamin D (CALTRATE 600 PLUS-VIT D PO) Caltrate 600 plus D      sodium chloride (OCEAN, BABY AYR) 0.65 % nasal spray 1 spray by Nasal route as needed for Congestion      amLODIPine (NORVASC) 10 MG tablet Take 1 tablet by mouth daily      losartan (COZAAR) 50 MG tablet Take 1 tablet by mouth daily      pregabalin (LYRICA) 50 MG capsule Take 1 capsule by mouth 2 times daily.      SITagliptin (JANUVIA) 50 MG tablet Take 1 tablet by mouth daily         Social Determinants of Health:   Social Determinants of Health     Tobacco Use: Low Risk  (6/15/2024)    Patient History     Smoking Tobacco Use: Never     Smokeless Tobacco Use: Never     Passive Exposure: Not on file   Alcohol Use: Not At Risk (6/15/2024)    AUDIT-C     Frequency of Alcohol Consumption: Never     Average Number of Drinks: Patient does not drink     Frequency of Binge Drinking: Never   Financial Resource Strain: Not on file   Food Insecurity: Not on file   Transportation Needs: Not on file   Physical Activity: Not on file   Stress: Not on file   Social Connections: Not on file   Intimate Partner Violence: Not on file   Depression: Not at risk (2/7/2023)    PHQ-2     PHQ-2 Score: 0   Housing Stability: Not on file   Interpersonal Safety: Not At Risk (6/13/2024)    Interpersonal Safety Domain Source: IP Abuse Screening     Physical abuse: Denies     Verbal abuse: Denies     Emotional abuse: Denies     Financial abuse: Denies     Sexual abuse: Denies   Utilities: Not on file       PHYSICAL EXAM   Physical Exam  ***    SCREENINGS                  LAB, EKG AND DIAGNOSTIC RESULTS   Labs:  No results found for this or any previous visit (from the past 12 hour(s)).    EKG: {EKG smartlist:10021}    Radiologic Studies:  Non-plain film images such as CT, Ultrasound and

## 2024-07-11 ENCOUNTER — LAB (OUTPATIENT)
Age: 89
End: 2024-07-11

## 2024-07-11 DIAGNOSIS — M81.0 SENILE OSTEOPOROSIS: ICD-10-CM

## 2024-07-11 DIAGNOSIS — E55.9 VITAMIN D DEFICIENCY: ICD-10-CM

## 2024-07-11 LAB
25(OH)D3 SERPL-MCNC: 47.8 NG/ML (ref 30–100)
ANION GAP SERPL CALC-SCNC: 6 MMOL/L (ref 5–15)
BUN SERPL-MCNC: 28 MG/DL (ref 6–20)
BUN/CREAT SERPL: 26 (ref 12–20)
CALCIUM SERPL-MCNC: 9.5 MG/DL (ref 8.5–10.1)
CHLORIDE SERPL-SCNC: 103 MMOL/L (ref 97–108)
CO2 SERPL-SCNC: 29 MMOL/L (ref 21–32)
CREAT SERPL-MCNC: 1.07 MG/DL (ref 0.55–1.02)
GLUCOSE SERPL-MCNC: 375 MG/DL (ref 65–100)
POTASSIUM SERPL-SCNC: 4.2 MMOL/L (ref 3.5–5.1)
SODIUM SERPL-SCNC: 138 MMOL/L (ref 136–145)

## 2024-07-18 ENCOUNTER — OFFICE VISIT (OUTPATIENT)
Age: 89
End: 2024-07-18

## 2024-07-18 VITALS
DIASTOLIC BLOOD PRESSURE: 47 MMHG | WEIGHT: 131.9 LBS | SYSTOLIC BLOOD PRESSURE: 124 MMHG | HEART RATE: 76 BPM | HEIGHT: 65 IN | TEMPERATURE: 98.6 F | BODY MASS INDEX: 21.98 KG/M2 | OXYGEN SATURATION: 95 %

## 2024-07-18 DIAGNOSIS — M81.0 AGE-RELATED OSTEOPOROSIS WITHOUT CURRENT PATHOLOGICAL FRACTURE: Primary | ICD-10-CM

## 2024-07-18 DIAGNOSIS — E55.9 VITAMIN D DEFICIENCY: ICD-10-CM

## 2024-07-18 RX ORDER — EPINEPHRINE 1 MG/ML
0.3 INJECTION, SOLUTION, CONCENTRATE INTRAVENOUS PRN
OUTPATIENT
Start: 2024-07-22

## 2024-07-18 RX ORDER — FAMOTIDINE 20 MG/1
20 TABLET, FILM COATED ORAL DAILY
COMMUNITY
Start: 2024-07-08

## 2024-07-18 RX ORDER — DIPHENHYDRAMINE HYDROCHLORIDE 50 MG/ML
50 INJECTION INTRAMUSCULAR; INTRAVENOUS
OUTPATIENT
Start: 2024-07-22

## 2024-07-18 RX ORDER — ONDANSETRON 2 MG/ML
8 INJECTION INTRAMUSCULAR; INTRAVENOUS
OUTPATIENT
Start: 2024-07-22

## 2024-07-18 RX ORDER — TRAMADOL HYDROCHLORIDE 100 MG/1
1 TABLET, COATED ORAL DAILY
COMMUNITY

## 2024-07-18 RX ORDER — FAMOTIDINE 10 MG/ML
20 INJECTION, SOLUTION INTRAVENOUS
OUTPATIENT
Start: 2024-07-22

## 2024-07-18 RX ORDER — ALBUTEROL SULFATE 90 UG/1
4 AEROSOL, METERED RESPIRATORY (INHALATION) PRN
OUTPATIENT
Start: 2024-07-22

## 2024-07-18 RX ORDER — CLOPIDOGREL BISULFATE 75 MG/1
75 TABLET ORAL DAILY
COMMUNITY
Start: 2024-06-20

## 2024-07-18 RX ORDER — SODIUM CHLORIDE 9 MG/ML
INJECTION, SOLUTION INTRAVENOUS CONTINUOUS
OUTPATIENT
Start: 2024-07-22

## 2024-07-18 RX ORDER — ACETAMINOPHEN 325 MG/1
650 TABLET ORAL
OUTPATIENT
Start: 2024-07-22

## 2024-07-18 NOTE — PROGRESS NOTES
[facility] DIABETES AND ENDOCRINOLOGY                 Suzi Mishra MD        Name : Roxanne Hill      ABRAHAN.O.B : 9/20/1923           Referred by: Muriel Navarro MD       [facility] [phone]         Osteoporosis follow-up      History of Present Illness: Roxanne Hill is here for follow-up of osteoporosis.      She was diagnosed with osteoporosis 10 years ago, was on Prolia since 2014, initially got it from [facility] orthopedics   Tolerated Prolia well.  She has underlying GERD, no peptic ulcer disease.   Left hip fracture after a fall in 2017   Fell in the bathroom, right hip fracture April 2022, had hemiarthroplasty. Reviewed the x-ray, no intertrochanteric fracture  She is getting Prolia at the hospital,  Here with a friend    2024: Fall, after a patient's wheelchair ran over, acute small pubic fracture, no displaced fracture  She often forgets to use the walker per friend                   Physical Examination:      General: pleasant, no distress, good eye contact    HEENT: no exophthalmos, no periorbital edema, EOMI    Neck: No thyromegaly    CVS: S1-S2 regular    RS: Normal respiratory effort    Musculoskeletal: no tremors    Neurological: alert and oriented    Psychiatric: normal mood and affect  Left lower extremity: Erythema, normal temperature, feeble dorsalis pedis pulse, venous stasis changes, scheduled to see vascular in 2 days  No signs of infection      Data Reviewed:         Lab Results   Component Value Date    CALCIUM 9.5 07/11/2024    CAION 5.1 06/15/2022    PHOS 3.8 12/21/2021      Lab Results   Component Value Date/Time     07/11/2024 10:44 AM    K 4.2 07/11/2024 10:44 AM     07/11/2024 10:44 AM    CO2 29 07/11/2024 10:44 AM    BUN 28 07/11/2024 10:44 AM    CREATININE 1.07 07/11/2024 10:44 AM    GLUCOSE 375 07/11/2024 10:44 AM    CALCIUM 9.5 07/11/2024 10:44 AM      Magnesium   Date Value Ref Range Status   12/21/2021 2.0 1.6 - 2.4 mg/dL Final     Lab

## 2024-07-24 ENCOUNTER — HOSPITAL ENCOUNTER (OUTPATIENT)
Facility: HOSPITAL | Age: 89
Setting detail: INFUSION SERIES
Discharge: HOME | End: 2024-07-24
Payer: MEDICARE

## 2024-07-24 VITALS
RESPIRATION RATE: 16 BRPM | HEART RATE: 78 BPM | WEIGHT: 129 LBS | SYSTOLIC BLOOD PRESSURE: 128 MMHG | BODY MASS INDEX: 21.49 KG/M2 | OXYGEN SATURATION: 96 % | TEMPERATURE: 98.6 F | DIASTOLIC BLOOD PRESSURE: 56 MMHG | HEIGHT: 65 IN

## 2024-07-24 PROCEDURE — 96372 THER/PROPH/DIAG INJ SC/IM: CPT

## 2024-07-24 PROCEDURE — 6360000002 HC RX W HCPCS: Performed by: INTERNAL MEDICINE

## 2024-07-24 RX ADMIN — DENOSUMAB 60 MG: 60 INJECTION SUBCUTANEOUS at 11:23

## 2024-07-24 NOTE — PROGRESS NOTES
Pt    arrived  today   for  prolia  alert  oriented  and  here  today  for  2nd  prolia  this  year  had  labs  on  7-11-24

## 2024-07-24 NOTE — PROGRESS NOTES
Prolia  60mg  given  sub Q in left upper  arm pepito well  no reddness or  bruising  noted  bandaid  applied discharged  with   caregiver pt  informed  need  new  order  for  next  dose  due  in  Jan 24, 2025

## 2024-08-23 ENCOUNTER — APPOINTMENT (OUTPATIENT)
Facility: HOSPITAL | Age: 89
End: 2024-08-23
Payer: MEDICARE

## 2024-08-23 ENCOUNTER — HOSPITAL ENCOUNTER (EMERGENCY)
Facility: HOSPITAL | Age: 89
Discharge: HOME | End: 2024-08-23
Attending: EMERGENCY MEDICINE
Payer: MEDICARE

## 2024-08-23 ENCOUNTER — APPOINTMENT (OUTPATIENT)
Facility: HOSPITAL | Age: 89
End: 2024-08-23
Attending: EMERGENCY MEDICINE
Payer: MEDICARE

## 2024-08-23 VITALS
BODY MASS INDEX: 21.66 KG/M2 | WEIGHT: 130 LBS | RESPIRATION RATE: 18 BRPM | SYSTOLIC BLOOD PRESSURE: 144 MMHG | TEMPERATURE: 98 F | OXYGEN SATURATION: 98 % | HEIGHT: 65 IN | HEART RATE: 79 BPM | DIASTOLIC BLOOD PRESSURE: 59 MMHG

## 2024-08-23 DIAGNOSIS — N30.00 ACUTE CYSTITIS WITHOUT HEMATURIA: Primary | ICD-10-CM

## 2024-08-23 DIAGNOSIS — S32.008A OTHER CLOSED FRACTURE OF LUMBAR VERTEBRA, UNSPECIFIED LUMBAR VERTEBRAL LEVEL, INITIAL ENCOUNTER (HCC): ICD-10-CM

## 2024-08-23 LAB
ALBUMIN SERPL-MCNC: 2.9 G/DL (ref 3.5–5)
ALBUMIN/GLOB SERPL: 0.7 (ref 1.1–2.2)
ALP SERPL-CCNC: 100 U/L (ref 45–117)
ALT SERPL-CCNC: 23 U/L (ref 12–78)
ANION GAP SERPL CALC-SCNC: 4 MMOL/L (ref 5–15)
APPEARANCE UR: CLEAR
AST SERPL W P-5'-P-CCNC: 17 U/L (ref 15–37)
BACTERIA URNS QL MICRO: NEGATIVE /HPF
BACTERIA URNS QL MICRO: NEGATIVE /HPF
BASOPHILS # BLD: 0 K/UL (ref 0–0.1)
BASOPHILS NFR BLD: 0 % (ref 0–1)
BILIRUB SERPL-MCNC: 0.2 MG/DL (ref 0.2–1)
BILIRUB UR QL: NEGATIVE
BUN SERPL-MCNC: 18 MG/DL (ref 6–20)
BUN/CREAT SERPL: 20 (ref 12–20)
CA-I BLD-MCNC: 8.9 MG/DL (ref 8.5–10.1)
CHLORIDE SERPL-SCNC: 102 MMOL/L (ref 97–108)
CO2 SERPL-SCNC: 27 MMOL/L (ref 21–32)
COLOR UR: ABNORMAL
CREAT SERPL-MCNC: 0.89 MG/DL (ref 0.55–1.02)
DIFFERENTIAL METHOD BLD: ABNORMAL
EOSINOPHIL # BLD: 0.1 K/UL (ref 0–0.4)
EOSINOPHIL NFR BLD: 1 % (ref 0–7)
EPITH CASTS URNS QL MICRO: NORMAL /LPF
ERYTHROCYTE [DISTWIDTH] IN BLOOD BY AUTOMATED COUNT: 14 % (ref 11.5–14.5)
GLOBULIN SER CALC-MCNC: 4.1 G/DL (ref 2–4)
GLUCOSE SERPL-MCNC: 175 MG/DL (ref 65–100)
GLUCOSE UR STRIP.AUTO-MCNC: NEGATIVE MG/DL
HCT VFR BLD AUTO: 37.8 % (ref 35–47)
HGB BLD-MCNC: 12.2 G/DL (ref 11.5–16)
HGB UR QL STRIP: NEGATIVE
IMM GRANULOCYTES # BLD AUTO: 0 K/UL (ref 0–0.04)
IMM GRANULOCYTES NFR BLD AUTO: 0 % (ref 0–0.5)
KETONES UR QL STRIP.AUTO: NEGATIVE MG/DL
LEUKOCYTE ESTERASE UR QL STRIP.AUTO: ABNORMAL
LYMPHOCYTES # BLD: 1.3 K/UL (ref 0.8–3.5)
LYMPHOCYTES NFR BLD: 13 % (ref 12–49)
MCH RBC QN AUTO: 27.3 PG (ref 26–34)
MCHC RBC AUTO-ENTMCNC: 32.3 G/DL (ref 30–36.5)
MCV RBC AUTO: 84.6 FL (ref 80–99)
MONOCYTES # BLD: 0.7 K/UL (ref 0–1)
MONOCYTES NFR BLD: 7 % (ref 5–13)
MUCOUS THREADS URNS QL MICRO: ABNORMAL /LPF
NEUTS SEG # BLD: 7.7 K/UL (ref 1.8–8)
NEUTS SEG NFR BLD: 79 % (ref 32–75)
NITRITE UR QL STRIP.AUTO: POSITIVE
NRBC # BLD: 0 K/UL (ref 0–0.01)
NRBC BLD-RTO: 0 PER 100 WBC
PH UR STRIP: 6 (ref 5–8)
PLATELET # BLD AUTO: 309 K/UL (ref 150–400)
PMV BLD AUTO: 9.8 FL (ref 8.9–12.9)
POTASSIUM SERPL-SCNC: 4.1 MMOL/L (ref 3.5–5.1)
PROT SERPL-MCNC: 7 G/DL (ref 6.4–8.2)
PROT UR STRIP-MCNC: NEGATIVE MG/DL
RBC # BLD AUTO: 4.47 M/UL (ref 3.8–5.2)
RBC #/AREA URNS HPF: ABNORMAL /HPF (ref 0–5)
RBC #/AREA URNS HPF: NORMAL /HPF (ref 0–5)
SODIUM SERPL-SCNC: 133 MMOL/L (ref 136–145)
SP GR UR REFRACTOMETRY: 1.01 (ref 1–1.03)
UROBILINOGEN UR QL STRIP.AUTO: 0.1 EU/DL (ref 0.1–1)
WBC # BLD AUTO: 9.9 K/UL (ref 3.6–11)
WBC URNS QL MICRO: ABNORMAL /HPF (ref 0–4)
WBC URNS QL MICRO: NORMAL /HPF (ref 0–4)

## 2024-08-23 PROCEDURE — 85025 COMPLETE CBC W/AUTO DIFF WBC: CPT

## 2024-08-23 PROCEDURE — 6370000000 HC RX 637 (ALT 250 FOR IP): Performed by: EMERGENCY MEDICINE

## 2024-08-23 PROCEDURE — 99285 EMERGENCY DEPT VISIT HI MDM: CPT

## 2024-08-23 PROCEDURE — 80053 COMPREHEN METABOLIC PANEL: CPT

## 2024-08-23 PROCEDURE — 74177 CT ABD & PELVIS W/CONTRAST: CPT

## 2024-08-23 PROCEDURE — 6360000004 HC RX CONTRAST MEDICATION: Performed by: EMERGENCY MEDICINE

## 2024-08-23 PROCEDURE — 72148 MRI LUMBAR SPINE W/O DYE: CPT

## 2024-08-23 PROCEDURE — 81001 URINALYSIS AUTO W/SCOPE: CPT

## 2024-08-23 PROCEDURE — 36415 COLL VENOUS BLD VENIPUNCTURE: CPT

## 2024-08-23 RX ORDER — CEPHALEXIN 500 MG/1
500 CAPSULE ORAL EVERY 6 HOURS SCHEDULED
Status: DISCONTINUED | OUTPATIENT
Start: 2024-08-23 | End: 2024-08-23 | Stop reason: HOSPADM

## 2024-08-23 RX ORDER — ACETAMINOPHEN 325 MG/1
650 TABLET ORAL
Status: COMPLETED | OUTPATIENT
Start: 2024-08-23 | End: 2024-08-23

## 2024-08-23 RX ORDER — LIDOCAINE 50 MG/G
1 PATCH TOPICAL DAILY
Qty: 10 PATCH | Refills: 0 | Status: SHIPPED | OUTPATIENT
Start: 2024-08-23 | End: 2024-09-02

## 2024-08-23 RX ORDER — CEPHALEXIN 500 MG/1
500 CAPSULE ORAL 3 TIMES DAILY
Qty: 21 CAPSULE | Refills: 0 | Status: SHIPPED | OUTPATIENT
Start: 2024-08-23 | End: 2024-08-30

## 2024-08-23 RX ORDER — IOPAMIDOL 755 MG/ML
100 INJECTION, SOLUTION INTRAVASCULAR
Status: COMPLETED | OUTPATIENT
Start: 2024-08-23 | End: 2024-08-23

## 2024-08-23 RX ADMIN — IOPAMIDOL 100 ML: 755 INJECTION, SOLUTION INTRAVENOUS at 14:39

## 2024-08-23 RX ADMIN — CEPHALEXIN 500 MG: 500 CAPSULE ORAL at 15:04

## 2024-08-23 RX ADMIN — ACETAMINOPHEN 650 MG: 325 TABLET ORAL at 13:29

## 2024-08-23 ASSESSMENT — PAIN SCALES - GENERAL
PAINLEVEL_OUTOF10: 5
PAINLEVEL_OUTOF10: 8

## 2024-08-23 ASSESSMENT — PAIN DESCRIPTION - LOCATION
LOCATION: BACK
LOCATION: BACK

## 2024-08-23 ASSESSMENT — PAIN DESCRIPTION - ORIENTATION: ORIENTATION: LOWER

## 2024-08-23 ASSESSMENT — PAIN - FUNCTIONAL ASSESSMENT: PAIN_FUNCTIONAL_ASSESSMENT: 0-10

## 2024-08-23 NOTE — ED TRIAGE NOTES
Pt arrives via EMS from [facility] with complaint of back pain. Denies recent falls. Hx chronic back pain, HTN, CVA  Fall risk, utilizes a walker at facility

## 2024-08-23 NOTE — ED PROVIDER NOTES
[facility] EMERGENCY DEPT  EMERGENCY DEPARTMENT HISTORY AND PHYSICAL EXAM      Date: 8/23/2024  Patient Name: Roxanne Hill  MRN: [id number]  Birthdate 9/20/1923  Date of evaluation: 8/23/2024  Provider: Ishan Ren MD   Note Started: 12:17 PM EDT 8/23/24    HISTORY OF PRESENT ILLNESS     Chief Complaint   Patient presents with    Back Pain       History Provided By: Patient    HPI: Roxanne Hill is a 89 y.o. female with complicated past medical history to include type 2 diabetes hypertension hypercholesterolemia who presents with back and flank pain going on for the past unknown period of time.  Patient says the no exacerbating or relieving factors she has not treated it with anything.  She says that she is here today for CT scan.  She denies any fever or other exacerbating or relieving factors.  Patient is a poor historian.    PAST MEDICAL HISTORY   Past Medical History:  Past Medical History:   Diagnosis Date    DM (diabetes mellitus) (HCC)     High cholesterol     History of multiple allergies     HTN (hypertension)     Hypertension     Osteoporosis     Type 2 diabetes mellitus (HCC)        Past Surgical History:  Past Surgical History:   Procedure Laterality Date    APPENDECTOMY      HIP ARTHROSCOPY      HYSTERECTOMY (CERVIX STATUS UNKNOWN)      KNEE ARTHROSCOPY      TOE AMPUTATION Left 3/2/2024    LEFT GREAT TOE AMPUTATION performed by Bib Alexander DPM at [facility] MAIN OR    TOTAL HIP ARTHROPLASTY         Family History:  Family History   Problem Relation Age of Onset    Osteoporosis Sister     Hypertension Mother     Diabetes Mother        Social History:  Social History     Tobacco Use    Smoking status: Never    Smokeless tobacco: Never   Vaping Use    Vaping status: Never Used   Substance Use Topics    Alcohol use: Never    Drug use: Never       Allergies:  No Known Allergies    PCP: Fabi Courtney APRN - NP    Current Meds:   No current facility-administered medications for this encounter.     Current  disregards these errors. Please excuse any errors that have escaped final proofreading.)     Ishan Ren MD  08/25/24 [id number]

## 2024-09-23 ENCOUNTER — HOSPITAL ENCOUNTER (EMERGENCY)
Facility: HOSPITAL | Age: 89
Discharge: HOME | End: 2024-09-23
Attending: EMERGENCY MEDICINE
Payer: MEDICARE

## 2024-09-23 ENCOUNTER — APPOINTMENT (OUTPATIENT)
Facility: HOSPITAL | Age: 89
End: 2024-09-23
Payer: MEDICARE

## 2024-09-23 VITALS
DIASTOLIC BLOOD PRESSURE: 65 MMHG | RESPIRATION RATE: 13 BRPM | WEIGHT: 121 LBS | TEMPERATURE: 98.4 F | SYSTOLIC BLOOD PRESSURE: 127 MMHG | OXYGEN SATURATION: 92 % | HEART RATE: 106 BPM | BODY MASS INDEX: 20.14 KG/M2

## 2024-09-23 DIAGNOSIS — S09.90XA INJURY OF HEAD, INITIAL ENCOUNTER: Primary | ICD-10-CM

## 2024-09-23 PROCEDURE — 70450 CT HEAD/BRAIN W/O DYE: CPT

## 2024-09-23 PROCEDURE — 6370000000 HC RX 637 (ALT 250 FOR IP): Performed by: EMERGENCY MEDICINE

## 2024-09-23 PROCEDURE — 72125 CT NECK SPINE W/O DYE: CPT

## 2024-09-23 PROCEDURE — 99285 EMERGENCY DEPT VISIT HI MDM: CPT

## 2024-09-23 PROCEDURE — 6830039000 HC L3 TRAUMA ALERT

## 2024-09-23 PROCEDURE — 72100 X-RAY EXAM L-S SPINE 2/3 VWS: CPT

## 2024-09-23 RX ORDER — HYDROCODONE BITARTRATE AND ACETAMINOPHEN 5; 325 MG/1; MG/1
1 TABLET ORAL
Status: COMPLETED | OUTPATIENT
Start: 2024-09-23 | End: 2024-09-23

## 2024-09-23 RX ADMIN — HYDROCODONE BITARTRATE AND ACETAMINOPHEN 1 TABLET: 5; 325 TABLET ORAL at 17:49

## 2024-09-23 ASSESSMENT — PAIN SCALES - GENERAL
PAINLEVEL_OUTOF10: 4
PAINLEVEL_OUTOF10: 6

## 2024-09-23 ASSESSMENT — PAIN - FUNCTIONAL ASSESSMENT: PAIN_FUNCTIONAL_ASSESSMENT: 0-10

## 2024-09-23 NOTE — ED TRIAGE NOTES
Pt from [facility]. EMS was called for a GLF. Pt hit head on baseboard of bed. pt on blood thinners. Denies LOC.

## 2024-09-23 NOTE — ED PROVIDER NOTES
[facility] EMERGENCY DEPT  EMERGENCY DEPARTMENT HISTORY AND PHYSICAL EXAM      Date: 9/23/2024  Patient Name: Roxanne Hill  MRN: [id number]  Birthdate 9/20/1923  Date of evaluation: 9/23/2024  Provider: Isidro Dodd MD   Note Started: 4:59 PM EDT 9/23/24    HISTORY OF PRESENT ILLNESS     Chief Complaint   Patient presents with    Fall       History Provided By: Patient and EMS    HPI: Roxanne Hill is a 89 y.o. female presents for evaluation of a mechanical ground-level fall at home prior to arrival.  Patient states she turned and tripped on her rollator.  Patient states she did hit her head, denies LOC but is on blood thinners.  Patient also reports some low back pain which she has at baseline but states it was a little worse after the fall.    PAST MEDICAL HISTORY   Past Medical History:  Past Medical History:   Diagnosis Date    DM (diabetes mellitus) (HCC)     High cholesterol     History of multiple allergies     HTN (hypertension)     Hypertension     Osteoporosis     Type 2 diabetes mellitus (HCC)        Past Surgical History:  Past Surgical History:   Procedure Laterality Date    APPENDECTOMY      HIP ARTHROSCOPY      HYSTERECTOMY (CERVIX STATUS UNKNOWN)      KNEE ARTHROSCOPY      TOE AMPUTATION Left 3/2/2024    LEFT GREAT TOE AMPUTATION performed by Bib Alexander DPM at [facility] MAIN OR    TOTAL HIP ARTHROPLASTY         Family History:  Family History   Problem Relation Age of Onset    Osteoporosis Sister     Hypertension Mother     Diabetes Mother        Social History:  Social History     Tobacco Use    Smoking status: Never    Smokeless tobacco: Never   Vaping Use    Vaping status: Never Used   Substance Use Topics    Alcohol use: Never    Drug use: Never       Allergies:  No Known Allergies    PCP: Fabi Courtney APRN - NP    Current Meds:   No current facility-administered medications for this encounter.     Current Outpatient Medications   Medication Sig Dispense Refill    famotidine (PEPCID) 20

## 2024-09-23 NOTE — DISCHARGE INSTRUCTIONS
Thank you for choosing our Emergency Department for your care.  It is our privilege to care for you in your time of need.  In the next several days, you may receive a survey via email or mailed to your home about your experience with our team.  We would greatly appreciate you taking a few minutes to complete the survey, as we use this information to learn what we have done well and what we could be doing better. Thank you for trusting us with your care!    Below you will find a list of your tests from today's visit.   Labs  No results found for this or any previous visit (from the past 12 hour(s)).    Radiologic Studies  CT Head W/O Contrast   Final Result   Interval greater loss of height and vertebral compression fracture at T12.   Spondylosis and osteopenia as above.         INDICATION: GLF, worsening LBP      COMPARISON: CT 5/31/2024      EXAM: Unenhanced axial CT imaging of the head is obtained with coronal and   sagittal reformations.  CT dose reduction was achieved through use of a   standardized protocol tailored for this examination and automatic exposure   control for dose modulation.       FINDINGS: Ventricles and cortical sulci are unchanged in appearance with   mild-moderate ventricular prominence especially in the atria and temporal horns   of the lateral ventricles appearing unchanged. 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 There is no   unenhanced CT imaging evidence for acute infarction,  intracranial hemorrhage or   mass effect. No extra-axial collection is shown. The partly visualized orbits   and paranasal sinuses appear normal. No osseous abnormality is demonstrated.         IMPRESSION: Stable CT imaging appearance.          INDICATION: GLF, worsening LBP      COMPARISON: CT 5/31/2024      EXAM: Axial CT imaging of the cervical spine with multiplanar reformations. CT   dose reduction was achieved through use of a standardized  lateral ventricles appearing unchanged. 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 There is no   unenhanced CT imaging evidence for acute infarction,  intracranial hemorrhage or   mass effect. No extra-axial collection is shown. The partly visualized orbits   and paranasal sinuses appear normal. No osseous abnormality is demonstrated.         IMPRESSION: Stable CT imaging appearance.          INDICATION: GLF, worsening LBP      COMPARISON: CT 5/31/2024      EXAM: Axial CT imaging of the cervical spine with multiplanar reformations. CT   dose reduction was achieved through use of a standardized protocol tailored for   this examination and automatic exposure control for dose modulation.      FINDINGS: Bones are severely osteopenic. Vertebral body heights remain normally   maintained. Minimal anterolisthesis at C4-5 is again noted. Posterior processes   are intact.      Severe degenerative narrowing of the articulation of the left C1-2 articular   masses is again shown with posterior subluxation. There is moderate to severe   C4-5 and C5-6 degenerative disc space narrowing again shown. Facet   osteoarthrosis is again shown to be severe on the right at C4-5 and on the left   at C3-4, C4-5 and C6-7. No osseous canal stenosis is shown. Substantial right   foraminal stenosis at C4-5 is again noted. No paraspinal soft tissue swelling or   collection is noted      IMPRESSION: Osteopenia and degenerative findings again shown. No acute fracture   or dislocation demonstrated.                           Electronically signed by Paul Burdick MD        ------------------------------------------------------------------------------------------------------------  The evaluation and treatment you received in the Emergency Department were for an urgent problem. It is important that you follow-up with a doctor, nurse practitioner, or physician assistant to:  (1) confirm your

## 2024-10-09 ENCOUNTER — HOSPITAL ENCOUNTER (OUTPATIENT)
Facility: HOSPITAL | Age: 89
Discharge: HOME | End: 2024-10-11
Payer: MEDICARE

## 2024-10-09 DIAGNOSIS — I73.9 PERIPHERAL VASCULAR DISEASE, UNSPECIFIED (HCC): ICD-10-CM

## 2024-10-09 PROCEDURE — 93922 UPR/L XTREMITY ART 2 LEVELS: CPT

## 2024-10-10 LAB
VAS LEFT ABI: 0.92
VAS LEFT ARM BP: 144 MMHG
VAS LEFT DORSALIS PEDIS BP: 133 MMHG
VAS LEFT PTA BP: 30 MMHG
VAS LEFT TBI: 0.61
VAS LEFT TOE PRESSURE: 88 MMHG
VAS RIGHT ABI: 0.83
VAS RIGHT ARM BP: 135 MMHG
VAS RIGHT DORSALIS PEDIS BP: 118 MMHG
VAS RIGHT PTA BP: 120 MMHG
VAS RIGHT TBI: 0.64
VAS RIGHT TOE PRESSURE: 92 MMHG

## 2024-10-10 PROCEDURE — 93922 UPR/L XTREMITY ART 2 LEVELS: CPT | Performed by: SURGERY

## 2024-10-19 PROBLEM — I73.9 PERIPHERAL VASCULAR DISEASE, UNSPECIFIED (HCC): Status: ACTIVE | Noted: 2024-10-19

## 2024-11-20 ENCOUNTER — OFFICE VISIT (OUTPATIENT)
Age: 89
End: 2024-11-20
Payer: MEDICARE

## 2024-11-20 VITALS
HEIGHT: 65 IN | BODY MASS INDEX: 20.16 KG/M2 | SYSTOLIC BLOOD PRESSURE: 126 MMHG | RESPIRATION RATE: 16 BRPM | HEART RATE: 89 BPM | OXYGEN SATURATION: 97 % | WEIGHT: 121 LBS | DIASTOLIC BLOOD PRESSURE: 68 MMHG

## 2024-11-20 DIAGNOSIS — J34.8211: ICD-10-CM

## 2024-11-20 DIAGNOSIS — J31.0 CHRONIC RHINITIS: ICD-10-CM

## 2024-11-20 DIAGNOSIS — H90.3 SENSORINEURAL HEARING LOSS, BILATERAL: Primary | ICD-10-CM

## 2024-11-20 DIAGNOSIS — J34.2 DEVIATED NASAL SEPTUM: ICD-10-CM

## 2024-11-20 PROCEDURE — G8484 FLU IMMUNIZE NO ADMIN: HCPCS | Performed by: OTOLARYNGOLOGY

## 2024-11-20 PROCEDURE — G8420 CALC BMI NORM PARAMETERS: HCPCS | Performed by: OTOLARYNGOLOGY

## 2024-11-20 PROCEDURE — 1123F ACP DISCUSS/DSCN MKR DOCD: CPT | Performed by: OTOLARYNGOLOGY

## 2024-11-20 PROCEDURE — 1036F TOBACCO NON-USER: CPT | Performed by: OTOLARYNGOLOGY

## 2024-11-20 PROCEDURE — G8427 DOCREV CUR MEDS BY ELIG CLIN: HCPCS | Performed by: OTOLARYNGOLOGY

## 2024-11-20 PROCEDURE — 1159F MED LIST DOCD IN RCRD: CPT | Performed by: OTOLARYNGOLOGY

## 2024-11-20 PROCEDURE — 1090F PRES/ABSN URINE INCON ASSESS: CPT | Performed by: OTOLARYNGOLOGY

## 2024-11-20 PROCEDURE — 99213 OFFICE O/P EST LOW 20 MIN: CPT | Performed by: OTOLARYNGOLOGY

## 2024-11-20 RX ORDER — IPRATROPIUM BROMIDE 21 UG/1
1 SPRAY, METERED NASAL EVERY 12 HOURS
Qty: 30 ML | Refills: 3 | Status: SHIPPED | OUTPATIENT
Start: 2024-11-20

## 2024-11-20 NOTE — PROGRESS NOTES
Subjective:      Roxanne Hill    89 y.o.    9/20/1923       Followup visit      Original HPI   Location - nose, ear      Quality - left ear blocked      Severity -  moderate      Duration - few weeks      Timing - ongoing, chronic      Context - f/u pt of [facility] - pt does have baseline right nasal obstruction, mostly from a Mohs recon to right nasal ala region in past; c/o intermittent crusting left side which causes obstruction as well; she does c/o left ear feels blocked lately also      Modifying Features - gel/saline helps some but not enough      Associated symptoms/signs - none      Follow-up HPI    5/24/2021 -6-month follow-up today, patient states symptoms are essentially the same she has some concerns over the outside of her nasal skin she is worried about the crusting on the outside and crusting  on the inside potentially giving her risk for through and through hole in the nose, she continues with using gel daily and still feels the left nasal passage is more dry      2/2/2022 -9-month follow-up today patient says she is overall doing fine.  Using saline gel for the left nasal cavity, does not report any significant bleeding.      8/2/2022 -6-month follow-up -reports no new issues or changes.  She may have had 1 small nosebleed from the left side since last visit but nothing recurrent.  She is now wearing her hearing aids much.      2/7/2023   6-month follow-up.  Reports no new issues.  Continues to wear hearing aids.  Occasional crusting in the left nose but no heavy nosebleeds    8/8/23  6 mos f/u no new issues, no bleeding;  still some left sided nasal congestion.    3/27/2024  6-month follow-up, she has been doing well.  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  No epistaxis, nose has been stable.    11/20/2024  6-month follow-up, patient overall has been stable.  Some increased congestion and she is also complaining of some redness to the nasal tip.      Review

## 2024-12-06 ENCOUNTER — HOSPITAL ENCOUNTER (EMERGENCY)
Facility: HOSPITAL | Age: 88
Discharge: ANOTHER HOSPITAL | End: 2024-12-06
Attending: EMERGENCY MEDICINE
Payer: MEDICARE

## 2024-12-06 ENCOUNTER — APPOINTMENT (OUTPATIENT)
Facility: HOSPITAL | Age: 88
End: 2024-12-06
Payer: MEDICARE

## 2024-12-06 VITALS
DIASTOLIC BLOOD PRESSURE: 81 MMHG | RESPIRATION RATE: 16 BRPM | OXYGEN SATURATION: 98 % | SYSTOLIC BLOOD PRESSURE: 141 MMHG | HEART RATE: 90 BPM | TEMPERATURE: 97.5 F

## 2024-12-06 DIAGNOSIS — S12.121A OTHER CLOSED NONDISPLACED ODONTOID FRACTURE, INITIAL ENCOUNTER (HCC): Primary | ICD-10-CM

## 2024-12-06 LAB
ALBUMIN SERPL-MCNC: 3 G/DL (ref 3.5–5)
ALBUMIN/GLOB SERPL: 0.7 (ref 1.1–2.2)
ALP SERPL-CCNC: 74 U/L (ref 45–117)
ALT SERPL-CCNC: 14 U/L (ref 12–78)
ANION GAP SERPL CALC-SCNC: 5 MMOL/L (ref 2–12)
AST SERPL W P-5'-P-CCNC: 12 U/L (ref 15–37)
BASOPHILS # BLD: 0 K/UL (ref 0–0.1)
BASOPHILS NFR BLD: 0 % (ref 0–1)
BILIRUB SERPL-MCNC: 0.4 MG/DL (ref 0.2–1)
BUN SERPL-MCNC: 17 MG/DL (ref 6–20)
BUN/CREAT SERPL: 19 (ref 12–20)
CA-I BLD-MCNC: 9.4 MG/DL (ref 8.5–10.1)
CHLORIDE SERPL-SCNC: 106 MMOL/L (ref 97–108)
CO2 SERPL-SCNC: 31 MMOL/L (ref 21–32)
CREAT SERPL-MCNC: 0.91 MG/DL (ref 0.55–1.02)
DIFFERENTIAL METHOD BLD: ABNORMAL
EOSINOPHIL # BLD: 0.3 K/UL (ref 0–0.4)
EOSINOPHIL NFR BLD: 3 % (ref 0–7)
ERYTHROCYTE [DISTWIDTH] IN BLOOD BY AUTOMATED COUNT: 16.2 % (ref 11.5–14.5)
GLOBULIN SER CALC-MCNC: 4.2 G/DL (ref 2–4)
GLUCOSE SERPL-MCNC: 204 MG/DL (ref 65–100)
HCT VFR BLD AUTO: 35.7 % (ref 35–47)
HGB BLD-MCNC: 11 G/DL (ref 11.5–16)
IMM GRANULOCYTES # BLD AUTO: 0 K/UL (ref 0–0.04)
IMM GRANULOCYTES NFR BLD AUTO: 0 % (ref 0–0.5)
LYMPHOCYTES # BLD: 2.9 K/UL (ref 0.8–3.5)
LYMPHOCYTES NFR BLD: 27 % (ref 12–49)
MCH RBC QN AUTO: 27.7 PG (ref 26–34)
MCHC RBC AUTO-ENTMCNC: 30.8 G/DL (ref 30–36.5)
MCV RBC AUTO: 89.9 FL (ref 80–99)
MONOCYTES # BLD: 0.7 K/UL (ref 0–1)
MONOCYTES NFR BLD: 6 % (ref 5–13)
NEUTS SEG # BLD: 6.8 K/UL (ref 1.8–8)
NEUTS SEG NFR BLD: 64 % (ref 32–75)
NRBC # BLD: 0 K/UL (ref 0–0.01)
NRBC BLD-RTO: 0 PER 100 WBC
PLATELET # BLD AUTO: 265 K/UL (ref 150–400)
PMV BLD AUTO: 10.7 FL (ref 8.9–12.9)
POTASSIUM SERPL-SCNC: 3 MMOL/L (ref 3.5–5.1)
PROT SERPL-MCNC: 7.2 G/DL (ref 6.4–8.2)
RBC # BLD AUTO: 3.97 M/UL (ref 3.8–5.2)
SODIUM SERPL-SCNC: 142 MMOL/L (ref 136–145)
WBC # BLD AUTO: 10.8 K/UL (ref 3.6–11)

## 2024-12-06 PROCEDURE — 70450 CT HEAD/BRAIN W/O DYE: CPT

## 2024-12-06 PROCEDURE — 80053 COMPREHEN METABOLIC PANEL: CPT

## 2024-12-06 PROCEDURE — 99285 EMERGENCY DEPT VISIT HI MDM: CPT

## 2024-12-06 PROCEDURE — 85025 COMPLETE CBC W/AUTO DIFF WBC: CPT

## 2024-12-06 PROCEDURE — 96374 THER/PROPH/DIAG INJ IV PUSH: CPT

## 2024-12-06 PROCEDURE — 6830039000 HC L3 TRAUMA ALERT

## 2024-12-06 PROCEDURE — 6360000002 HC RX W HCPCS: Performed by: EMERGENCY MEDICINE

## 2024-12-06 PROCEDURE — 72125 CT NECK SPINE W/O DYE: CPT

## 2024-12-06 RX ORDER — MORPHINE SULFATE 4 MG/ML
4 INJECTION, SOLUTION INTRAMUSCULAR; INTRAVENOUS
Status: COMPLETED | OUTPATIENT
Start: 2024-12-06 | End: 2024-12-06

## 2024-12-06 RX ADMIN — MORPHINE SULFATE 4 MG: 4 INJECTION, SOLUTION INTRAMUSCULAR; INTRAVENOUS at 16:28

## 2024-12-06 ASSESSMENT — PAIN SCALES - GENERAL
PAINLEVEL_OUTOF10: 6
PAINLEVEL_OUTOF10: 10

## 2024-12-06 ASSESSMENT — PAIN DESCRIPTION - DESCRIPTORS: DESCRIPTORS: ACHING

## 2024-12-06 ASSESSMENT — PAIN - FUNCTIONAL ASSESSMENT: PAIN_FUNCTIONAL_ASSESSMENT: 0-10

## 2024-12-06 NOTE — ED PROVIDER NOTES
Transfer  INTERPRETATION -  CT Scan and Blood Pressure  CASE REVIEW - Medical Sub-Specialist  TREATMENT RESPONSE -Stable  PERFORMED BY - Self    NOTES:  I have spent 35 minutes of critical care time involved in lab review, consultations with specialist, family decision- making, bedside attention and documentation. Time is exclusive of EKG interpretation, imaging interpretation and separately billed procedures.  During this entire length of time I was immediately available to the patient.  Fang Tovar MD    ED IMPRESSION     1. Other closed nondisplaced odontoid fracture, initial encounter (HCC)          DISPOSITION/PLAN   DISPOSITION Decision To Transfer 12/06/2024 04:45:54 PM   DISPOSITION CONDITION Stable        Transfer: [facility]. The results of their tests and reasons for their transfer have been discussed with the patient and/or available family. The patient/family has conveyed agreement and understanding for the need to be transferred and for their diagnosis. Consultation has been made with Dr Peterson, who agrees to accept the transfer.       I am the Primary Clinician of Record: Fang Tovar MD (electronically signed)    (Please note that parts of this dictation were completed with voice recognition software. Quite often unanticipated grammatical, syntax, homophones, and other interpretive errors are inadvertently transcribed by the computer software. Please disregards these errors. Please excuse any errors that have escaped final proofreading.)     Fang Tovar MD  12/06/24 0153

## 2024-12-06 NOTE — ED NOTES
U  [facility]  11th floor  Room: 0436-A  Dr. Tom Peterson,    Nurse report number: [phone]

## 2024-12-07 NOTE — ED NOTES
Assumed care for this patient. Report received from  Yanet YANEZ. For transfer to [facility], waiting on [facility]. Patient is updated on plan of care, no complaints of pain at this time.

## 2024-12-31 ENCOUNTER — TELEPHONE (OUTPATIENT)
Age: 88
End: 2024-12-31

## 2024-12-31 NOTE — TELEPHONE ENCOUNTER
Hi,    Pt's caregiver, Roslyn, is updating Dr. Mishra on pt's health. Pt fell last month (she cannot recall exact date) and fractured 2 vertebra's in her neck. Caller cancelled pt's upcoming appts in January - will reschedule at later date.     Thank you.

## 2025-04-01 ENCOUNTER — LAB (OUTPATIENT)
Age: 89
End: 2025-04-01

## 2025-04-01 DIAGNOSIS — E55.9 VITAMIN D DEFICIENCY: ICD-10-CM

## 2025-04-01 DIAGNOSIS — M81.0 AGE-RELATED OSTEOPOROSIS WITHOUT CURRENT PATHOLOGICAL FRACTURE: ICD-10-CM

## 2025-04-02 LAB
25(OH)D3 SERPL-MCNC: 46.2 NG/ML (ref 30–100)
ANION GAP SERPL CALC-SCNC: 5 MMOL/L (ref 2–12)
BUN SERPL-MCNC: 27 MG/DL (ref 6–20)
BUN/CREAT SERPL: 29 (ref 12–20)
CALCIUM SERPL-MCNC: 10.1 MG/DL (ref 8.5–10.1)
CHLORIDE SERPL-SCNC: 104 MMOL/L (ref 97–108)
CO2 SERPL-SCNC: 30 MMOL/L (ref 21–32)
CREAT SERPL-MCNC: 0.94 MG/DL (ref 0.55–1.02)
GLUCOSE SERPL-MCNC: 175 MG/DL (ref 65–100)
POTASSIUM SERPL-SCNC: 4.3 MMOL/L (ref 3.5–5.1)
SODIUM SERPL-SCNC: 139 MMOL/L (ref 136–145)

## 2025-04-08 PROBLEM — G45.9 TRANSIENT ISCHEMIC ATTACK: Status: ACTIVE | Noted: 2019-01-18

## 2025-04-08 PROBLEM — K21.00 CHRONIC REFLUX ESOPHAGITIS: Status: ACTIVE | Noted: 2025-04-08

## 2025-04-08 PROBLEM — E55.9 VITAMIN D DEFICIENCY: Status: ACTIVE | Noted: 2025-04-08

## 2025-04-08 PROBLEM — D47.2 IGA MONOCLONAL GAMMOPATHY: Status: ACTIVE | Noted: 2025-04-08

## 2025-04-09 ENCOUNTER — OFFICE VISIT (OUTPATIENT)
Age: 89
End: 2025-04-09
Payer: MEDICARE

## 2025-04-09 VITALS
DIASTOLIC BLOOD PRESSURE: 39 MMHG | HEART RATE: 77 BPM | HEIGHT: 65 IN | TEMPERATURE: 98.7 F | SYSTOLIC BLOOD PRESSURE: 127 MMHG | BODY MASS INDEX: 18.89 KG/M2 | WEIGHT: 113.4 LBS | OXYGEN SATURATION: 96 %

## 2025-04-09 DIAGNOSIS — M81.0 AGE-RELATED OSTEOPOROSIS WITHOUT CURRENT PATHOLOGICAL FRACTURE: Primary | ICD-10-CM

## 2025-04-09 DIAGNOSIS — E55.9 VITAMIN D DEFICIENCY: ICD-10-CM

## 2025-04-09 PROCEDURE — 99214 OFFICE O/P EST MOD 30 MIN: CPT | Performed by: INTERNAL MEDICINE

## 2025-04-09 PROCEDURE — 1159F MED LIST DOCD IN RCRD: CPT | Performed by: INTERNAL MEDICINE

## 2025-04-09 PROCEDURE — G2211 COMPLEX E/M VISIT ADD ON: HCPCS | Performed by: INTERNAL MEDICINE

## 2025-04-09 PROCEDURE — 1036F TOBACCO NON-USER: CPT | Performed by: INTERNAL MEDICINE

## 2025-04-09 PROCEDURE — G8420 CALC BMI NORM PARAMETERS: HCPCS | Performed by: INTERNAL MEDICINE

## 2025-04-09 PROCEDURE — 1160F RVW MEDS BY RX/DR IN RCRD: CPT | Performed by: INTERNAL MEDICINE

## 2025-04-09 PROCEDURE — 1126F AMNT PAIN NOTED NONE PRSNT: CPT | Performed by: INTERNAL MEDICINE

## 2025-04-09 PROCEDURE — G8427 DOCREV CUR MEDS BY ELIG CLIN: HCPCS | Performed by: INTERNAL MEDICINE

## 2025-04-09 PROCEDURE — 1123F ACP DISCUSS/DSCN MKR DOCD: CPT | Performed by: INTERNAL MEDICINE

## 2025-04-09 PROCEDURE — 1090F PRES/ABSN URINE INCON ASSESS: CPT | Performed by: INTERNAL MEDICINE

## 2025-04-09 RX ORDER — METHOCARBAMOL 500 MG/1
500 TABLET, FILM COATED ORAL EVERY 8 HOURS PRN
COMMUNITY
Start: 2024-12-10

## 2025-04-09 RX ORDER — PRAVASTATIN SODIUM 80 MG/1
80 TABLET ORAL DAILY
COMMUNITY

## 2025-04-09 RX ORDER — VALSARTAN AND HYDROCHLOROTHIAZIDE 80; 12.5 MG/1; MG/1
1 TABLET, FILM COATED ORAL DAILY
COMMUNITY

## 2025-04-09 RX ORDER — POTASSIUM CHLORIDE 750 MG/1
10 CAPSULE, EXTENDED RELEASE ORAL DAILY
COMMUNITY
Start: 2025-03-23

## 2025-04-09 RX ORDER — LINAGLIPTIN 5 MG/1
5 TABLET, FILM COATED ORAL DAILY
COMMUNITY

## 2025-04-09 NOTE — PATIENT INSTRUCTIONS
Important     I have ordered medication/test and if you do not hear from the hospital in 7 business days  please call the number below for infusion center    [facility]   [phone]

## 2025-04-09 NOTE — PROGRESS NOTES
Roxanne Hill is a 89 y.o. female here for   Chief Complaint   Patient presents with    Osteoporosis       1. Have you been to the ER, urgent care clinic since your last visit?  Hospitalized since your last visit? -  Multiple Er Visits- [facility] & went to [facility], fell and fractured 2nd vertebrae in neck    2. Have you seen or consulted any other health care providers outside of the [facility] since your last visit?  Include any pap smears or colon screening.- Multiple doctors in epic

## 2025-04-09 NOTE — PROGRESS NOTES
[facility] DIABETES AND ENDOCRINOLOGY                 Suzi Mishra MD        Name : Roxanne Hill      ABRAHAN.O.B : 9/20/1923           Referred by: Muriel Navarro MD       [facility] [phone]         Osteoporosis follow-up      History of Present Illness: Roxanne Hill is here for follow-up of osteoporosis.      She was diagnosed with osteoporosis 10 years ago, was on Prolia since 2014, initially got it from [facility] orthopedics   Tolerated Prolia well.  She has underlying GERD, no peptic ulcer disease.   Left hip fracture after a fall in 2017   Fell in the bathroom, right hip fracture April 2022, had hemiarthroplasty. Reviewed the x-ray, no intertrochanteric fracture    2024: Fall, after a patient's wheelchair ran over, acute small pubic fracture, no displaced fracture  She often forgets to use the walker per friend    2025: Fall after she lost her balance trying to reach an object on the floor, fractured cervical spine    Here with a friend           Physical Examination:      General: pleasant, no distress, good eye contact    HEENT: no exophthalmos, no periorbital edema, EOMI    Neck: No thyromegaly    CVS: S1-S2 regular    RS: Normal respiratory effort    Musculoskeletal: no tremors    Neurological: alert and oriented    Psychiatric: normal mood and affect     Data Reviewed:         Lab Results   Component Value Date    CALCIUM 10.1 04/01/2025    CAION 5.1 06/15/2022    PHOS 3.8 12/21/2021      Lab Results   Component Value Date/Time     04/01/2025 01:21 PM    K 4.3 04/01/2025 01:21 PM     04/01/2025 01:21 PM    CO2 30 04/01/2025 01:21 PM    BUN 27 04/01/2025 01:21 PM    CREATININE 0.94 04/01/2025 01:21 PM    GLUCOSE 175 04/01/2025 01:21 PM    CALCIUM 10.1 04/01/2025 01:21 PM      Magnesium   Date Value Ref Range Status   12/21/2021 2.0 1.6 - 2.4 mg/dL Final     Lab Results   Component Value Date/Time    VITD25 46.2 04/01/2025 01:21 PM       No results found for: \"TSH\",

## 2025-04-23 ENCOUNTER — TELEPHONE (OUTPATIENT)
Age: 89
End: 2025-04-23

## 2025-04-23 ENCOUNTER — HOSPITAL ENCOUNTER (OUTPATIENT)
Facility: HOSPITAL | Age: 89
Setting detail: INFUSION SERIES
Discharge: HOME | End: 2025-04-23
Payer: MEDICARE

## 2025-04-23 VITALS
HEIGHT: 65 IN | HEART RATE: 73 BPM | SYSTOLIC BLOOD PRESSURE: 144 MMHG | DIASTOLIC BLOOD PRESSURE: 63 MMHG | TEMPERATURE: 97.8 F | WEIGHT: 113 LBS | OXYGEN SATURATION: 97 % | BODY MASS INDEX: 18.83 KG/M2 | RESPIRATION RATE: 18 BRPM

## 2025-04-23 PROCEDURE — 96372 THER/PROPH/DIAG INJ SC/IM: CPT

## 2025-04-23 PROCEDURE — 6360000002 HC RX W HCPCS: Performed by: INTERNAL MEDICINE

## 2025-04-23 RX ADMIN — DENOSUMAB 60 MG: 60 INJECTION SUBCUTANEOUS at 13:27

## 2025-04-23 NOTE — PROGRESS NOTES
Office returned call at this time and Jana YANEZ stated labs from patient recent visit sufficient for Prolia injection.

## 2025-04-23 NOTE — TELEPHONE ENCOUNTER
Vikki stated pt was there for Prolia. She said she got the note from Dr. Mishra that pt's numbers were fine to get Prolia, but the order has on there to do a Renal panel, Mag and Phos. They went ahead and gave the Prolia but wanted to make sure that that was fine. Advised them it was.

## 2025-04-23 NOTE — TELEPHONE ENCOUNTER
[facility] nurses lucila called has questions regarding patient labs for prolia injection. Please call

## 2025-05-23 ENCOUNTER — OFFICE VISIT (OUTPATIENT)
Age: 89
End: 2025-05-23
Payer: MEDICARE

## 2025-05-23 VITALS
BODY MASS INDEX: 18.83 KG/M2 | DIASTOLIC BLOOD PRESSURE: 62 MMHG | HEIGHT: 65 IN | SYSTOLIC BLOOD PRESSURE: 130 MMHG | WEIGHT: 113 LBS | OXYGEN SATURATION: 98 % | HEART RATE: 75 BPM

## 2025-05-23 DIAGNOSIS — H60.313 DIFFUSE OTITIS EXTERNA OF BOTH EARS, UNSPECIFIED CHRONICITY: ICD-10-CM

## 2025-05-23 DIAGNOSIS — H61.23 BILATERAL IMPACTED CERUMEN: Primary | ICD-10-CM

## 2025-05-23 PROCEDURE — 1036F TOBACCO NON-USER: CPT

## 2025-05-23 PROCEDURE — G8420 CALC BMI NORM PARAMETERS: HCPCS

## 2025-05-23 PROCEDURE — G8427 DOCREV CUR MEDS BY ELIG CLIN: HCPCS

## 2025-05-23 PROCEDURE — 1090F PRES/ABSN URINE INCON ASSESS: CPT

## 2025-05-23 PROCEDURE — 1123F ACP DISCUSS/DSCN MKR DOCD: CPT

## 2025-05-23 PROCEDURE — 99213 OFFICE O/P EST LOW 20 MIN: CPT

## 2025-05-23 PROCEDURE — 1159F MED LIST DOCD IN RCRD: CPT

## 2025-05-23 PROCEDURE — 4130F TOPICAL PREP RX AOE: CPT

## 2025-05-23 RX ORDER — CIPROFLOXACIN AND DEXAMETHASONE 3; 1 MG/ML; MG/ML
4 SUSPENSION/ DROPS AURICULAR (OTIC) 2 TIMES DAILY
Qty: 7.5 ML | Refills: 0 | Status: SHIPPED | OUTPATIENT
Start: 2025-05-23 | End: 2025-06-02

## 2025-05-23 NOTE — PROGRESS NOTES
[facility] ENT & Allergy          Outpatient Clinic Note    Subjective:   Patient: oRxanne Hill  YOB: 1923  MRN: [id number]  Date of Service:  5/23/2025    Followup Visit    Chief Complaint: Clogged ear sensation bilaterally    History of Present Illness:   Roxanne Hill is a 89 y.o. female who is a f/u pt of [facility] - pt does have baseline right nasal obstruction, mostly from a Mohs recon to right nasal ala region in past; c/o intermittent crusting left side which causes obstruction as well; she does c/o left ear feels blocked lately also   Modifying Features - gel/saline helps some but not enough   Associated symptoms/signs - none      Follow-up HPIs   5/24/2021 -6-month follow-up today, patient states symptoms are essentially the same she has some concerns over the outside of her nasal skin she is worried about the crusting on the outside and crusting  on the inside potentially giving her risk for through and through hole in the nose, she continues with using gel daily and still feels the left nasal passage is more dry      2/2/2022 -9-month follow-up today patient says she is overall doing fine.  Using saline gel for the left nasal cavity, does not report any significant bleeding.      8/2/2022 -6-month follow-up -reports no new issues or changes.  She may have had 1 small nosebleed from the left side since last visit but nothing recurrent.  She is now wearing her hearing aids much.      2/7/2023   6-month follow-up.  Reports no new issues.  Continues to wear hearing aids.  Occasional crusting in the left nose but no heavy nosebleeds     8/8/23  6 mos f/u no new issues, no bleeding;  still some left sided nasal congestion.     3/27/2024  6-month follow-up, she has been doing well.  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  No epistaxis, nose has been stable.     11/20/2024  6-month follow-up, patient overall has been stable.  Some increased congestion and she

## 2025-05-30 ENCOUNTER — OFFICE VISIT (OUTPATIENT)
Age: 89
End: 2025-05-30
Payer: MEDICARE

## 2025-05-30 VITALS
BODY MASS INDEX: 18.83 KG/M2 | WEIGHT: 113 LBS | SYSTOLIC BLOOD PRESSURE: 144 MMHG | DIASTOLIC BLOOD PRESSURE: 62 MMHG | HEIGHT: 65 IN

## 2025-05-30 DIAGNOSIS — H60.313 DIFFUSE OTITIS EXTERNA OF BOTH EARS, UNSPECIFIED CHRONICITY: Primary | ICD-10-CM

## 2025-05-30 PROCEDURE — 1123F ACP DISCUSS/DSCN MKR DOCD: CPT

## 2025-05-30 PROCEDURE — G8428 CUR MEDS NOT DOCUMENT: HCPCS

## 2025-05-30 PROCEDURE — 99212 OFFICE O/P EST SF 10 MIN: CPT

## 2025-05-30 PROCEDURE — 4130F TOPICAL PREP RX AOE: CPT

## 2025-05-30 PROCEDURE — 1090F PRES/ABSN URINE INCON ASSESS: CPT

## 2025-05-30 PROCEDURE — 1036F TOBACCO NON-USER: CPT

## 2025-05-30 PROCEDURE — 1126F AMNT PAIN NOTED NONE PRSNT: CPT

## 2025-05-30 PROCEDURE — G8420 CALC BMI NORM PARAMETERS: HCPCS

## 2025-05-30 RX ORDER — CIPROFLOXACIN AND DEXAMETHASONE 3; 1 MG/ML; MG/ML
4 SUSPENSION/ DROPS AURICULAR (OTIC) 2 TIMES DAILY
Qty: 7.5 ML | Refills: 0 | Status: SHIPPED | OUTPATIENT
Start: 2025-05-30 | End: 2025-06-06

## 2025-05-30 NOTE — PROGRESS NOTES
[facility] ENT & Allergy          Outpatient Clinic Note    Subjective:   Patient: Roxanne Hill  YOB: 1923  MRN: [id number]  Date of Service:  05/30/2025    Followup Visit    Chief Complaint: Clogged ear sensation bilaterally    History of Present Illness:     Initial HPI:  Roxanne Hill is a 89 y.o. female who is a f/u pt of [facility] - pt does have baseline right nasal obstruction, mostly from a Mohs recon to right nasal ala region in past; c/o intermittent crusting left side which causes obstruction as well; she does c/o left ear feels blocked lately also  Modifying Features - gel/saline helps some but not enough  Associated symptoms/signs - none      Follow-up HPIs  5/24/2021 -6-month follow-up today, patient states symptoms are essentially the same she has some concerns over the outside of her nasal skin she is worried about the crusting on the outside and crusting  on the inside potentially giving her risk for through and through hole in the nose, she continues with using gel daily and still feels the left nasal passage is more dry      2/2/2022 -9-month follow-up today patient says she is overall doing fine.  Using saline gel for the left nasal cavity, does not report any significant bleeding.      8/2/2022 -6-month follow-up -reports no new issues or changes.  She may have had 1 small nosebleed from the left side since last visit but nothing recurrent.  She is now wearing her hearing aids much.      2/7/2023   6-month follow-up.  Reports no new issues.  Continues to wear hearing aids.  Occasional crusting in the left nose but no heavy nosebleeds     8/8/23  6 mos f/u no new issues, no bleeding;  still some left sided nasal congestion.     3/27/2024  6-month follow-up, she has been doing well.  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  No epistaxis, nose has been stable.     11/20/2024  6-month follow-up, patient overall has been stable.  Some increased

## 2025-06-12 ENCOUNTER — OFFICE VISIT (OUTPATIENT)
Age: 89
End: 2025-06-12
Payer: MEDICARE

## 2025-06-12 ENCOUNTER — PROCEDURE VISIT (OUTPATIENT)
Age: 89
End: 2025-06-12

## 2025-06-12 VITALS — DIASTOLIC BLOOD PRESSURE: 62 MMHG | SYSTOLIC BLOOD PRESSURE: 128 MMHG | HEART RATE: 78 BPM | OXYGEN SATURATION: 94 %

## 2025-06-12 DIAGNOSIS — H90.3 BILATERAL SENSORINEURAL HEARING LOSS: ICD-10-CM

## 2025-06-12 DIAGNOSIS — H90.3 SENSORINEURAL HEARING LOSS (SNHL), BILATERAL: Primary | ICD-10-CM

## 2025-06-12 DIAGNOSIS — H60.313 DIFFUSE OTITIS EXTERNA OF BOTH EARS, UNSPECIFIED CHRONICITY: Primary | ICD-10-CM

## 2025-06-12 PROCEDURE — 99213 OFFICE O/P EST LOW 20 MIN: CPT

## 2025-06-12 PROCEDURE — 1126F AMNT PAIN NOTED NONE PRSNT: CPT

## 2025-06-12 PROCEDURE — 1036F TOBACCO NON-USER: CPT

## 2025-06-12 PROCEDURE — 1159F MED LIST DOCD IN RCRD: CPT

## 2025-06-12 PROCEDURE — 1123F ACP DISCUSS/DSCN MKR DOCD: CPT

## 2025-06-12 PROCEDURE — G8427 DOCREV CUR MEDS BY ELIG CLIN: HCPCS

## 2025-06-12 PROCEDURE — 1090F PRES/ABSN URINE INCON ASSESS: CPT

## 2025-06-12 PROCEDURE — 4130F TOPICAL PREP RX AOE: CPT

## 2025-06-12 PROCEDURE — G8420 CALC BMI NORM PARAMETERS: HCPCS

## 2025-06-12 NOTE — PROGRESS NOTES
Identified pt with two pt identifiers(name and ). Reviewed record in preparation for visit and have obtained necessary documentation. All patient medications has been reviewed.  Chief Complaint   Patient presents with    Diffuse otitis externa of both ears       Vitals:    25 0940   BP: 138/70   Pulse: 78   SpO2: 94%                   Coordination of Care Questionnaire:   1) Have you been to an emergency room, urgent care, or hospitalized since your last visit?   no       2. Have seen or consulted any other health care provider since your last visit? no        Patient is accompanied by caregiver I have received verbal consent from Roxanne Hill to discuss any/all medical information while they are present in the room.

## 2025-06-12 NOTE — PROGRESS NOTES
Roxanne Hill   9/20/1923, 89 y.o. female   [id number]       Referring Provider: Chance Reynoso PA-C    AUDIOLOGIC EVALUATION      Roxanne Hlil is a 89 y.o. female seen today, 6/12/2025, for an audiologic evaluation. Patient was seen by otolaryngology following today's evaluation.    PERTINENT MEDICAL HISTORY:      Roxanne Hill reports hearing loss of gradual onset over many years. She currently wears hearing aids from [facility]. She has been having difficulty understanding speech in noisy environments and group settings. She notes she hears better now that she had cerumen management completed.    IMPRESSIONS:      Today's results revealed moderate to severe/moderately-severe sensorineural hearing loss. Excellent speech understanding when in quiet. Tympanometry indicates normal movement of the tympanic membrane, consistent with middle ear function.    Follow medical recommendations of primary care physician and referring provider.    ASSESSMENT AND FINDINGS:     Otoscopy:  Dried blood in ear canal from previous cerumen management      RIGHT EAR:  Hearing Sensitivity: Moderate sloping to moderately-severe sensorineural hearing loss  Speech Reception Threshold: 55 dB HL  Word Recognition: Excellent 90%, based on NU-6 by-difficulty list at 85 dB HL using recorded speech stimuli  Tympanometry: Type As, consistent with an undermobile tympanic membrane      LEFT EAR:  Hearing Sensitivity: Moderate sloping to severe sensorineural hearing loss. Unable to reach effective masking levels for bone-conduction  Speech Reception Threshold: 70 dB HL  Word Recognition: Excellent 90%, based on NU-6 by-difficulty list at 95 dB HL using recorded speech stimuli  Tympanometry: Type A, consistent with normal middle ear function      Reliability: Good   Transducer: Inserts    See scanned audiogram dated 6/12/2025 for results.        PATIENT EDUCATION:     The following items were discussed with the patient:  - Test results and

## 2025-06-12 NOTE — PATIENT INSTRUCTIONS
(do not scream)   - Speak slowly and distinctly   - Use short, simple sentences   - Rephrase your words if the person is having a hard time understanding you    - To avoid distortion, don’t speak directly into a person’s ear      Some additional items that may be helpful:   - Remain patient - this is important for both parties   - Write down items that still cannot be heard/understood.  You may write with pen/paper or consider typing/texting on a cell phone or smart device.   - If background noise is unavoidable, try to keep yourself in a good position in the room.  By sitting at a white on the side of the restaurant (preferably a corner), it will be easier to communicate than if you were sitting at a table in the middle with background noise surrounding you.  Keep yourself positioned away from music speakers or heavy foot traffic.

## 2025-06-12 NOTE — PROGRESS NOTES
[facility]          Outpatient Clinic Note    Subjective:   Patient: Roxanne Hill  YOB: 1923  MRN: [id number]  Date of Service:  05/30/2025    Followup Visit    Chief Complaint: clogged ear sensation     History of Present Illness:     Initial HPI:  Roxanne Hill is a 89 y.o. female who is a f/u pt of [facility] - pt does have baseline right nasal obstruction, mostly from a Mohs recon to right nasal ala region in past; c/o intermittent crusting left side which causes obstruction as well; she does c/o left ear feels blocked lately also  Modifying Features - gel/saline helps some but not enough  Associated symptoms/signs - none      Follow-up HPIs  5/24/2021 -6-month follow-up today, patient states symptoms are essentially the same she has some concerns over the outside of her nasal skin she is worried about the crusting on the outside and crusting  on the inside potentially giving her risk for through and through hole in the nose, she continues with using gel daily and still feels the left nasal passage is more dry      2/2/2022 -9-month follow-up today patient says she is overall doing fine.  Using saline gel for the left nasal cavity, does not report any significant bleeding.      8/2/2022 -6-month follow-up -reports no new issues or changes.  She may have had 1 small nosebleed from the left side since last visit but nothing recurrent.  She is now wearing her hearing aids much.      2/7/2023   6-month follow-up.  Reports no new issues.  Continues to wear hearing aids.  Occasional crusting in the left nose but no heavy nosebleeds     8/8/23  6 mos f/u no new issues, no bleeding;  still some left sided nasal congestion.     3/27/2024  6-month follow-up, she has been doing well.  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  No epistaxis, nose has been stable.     11/20/2024  6-month follow-up, patient overall has been stable.  Some increased congestion and

## 2025-07-29 ENCOUNTER — PROCEDURE VISIT (OUTPATIENT)
Age: 89
End: 2025-07-29

## 2025-07-29 ENCOUNTER — OFFICE VISIT (OUTPATIENT)
Age: 89
End: 2025-07-29
Payer: MEDICARE

## 2025-07-29 VITALS
WEIGHT: 113 LBS | HEART RATE: 70 BPM | BODY MASS INDEX: 18.83 KG/M2 | HEIGHT: 65 IN | DIASTOLIC BLOOD PRESSURE: 70 MMHG | OXYGEN SATURATION: 94 % | RESPIRATION RATE: 17 BRPM | SYSTOLIC BLOOD PRESSURE: 120 MMHG

## 2025-07-29 DIAGNOSIS — H90.3 BILATERAL SENSORINEURAL HEARING LOSS: Primary | ICD-10-CM

## 2025-07-29 DIAGNOSIS — R04.0 EPISTAXIS: ICD-10-CM

## 2025-07-29 DIAGNOSIS — H61.22 IMPACTED CERUMEN OF LEFT EAR: ICD-10-CM

## 2025-07-29 DIAGNOSIS — H90.3 SENSORINEURAL HEARING LOSS (SNHL), BILATERAL: Primary | ICD-10-CM

## 2025-07-29 PROCEDURE — 1036F TOBACCO NON-USER: CPT | Performed by: OTOLARYNGOLOGY

## 2025-07-29 PROCEDURE — 1090F PRES/ABSN URINE INCON ASSESS: CPT | Performed by: OTOLARYNGOLOGY

## 2025-07-29 PROCEDURE — 1123F ACP DISCUSS/DSCN MKR DOCD: CPT | Performed by: OTOLARYNGOLOGY

## 2025-07-29 PROCEDURE — 1159F MED LIST DOCD IN RCRD: CPT | Performed by: OTOLARYNGOLOGY

## 2025-07-29 PROCEDURE — G8420 CALC BMI NORM PARAMETERS: HCPCS | Performed by: OTOLARYNGOLOGY

## 2025-07-29 PROCEDURE — G8427 DOCREV CUR MEDS BY ELIG CLIN: HCPCS | Performed by: OTOLARYNGOLOGY

## 2025-07-29 PROCEDURE — 99213 OFFICE O/P EST LOW 20 MIN: CPT | Performed by: OTOLARYNGOLOGY

## 2025-07-29 PROCEDURE — 69210 REMOVE IMPACTED EAR WAX UNI: CPT | Performed by: OTOLARYNGOLOGY

## 2025-07-29 NOTE — PROGRESS NOTES
Roxanne Hill  9/20/1923.89 y.o. female   [id number]      HEARING AID EVALUATION    Roxanne Hill was seen today, 7/29/2025 , for a Hearing Aid Evaluation. Patient was found to have no direct benefit for hearing aids. Patient understands they are responsible for any cost insurance does not cover.    DATE: 7/29/2025       TECHNOLOGY EVALUATION:      After a discussion of evaluation results, discussion of levels of technology and prices, and lifestyle assessment. Roxanne Hill has decided to pursue hearing aids. Color Skin tone P1,  power 2(100), custom molds with canal lock, AU.    Technology to be ordered: OtVivaReal Real 2. The patient believes she has an insurance benefit for these devices. Lamy will be rechecked.  [id number]   [phone]      Patient cost due at time of fitting: $0 of total unclear amount to billed to insurance (Unclear insurance benefit for amplification). TBD if patient will pay at a later date. She is not expecting to.    No charge for today's appointment.      Discussed with patient that any portion of the total cost that is not paid by insurance will be the patient's financial responsibility. Estimated insurance coverage is a verification of benefit and not a guarantee.    PATIENT EDUCATION:      - Verbally reviewed benefits and limitations of devices and available features in hearing aids.   - Verbally discussed realistic expectations of hearing aid use    RECOMMENDATIONS:     - Return when hearing aids arrive for hearing aid fitting      Bakari Sommers, CCC-A  Audiologist

## 2025-07-29 NOTE — PROGRESS NOTES
Subjective:      Roxanne Hill    89 y.o.    9/20/1923       Followup visit      Original HPI   Location - nose, ear      Quality - left ear blocked      Severity -  moderate      Duration - few weeks      Timing - ongoing, chronic      Context - f/u pt of [facility] - pt does have baseline right nasal obstruction, mostly from a Mohs recon to right nasal ala region in past; c/o intermittent crusting left side which causes obstruction as well; she does c/o left ear feels blocked lately also      Modifying Features - gel/saline helps some but not enough      Associated symptoms/signs - none      Follow-up HPI    5/24/2021 -6-month follow-up today, patient states symptoms are essentially the same she has some concerns over the outside of her nasal skin she is worried about the crusting on the outside and crusting  on the inside potentially giving her risk for through and through hole in the nose, she continues with using gel daily and still feels the left nasal passage is more dry      2/2/2022 -9-month follow-up today patient says she is overall doing fine.  Using saline gel for the left nasal cavity, does not report any significant bleeding.      8/2/2022 -6-month follow-up -reports no new issues or changes.  She may have had 1 small nosebleed from the left side since last visit but nothing recurrent.  She is now wearing her hearing aids much.      2/7/2023   6-month follow-up.  Reports no new issues.  Continues to wear hearing aids.  Occasional crusting in the left nose but no heavy nosebleeds    8/8/23  6 mos f/u no new issues, no bleeding;  still some left sided nasal congestion.    3/27/2024  6-month follow-up, she has been doing well.  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  No epistaxis, nose has been stable.    11/20/2024  6-month follow-up, patient overall has been stable.  Some increased congestion and she is also complaining of some redness to the nasal tip.    7/29/25  6

## 2025-08-15 ENCOUNTER — APPOINTMENT (OUTPATIENT)
Facility: HOSPITAL | Age: 89
End: 2025-08-15
Payer: MEDICARE

## 2025-08-15 ENCOUNTER — HOSPITAL ENCOUNTER (EMERGENCY)
Facility: HOSPITAL | Age: 89
Discharge: HOME | End: 2025-08-15
Attending: STUDENT IN AN ORGANIZED HEALTH CARE EDUCATION/TRAINING PROGRAM
Payer: MEDICARE

## 2025-08-15 VITALS
HEART RATE: 71 BPM | BODY MASS INDEX: 19.99 KG/M2 | SYSTOLIC BLOOD PRESSURE: 152 MMHG | DIASTOLIC BLOOD PRESSURE: 64 MMHG | HEIGHT: 65 IN | TEMPERATURE: 98 F | OXYGEN SATURATION: 94 % | WEIGHT: 120 LBS | RESPIRATION RATE: 18 BRPM
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
## 2025-08-15 DIAGNOSIS — S50.01XA CONTUSION OF RIGHT ELBOW, INITIAL ENCOUNTER: ICD-10-CM

## 2025-08-15 DIAGNOSIS — S09.90XA CLOSED HEAD INJURY, INITIAL ENCOUNTER: ICD-10-CM

## 2025-08-15 PROCEDURE — 72125 CT NECK SPINE W/O DYE: CPT

## 2025-08-15 PROCEDURE — 99285 EMERGENCY DEPT VISIT HI MDM: CPT

## 2025-08-15 PROCEDURE — 73070 X-RAY EXAM OF ELBOW: CPT

## 2025-08-15 PROCEDURE — 70450 CT HEAD/BRAIN W/O DYE: CPT

## 2025-08-15 PROCEDURE — 73522 X-RAY EXAM HIPS BI 3-4 VIEWS: CPT

## 2025-08-15 PROCEDURE — 71045 X-RAY EXAM CHEST 1 VIEW: CPT

## 2025-08-15 PROCEDURE — 6830039000 HC L3 TRAUMA ALERT

## 2025-08-15 RX ORDER — ACETAMINOPHEN 325 MG/1
650 TABLET ORAL
Status: DISCONTINUED | OUTPATIENT
Start: 2025-08-15 | End: 2025-08-15 | Stop reason: HOSPADM

## 2025-08-15 ASSESSMENT — PAIN SCALES - GENERAL
PAINLEVEL_OUTOF10: 4
PAINLEVEL_OUTOF10: 6

## 2025-08-15 ASSESSMENT — PAIN - FUNCTIONAL ASSESSMENT
PAIN_FUNCTIONAL_ASSESSMENT: 0-10
PAIN_FUNCTIONAL_ASSESSMENT: 0-10

## 2025-08-15 ASSESSMENT — PAIN DESCRIPTION - LOCATION: LOCATION: HIP

## 2025-09-03 ENCOUNTER — PROCEDURE VISIT (OUTPATIENT)
Age: 89
End: 2025-09-03

## 2025-09-03 DIAGNOSIS — H90.3 SENSORINEURAL HEARING LOSS (SNHL), BILATERAL: Primary | ICD-10-CM

## 2025-09-05 ENCOUNTER — PROCEDURE VISIT (OUTPATIENT)
Age: 89
End: 2025-09-05

## 2025-09-05 DIAGNOSIS — H90.3 SENSORINEURAL HEARING LOSS (SNHL), BILATERAL: Primary | ICD-10-CM

## (undated) DEVICE — PREP SKN CHLRAPRP APL 26ML STR --

## (undated) DEVICE — MAJOR ORTHO PROCEDURE PACK: Brand: MEDLINE INDUSTRIES, INC.

## (undated) DEVICE — GLOVE SURG SZ 8 L12IN FNGR THK79MIL GRN LTX FREE

## (undated) DEVICE — SOUTHSIDE TURNOVER: Brand: MEDLINE INDUSTRIES, INC.

## (undated) DEVICE — SUTURE VCRL SZ 0 L36IN ABSRB VLT L36MM CT-1 1/2 CIR J346H

## (undated) DEVICE — BLADE ELECTRODE: Brand: EDGE

## (undated) DEVICE — SUTURE ETHLN SZ 2-0 L30IN NONABSORBABLE BLK L36MM PSLX 3/8 1697H

## (undated) DEVICE — SOL INJ D5% SOD CL 0.9% 1000ML --

## (undated) DEVICE — HANDPIECE SET WITH HIGH FLOW TIP AND SUCTION TUBE: Brand: INTERPULSE

## (undated) DEVICE — 450 ML BOTTLE OF 0.05% CHLORHEXIDINE GLUCONATE IN 99.95% STERILE WATER FOR IRRIGATION, USP AND APPLICATOR.: Brand: IRRISEPT ANTIMICROBIAL WOUND LAVAGE

## (undated) DEVICE — PICO 7 10CM X 20CM: Brand: PICO™ 7

## (undated) DEVICE — SPONGE GZ 4X4 IN 16-PLY DETECTABLE W/ DMT MSTR TAG

## (undated) DEVICE — CONTAINER SPEC ANAERB VACTNR

## (undated) DEVICE — SUTURE VCRL SZ 2-0 L27IN ABSRB VLT L36MM CT-1 1/2 CIR J339H

## (undated) DEVICE — BOWL BNE CEM MIX SPAT CURET SMARTMIX CTS

## (undated) DEVICE — 2108 SERIES SAGITTAL BLADE FLARED, GROUND  (19.0 X 1.37 X 90.0MM)

## (undated) DEVICE — SWAB CULT LIQ STUART AGR AERB MOD IN BRK SGL RAYON TIP PLAS

## (undated) DEVICE — COVER,TABLE,HEAVY DUTY,79"X110",STRL: Brand: MEDLINE

## (undated) DEVICE — TOWEL SURG W17XL27IN STD BLU COT NONFENESTRATED PREWASHED

## (undated) DEVICE — T5 HOOD WITH PEEL AWAY FACE SHIELD

## (undated) DEVICE — PILLOW ABD HIP UNIV MED FOAM --

## (undated) DEVICE — CEMENT MIXING SYSTEM WITH FEMORAL BREAKWAY NOZZLE: Brand: REVOLUTION

## (undated) DEVICE — PREMIUM WET SKIN PREP TRAY: Brand: MEDLINE INDUSTRIES, INC.

## (undated) DEVICE — KIT BNE CEM PREP FEM QUIK-USE 1 PER CA

## (undated) DEVICE — INTENDED FOR TISSUE SEPARATION, AND OTHER PROCEDURES THAT REQUIRE A SHARP SURGICAL BLADE TO PUNCTURE OR CUT.: Brand: BARD-PARKER ®  SAFETY SCALPED

## (undated) DEVICE — 3M™ STERI-DRAPE™ U-DRAPE 1015: Brand: STERI-DRAPE™

## (undated) DEVICE — DRAPE EQUIP C ARM 74X42 IN MOB XR W/ TIE RUBBER BND LF

## (undated) DEVICE — BASIC SINGLE BASIN-LF: Brand: MEDLINE INDUSTRIES, INC.

## (undated) DEVICE — EXTREMITY-MRMC: Brand: MEDLINE INDUSTRIES, INC.

## (undated) DEVICE — DRAPE,SPLIT ,77X120: Brand: MEDLINE

## (undated) DEVICE — GARMENT,MEDLINE,DVT,INT,CALF,MED, GEN2: Brand: MEDLINE

## (undated) DEVICE — 3M™ STERI-DRAPE™ INCISE DRAPE 1050 (60CM X 45CM): Brand: STERI-DRAPE™

## (undated) DEVICE — DRESSING STERILE PETRO W3XL8IN N ADH OIL EMUL GZ CURAD

## (undated) DEVICE — Device

## (undated) DEVICE — ZIMMER® STERILE DISPOSABLE TOURNIQUET CUFF WITH PROTECTIVE SLEEVE AND PLC, DUAL PORT, SINGLE BLADDER, 18 IN. (46 CM)

## (undated) DEVICE — SOLUTION IRRIG 500ML 0.9% SOD CHLO USP POUR PLAS BTL

## (undated) DEVICE — DRAPE,ORTHOMAX ,HIP,W/POUCHES: Brand: MEDLINE

## (undated) DEVICE — HOOD, PEEL-AWAY: Brand: FLYTE

## (undated) DEVICE — DRAPE,HIP,W/POUCHES,STERILE: Brand: MEDLINE

## (undated) DEVICE — BANDAGE,GAUZE,BULKEE II,4.5"X4.1YD,STRL: Brand: MEDLINE

## (undated) DEVICE — GLOVE ORANGE PI 7   MSG9070